# Patient Record
Sex: MALE | Race: WHITE | NOT HISPANIC OR LATINO | Employment: FULL TIME | URBAN - METROPOLITAN AREA
[De-identification: names, ages, dates, MRNs, and addresses within clinical notes are randomized per-mention and may not be internally consistent; named-entity substitution may affect disease eponyms.]

---

## 2017-01-30 ENCOUNTER — OFFICE VISIT (OUTPATIENT)
Dept: FAMILY MEDICINE | Facility: CLINIC | Age: 57
End: 2017-01-30
Payer: COMMERCIAL

## 2017-01-30 VITALS
HEART RATE: 113 BPM | DIASTOLIC BLOOD PRESSURE: 74 MMHG | TEMPERATURE: 97.5 F | OXYGEN SATURATION: 95 % | WEIGHT: 204.9 LBS | SYSTOLIC BLOOD PRESSURE: 114 MMHG | BODY MASS INDEX: 32.93 KG/M2 | HEIGHT: 66 IN

## 2017-01-30 DIAGNOSIS — J44.9 CHRONIC OBSTRUCTIVE PULMONARY DISEASE, UNSPECIFIED COPD TYPE (H): ICD-10-CM

## 2017-01-30 DIAGNOSIS — I10 BENIGN ESSENTIAL HYPERTENSION: Primary | ICD-10-CM

## 2017-01-30 PROCEDURE — 99213 OFFICE O/P EST LOW 20 MIN: CPT | Performed by: PHYSICIAN ASSISTANT

## 2017-01-30 RX ORDER — LISINOPRIL 10 MG/1
10 TABLET ORAL DAILY
Qty: 90 TABLET | Refills: 1 | Status: SHIPPED | OUTPATIENT
Start: 2017-01-30 | End: 2017-08-01

## 2017-01-30 NOTE — PROGRESS NOTES
SUBJECTIVE:                                                    Otf Bolaños is a 56 year old male who presents to clinic today for the following health issues:    Medication Followup of lisinopril     Taking Medication as prescribed: yes    Side Effects:  None    Medication Helping Symptoms:  yes     -Patient presents for blood pressure check up  -He is taking as prescribed without side effects  -No routinely checking Bps  -Denies any HA, shortness of breath, chest pain or palpitations  -Continues on daily O2, can go without during visits, grocery shopping but notes soon after this he will need to restart    -He did have a small lipo reduction surgery at [facility] in August for his Lipohypertrophy/multiple symmetrical lipomatosis and is being seen again in March    Problem list and histories reviewed & adjusted, as indicated.  Additional history: as documented    Patient Active Problem List   Diagnosis     SOB (shortness of breath)     COPD (chronic obstructive pulmonary disease) (H)     Chronic back pain     Benign essential hypertension     CARDIOVASCULAR SCREENING; LDL GOAL LESS THAN 160     Lipohypertrophy     Multiple symmetrical lipomatosis     Hx of thrombocytopenia     Past Surgical History   Procedure Laterality Date     Tracheostomy  2/7/2014     Procedure: TRACHEOSTOMY;  TRACHEOSTOMY;  Surgeon: Myles Treadwell MD;  Location:  OR       Social History   Substance Use Topics     Smoking status: Former Smoker -- 2.00 packs/day for 30 years     Quit date: 01/14/2014     Smokeless tobacco: Never Used     Alcohol Use: 4.5 oz/week     9 Cans of beer per week      Comment: 3 beers per month     Family History   Problem Relation Age of Onset     Hypertension Father      Myocardial Infarction Father      CANCER Paternal Grandfather            ROS:  Constitutional, HEENT, cardiovascular, pulmonary, gi and gu systems are negative, except as otherwise noted.    OBJECTIVE:                                              "       /74 mmHg  Pulse 113  Temp(Src) 97.5  F (36.4  C) (Oral)  Ht 5' 6.25\" (1.683 m)  Wt 204 lb 14.4 oz (92.942 kg)  BMI 32.81 kg/m2  SpO2 95%  Body mass index is 32.81 kg/(m^2).  GENERAL: healthy, alert and no distress  RESP: lungs clear to auscultation - no rales, rhonchi or wheezes  CV: regular rates and rhythm, normal S1 S2, no S3 or S4 and no murmur, click or rub    Diagnostic Test Results:  none      ASSESSMENT/PLAN:                                                    1. Benign essential hypertension  Well controlled. Refill x 6 months. At that point he will be due for an annual physical for colonoscopy, prostate, and blood work.   - lisinopril (PRINIVIL/ZESTRIL) 10 MG tablet; Take 1 tablet (10 mg) by mouth daily  Dispense: 90 tablet; Refill: 1    2. Chronic obstructive pulmonary disease, unspecified COPD type (H)  Controlled. Continues on home O2    Brian Mckinley PA-C  [facility]    "

## 2017-01-30 NOTE — NURSING NOTE
"Chief Complaint   Patient presents with     Recheck Medication       Initial /74 mmHg  Pulse 113  Temp(Src) 97.5  F (36.4  C) (Oral)  Ht 5' 6.25\" (1.683 m)  Wt 204 lb 14.4 oz (92.942 kg)  BMI 32.81 kg/m2  SpO2 95% Estimated body mass index is 32.81 kg/(m^2) as calculated from the following:    Height as of this encounter: 5' 6.25\" (1.683 m).    Weight as of this encounter: 204 lb 14.4 oz (92.942 kg).  BP completed using cuff size: larry De La Garza CMA        "

## 2017-01-30 NOTE — MR AVS SNAPSHOT
"              After Visit Summary   1/30/2017    Otf Bolaños    MRN: [id number]           Patient Information     Date Of Birth          1960        Visit Information        Provider Department      1/30/2017 9:00 AM Brian Mckinley PA-C [facility]        Today's Diagnoses     Benign essential hypertension    -  1        Follow-ups after your visit        Who to contact     If you have questions or need follow up information about today's clinic visit or your schedule please contact [facility] directly at [phone].  Normal or non-critical lab and imaging results will be communicated to you by Innozhart, letter or phone within 4 business days after the clinic has received the results. If you do not hear from us within 7 days, please contact the clinic through [employer] or phone. If you have a critical or abnormal lab result, we will notify you by phone as soon as possible.  Submit refill requests through [employer] or call your pharmacy and they will forward the refill request to us. Please allow 3 business days for your refill to be completed.          Additional Information About Your Visit        MyChart Information     [employer] gives you secure access to your electronic health record. If you see a primary care provider, you can also send messages to your care team and make appointments. If you have questions, please call your primary care clinic.  If you do not have a primary care provider, please call [phone] and they will assist you.        Care EveryWhere ID     This is your Care EveryWhere ID. This could be used by other organizations to access your [facility] medical records  [id number]        Your Vitals Were     Pulse Temperature Height BMI (Body Mass Index) Pulse Oximetry       113 97.5  F (36.4  C) (Oral) 5' 6.25\" (1.683 m) 32.81 kg/m2 95%        Blood Pressure from Last 3 Encounters:   01/30/17 114/74   07/22/16 130/74   01/07/16 118/60    Weight from " Last 3 Encounters:   01/30/17 204 lb 14.4 oz (92.942 kg)   07/22/16 199 lb 11.2 oz (90.583 kg)   12/17/15 189 lb 9.6 oz (86.002 kg)              Today, you had the following     No orders found for display         Where to get your medicines      [place] [facility] [place] [address]  [address] [facility] [address]     Phone:  [phone]    - lisinopril 10 MG tablet       Primary Care Provider Office Phone # Fax #    Brian Mckinley PA-C [phone]       [facility] [address] [address]  [facility] [address]        Thank you!     Thank you for choosing [facility]  for your care. Our goal is always to provide you with excellent care. Hearing back from our patients is one way we can continue to improve our services. Please take a few minutes to complete the written survey that you may receive in the mail after your visit with us. Thank you!             Your Updated Medication List - Protect others around you: Learn how to safely use, store and throw away your medicines at www.disposemymeds.org.          This list is accurate as of: 1/30/17  9:26 AM.  Always use your most recent med list.                   Brand Name Dispense Instructions for use    aspirin 81 MG tablet      Take 81 mg by mouth daily       lisinopril 10 MG tablet    PRINIVIL/ZESTRIL    90 tablet    Take 1 tablet (10 mg) by mouth daily       naproxen 500 MG tablet    NAPROSYN    30 tablet    Take 1 tablet (500 mg) by mouth daily       sildenafil 100 MG cap/tab    REVATIO/VIAGRA    12 tablet    Take 0.5-1 tablets ( mg) by mouth daily as needed for erectile dysfunction Take 30 min to 4 hours before intercourse.

## 2017-01-31 DIAGNOSIS — N52.9 ERECTILE DYSFUNCTION, UNSPECIFIED ERECTILE DYSFUNCTION TYPE: Primary | ICD-10-CM

## 2017-01-31 RX ORDER — SILDENAFIL 100 MG/1
50-100 TABLET, FILM COATED ORAL DAILY PRN
Qty: 12 TABLET | Refills: 11 | Status: SHIPPED | OUTPATIENT
Start: 2017-01-31 | End: 2018-02-05

## 2017-01-31 ASSESSMENT — ANXIETY QUESTIONNAIRES
IF YOU CHECKED OFF ANY PROBLEMS ON THIS QUESTIONNAIRE, HOW DIFFICULT HAVE THESE PROBLEMS MADE IT FOR YOU TO DO YOUR WORK, TAKE CARE OF THINGS AT HOME, OR GET ALONG WITH OTHER PEOPLE: NOT DIFFICULT AT ALL
7. FEELING AFRAID AS IF SOMETHING AWFUL MIGHT HAPPEN: NOT AT ALL
3. WORRYING TOO MUCH ABOUT DIFFERENT THINGS: MORE THAN HALF THE DAYS
GAD7 TOTAL SCORE: 2
2. NOT BEING ABLE TO STOP OR CONTROL WORRYING: NOT AT ALL
6. BECOMING EASILY ANNOYED OR IRRITABLE: NOT AT ALL
5. BEING SO RESTLESS THAT IT IS HARD TO SIT STILL: NOT AT ALL
1. FEELING NERVOUS, ANXIOUS, OR ON EDGE: NOT AT ALL

## 2017-01-31 ASSESSMENT — PATIENT HEALTH QUESTIONNAIRE - PHQ9: 5. POOR APPETITE OR OVEREATING: NOT AT ALL

## 2017-01-31 NOTE — TELEPHONE ENCOUNTER
Viagra      Last Written Prescription Date: 09/14/2015  Last Fill Quantity: 12,  # refills: 11   Last Office Visit with [facility], [facility] or [facility] prescribing provider: 01/30/2017                                               Thank you!  Flores De Dios  [facility]  On behalf of [facility]

## 2017-02-01 ASSESSMENT — ANXIETY QUESTIONNAIRES: GAD7 TOTAL SCORE: 2

## 2017-02-01 ASSESSMENT — PATIENT HEALTH QUESTIONNAIRE - PHQ9: SUM OF ALL RESPONSES TO PHQ QUESTIONS 1-9: 4

## 2017-02-23 ENCOUNTER — TELEPHONE (OUTPATIENT)
Dept: FAMILY MEDICINE | Facility: CLINIC | Age: 57
End: 2017-02-23

## 2017-02-23 NOTE — LETTER
February 23, 2017      Otf Bolaños  [address] [place] [address]        Dear Mr. Otf Bolaños,    Our records show that you are currently due for colon cancer screening either with a colonoscopy or a stool test (FIT Test).   Please call our clinic at [phone] to have these orders placed and we can assist you in scheduling this appointment.    If you have already had this completed please contact our clinic so we can update your chart.     If you have further questions or concerns please feel free to contact us via [employer] or by calling our clinic at [phone].    Sincerely,     Brian Mckinley PA-C

## 2017-02-23 NOTE — TELEPHONE ENCOUNTER
Panel Management Review      Patient has the following on his problem list: None      Composite cancer screening  Chart review shows that this patient is due/due soon for the following Colonoscopy  Summary:    Patient is due/failing the following:   COLONOSCOPY    Action needed:   Patient needs office visit for Colonoscopy.    Type of outreach:    Sent [employer] message.    Questions for provider review:    None                                                                                                                                    Aaron De La Garza [facility]       Chart routed to Care Team .

## 2017-03-30 ENCOUNTER — TELEPHONE (OUTPATIENT)
Dept: FAMILY MEDICINE | Facility: CLINIC | Age: 57
End: 2017-03-30

## 2017-03-30 NOTE — TELEPHONE ENCOUNTER
Panel Management Review      Patient has the following on his problem list: None      Composite cancer screening  Chart review shows that this patient is due/due soon for the following Colonoscopy  Summary:    Patient is due/failing the following:   COLONOSCOPY    Action needed:   Patient needs office visit for Colonoscopy.    Type of outreach:    Sent [employer] message.    Questions for provider review:    None                                                                                                                                    Aaron De La Garza [facility]       Chart routed to Care Team .

## 2017-06-19 ENCOUNTER — MEDICAL CORRESPONDENCE (OUTPATIENT)
Dept: HEALTH INFORMATION MANAGEMENT | Facility: CLINIC | Age: 57
End: 2017-06-19

## 2017-08-01 ENCOUNTER — OFFICE VISIT (OUTPATIENT)
Dept: FAMILY MEDICINE | Facility: CLINIC | Age: 57
End: 2017-08-01
Payer: COMMERCIAL

## 2017-08-01 VITALS
DIASTOLIC BLOOD PRESSURE: 72 MMHG | HEIGHT: 66 IN | TEMPERATURE: 97.6 F | OXYGEN SATURATION: 94 % | WEIGHT: 208.7 LBS | BODY MASS INDEX: 33.54 KG/M2 | SYSTOLIC BLOOD PRESSURE: 114 MMHG | HEART RATE: 104 BPM

## 2017-08-01 DIAGNOSIS — Z13.6 CARDIOVASCULAR SCREENING; LDL GOAL LESS THAN 160: ICD-10-CM

## 2017-08-01 DIAGNOSIS — L29.9 ITCHY SCALP: ICD-10-CM

## 2017-08-01 DIAGNOSIS — E88.2: ICD-10-CM

## 2017-08-01 DIAGNOSIS — J44.9 CHRONIC OBSTRUCTIVE PULMONARY DISEASE, UNSPECIFIED COPD TYPE (H): ICD-10-CM

## 2017-08-01 DIAGNOSIS — I10 BENIGN ESSENTIAL HYPERTENSION: Primary | ICD-10-CM

## 2017-08-01 DIAGNOSIS — Z11.59 NEED FOR HEPATITIS C SCREENING TEST: ICD-10-CM

## 2017-08-01 PROCEDURE — 86803 HEPATITIS C AB TEST: CPT | Performed by: PHYSICIAN ASSISTANT

## 2017-08-01 PROCEDURE — 80061 LIPID PANEL: CPT | Performed by: PHYSICIAN ASSISTANT

## 2017-08-01 PROCEDURE — 36415 COLL VENOUS BLD VENIPUNCTURE: CPT | Performed by: PHYSICIAN ASSISTANT

## 2017-08-01 PROCEDURE — 99214 OFFICE O/P EST MOD 30 MIN: CPT | Performed by: PHYSICIAN ASSISTANT

## 2017-08-01 PROCEDURE — 80048 BASIC METABOLIC PNL TOTAL CA: CPT | Performed by: PHYSICIAN ASSISTANT

## 2017-08-01 RX ORDER — KETOCONAZOLE 20 MG/ML
SHAMPOO TOPICAL DAILY PRN
Qty: 100 ML | Refills: 0 | Status: SHIPPED | OUTPATIENT
Start: 2017-08-01 | End: 2018-02-05

## 2017-08-01 RX ORDER — LISINOPRIL 10 MG/1
10 TABLET ORAL DAILY
Qty: 90 TABLET | Refills: 1 | Status: SHIPPED | OUTPATIENT
Start: 2017-08-01 | End: 2018-02-05

## 2017-08-01 NOTE — NURSING NOTE
"Chief Complaint   Patient presents with     Recheck Medication     Itchy head       Initial /72 (BP Location: Right arm, Cuff Size: Adult Regular)  Pulse 104  Temp 97.6  F (36.4  C) (Oral)  Ht 5' 6.25\" (1.683 m)  Wt 208 lb 11.2 oz (94.7 kg)  SpO2 94%  BMI 33.43 kg/m2 Estimated body mass index is 33.43 kg/(m^2) as calculated from the following:    Height as of this encounter: 5' 6.25\" (1.683 m).    Weight as of this encounter: 208 lb 11.2 oz (94.7 kg).  Medication Reconciliation: complete   Aaron De La Garza CMA        "

## 2017-08-01 NOTE — MR AVS SNAPSHOT
After Visit Summary   8/1/2017    Otf Bolaños    MRN: [id number]           Patient Information     Date Of Birth          1960        Visit Information        Provider Department      8/1/2017 9:00 AM Brian Mckinley PA-C [facility]        Today's Diagnoses     Benign essential hypertension    -  1    Chronic obstructive pulmonary disease, unspecified COPD type (H)        Multiple symmetrical lipomatosis        Itchy scalp        CARDIOVASCULAR SCREENING; LDL GOAL LESS THAN 160        Need for hepatitis C screening test           Follow-ups after your visit        Who to contact     If you have questions or need follow up information about today's clinic visit or your schedule please contact [facility] directly at [phone].  Normal or non-critical lab and imaging results will be communicated to you by [employer]iCurehart, letter or phone within 4 business days after the clinic has received the results. If you do not hear from us within 7 days, please contact the clinic through [employer] or phone. If you have a critical or abnormal lab result, we will notify you by phone as soon as possible.  Submit refill requests through [employer] or call your pharmacy and they will forward the refill request to us. Please allow 3 business days for your refill to be completed.          Additional Information About Your Visit        MyChart Information     [employer] gives you secure access to your electronic health record. If you see a primary care provider, you can also send messages to your care team and make appointments. If you have questions, please call your primary care clinic.  If you do not have a primary care provider, please call [phone] and they will assist you.        Care EveryWhere ID     This is your Care EveryWhere ID. This could be used by other organizations to access your [facility] medical records  [id number]        Your Vitals Were     Pulse Temperature  "Height Pulse Oximetry BMI (Body Mass Index)       104 97.6  F (36.4  C) (Oral) 5' 6.25\" (1.683 m) 94% 33.43 kg/m2        Blood Pressure from Last 3 Encounters:   08/01/17 114/72   01/30/17 114/74   07/22/16 130/74    Weight from Last 3 Encounters:   08/01/17 208 lb 11.2 oz (94.7 kg)   01/30/17 204 lb 14.4 oz (92.9 kg)   07/22/16 199 lb 11.2 oz (90.6 kg)              We Performed the Following     Basic metabolic panel     Hepatitis C Screen Reflex to HCV RNA Quant and Genotype     Lipid panel reflex to direct LDL          Today's Medication Changes          These changes are accurate as of: 8/1/17  9:28 AM.  If you have any questions, ask your nurse or doctor.               Start taking these medicines.        Dose/Directions    fluticasone-vilanterol 200-25 MCG/INH oral inhaler   Commonly known as:  BREO ELLIPTA   Used for:  Chronic obstructive pulmonary disease, unspecified COPD type (H)   Started by:  Brian Mckinley PA-C        Dose:  1 puff   Inhale 1 puff into the lungs daily   Quantity:  1 Inhaler   Refills:  5       ketoconazole 2 % shampoo   Commonly known as:  NIZORAL   Used for:  Itchy scalp   Started by:  Brian Mckinley PA-C        Apply topically daily as needed for itching or irritation   Quantity:  100 mL   Refills:  0            Where to get your medicines      These medications were sent to [facility]  [place] - [address]  [address] [address]     Phone:  [phone]     fluticasone-vilanterol 200-25 MCG/INH oral inhaler    ketoconazole 2 % shampoo    lisinopril 10 MG tablet                Primary Care Provider Office Phone # Fax #    Brian Mckinley PA-C [phone]       [facility] [place] [address] [place] [address]        Equal Access to Services     FIDEL PRIDE AH: Hadii aad ku hadasho Soomaali, waaxda luqadaha, qaybta kaalmada adeegyada, waxay valariein hayaan adeeg kharash la'aan ah. So [facility] " [phone].    ATENCIÓN: Si anderson michele, tiene a guillen disposición servicios gratuitos de asistencia lingüística. Francoise lopez [phone].    We comply with applicable federal civil rights laws and [place] laws. We do not discriminate on the basis of race, color, national origin, age, disability sex, sexual orientation or gender identity.            Thank you!     Thank you for choosing [facility] [place]  for your care. Our goal is always to provide you with excellent care. Hearing back from our patients is one way we can continue to improve our services. Please take a few minutes to complete the written survey that you may receive in the mail after your visit with us. Thank you!             Your Updated Medication List - Protect others around you: Learn how to safely use, store and throw away your medicines at www.disposemymeds.org.          This list is accurate as of: 8/1/17  9:28 AM.  Always use your most recent med list.                   Brand Name Dispense Instructions for use Diagnosis    aspirin 81 MG tablet      Take 81 mg by mouth daily        fluticasone-vilanterol 200-25 MCG/INH oral inhaler    BREO ELLIPTA    1 Inhaler    Inhale 1 puff into the lungs daily    Chronic obstructive pulmonary disease, unspecified COPD type (H)       ketoconazole 2 % shampoo    NIZORAL    100 mL    Apply topically daily as needed for itching or irritation    Itchy scalp       lisinopril 10 MG tablet    PRINIVIL/ZESTRIL    90 tablet    Take 1 tablet (10 mg) by mouth daily    Benign essential hypertension       sildenafil 100 MG cap/tab    REVATIO/VIAGRA    12 tablet    Take 0.5-1 tablets ( mg) by mouth daily as needed for erectile dysfunction Take 30 min to 4 hours before intercourse.    Erectile dysfunction, unspecified erectile dysfunction type
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
## 2017-08-02 LAB
ANION GAP SERPL CALCULATED.3IONS-SCNC: 4 MMOL/L (ref 3–14)
BUN SERPL-MCNC: 15 MG/DL (ref 7–30)
CALCIUM SERPL-MCNC: 9.1 MG/DL (ref 8.5–10.1)
CHLORIDE SERPL-SCNC: 106 MMOL/L (ref 94–109)
CHOLEST SERPL-MCNC: 201 MG/DL
CO2 SERPL-SCNC: 28 MMOL/L (ref 20–32)
CREAT SERPL-MCNC: 0.91 MG/DL (ref 0.66–1.25)
GFR SERPL CREATININE-BSD FRML MDRD: 86 ML/MIN/1.7M2
GLUCOSE SERPL-MCNC: 114 MG/DL (ref 70–99)
HCV AB SERPL QL IA: NORMAL
HDLC SERPL-MCNC: 53 MG/DL
LDLC SERPL CALC-MCNC: 129 MG/DL
NONHDLC SERPL-MCNC: 148 MG/DL
POTASSIUM SERPL-SCNC: 4.8 MMOL/L (ref 3.4–5.3)
SODIUM SERPL-SCNC: 138 MMOL/L (ref 133–144)
TRIGL SERPL-MCNC: 94 MG/DL

## 2017-09-25 ENCOUNTER — DOCUMENTATION ONLY (OUTPATIENT)
Dept: FAMILY MEDICINE | Facility: CLINIC | Age: 57
End: 2017-09-25

## 2017-09-25 NOTE — PROGRESS NOTES
Panel Management Review      Patient has the following on his problem list: None      Composite cancer screening  Chart review shows that this patient is due/due soon for the following Colonoscopy  Summary:    Patient is due/failing the following:   COLONOSCOPY    Action needed:   Patient needs office visit for colon  .    Type of outreach:    Sent [employer] message.    Questions for provider review:    None                                                                                                                                    Aaron De La Garza [facility]       Chart routed to Care Team .

## 2018-02-05 ENCOUNTER — OFFICE VISIT (OUTPATIENT)
Dept: FAMILY MEDICINE | Facility: CLINIC | Age: 58
End: 2018-02-05
Payer: COMMERCIAL

## 2018-02-05 VITALS
OXYGEN SATURATION: 94 % | TEMPERATURE: 98.2 F | WEIGHT: 207.5 LBS | BODY MASS INDEX: 33.35 KG/M2 | HEIGHT: 66 IN | HEART RATE: 105 BPM | DIASTOLIC BLOOD PRESSURE: 77 MMHG | RESPIRATION RATE: 16 BRPM | SYSTOLIC BLOOD PRESSURE: 122 MMHG

## 2018-02-05 DIAGNOSIS — Z00.01 ENCOUNTER FOR ROUTINE ADULT HEALTH EXAMINATION WITH ABNORMAL FINDINGS: Primary | ICD-10-CM

## 2018-02-05 DIAGNOSIS — I27.20 PULMONARY HYPERTENSION (H): ICD-10-CM

## 2018-02-05 DIAGNOSIS — R73.03 PRE-DIABETES: ICD-10-CM

## 2018-02-05 DIAGNOSIS — N52.9 ERECTILE DYSFUNCTION, UNSPECIFIED ERECTILE DYSFUNCTION TYPE: ICD-10-CM

## 2018-02-05 DIAGNOSIS — Z12.5 SCREENING FOR PROSTATE CANCER: ICD-10-CM

## 2018-02-05 DIAGNOSIS — E88.2: ICD-10-CM

## 2018-02-05 DIAGNOSIS — I10 BENIGN ESSENTIAL HYPERTENSION: ICD-10-CM

## 2018-02-05 DIAGNOSIS — J44.9 CHRONIC OBSTRUCTIVE PULMONARY DISEASE, UNSPECIFIED COPD TYPE (H): ICD-10-CM

## 2018-02-05 DIAGNOSIS — E78.5 HYPERLIPIDEMIA LDL GOAL <100: ICD-10-CM

## 2018-02-05 PROCEDURE — 99396 PREV VISIT EST AGE 40-64: CPT | Performed by: PHYSICIAN ASSISTANT

## 2018-02-05 PROCEDURE — 80048 BASIC METABOLIC PNL TOTAL CA: CPT | Performed by: PHYSICIAN ASSISTANT

## 2018-02-05 PROCEDURE — 36415 COLL VENOUS BLD VENIPUNCTURE: CPT | Performed by: PHYSICIAN ASSISTANT

## 2018-02-05 PROCEDURE — 80061 LIPID PANEL: CPT | Performed by: PHYSICIAN ASSISTANT

## 2018-02-05 PROCEDURE — G0103 PSA SCREENING: HCPCS | Performed by: PHYSICIAN ASSISTANT

## 2018-02-05 RX ORDER — LISINOPRIL 10 MG/1
10 TABLET ORAL DAILY
Qty: 90 TABLET | Refills: 1 | Status: SHIPPED | OUTPATIENT
Start: 2018-02-05 | End: 2018-07-30

## 2018-02-05 ASSESSMENT — PATIENT HEALTH QUESTIONNAIRE - PHQ9
SUM OF ALL RESPONSES TO PHQ QUESTIONS 1-9: 9
5. POOR APPETITE OR OVEREATING: NOT AT ALL
SUM OF ALL RESPONSES TO PHQ QUESTIONS 1-9: 9
10. IF YOU CHECKED OFF ANY PROBLEMS, HOW DIFFICULT HAVE THESE PROBLEMS MADE IT FOR YOU TO DO YOUR WORK, TAKE CARE OF THINGS AT HOME, OR GET ALONG WITH OTHER PEOPLE: NOT DIFFICULT AT ALL

## 2018-02-05 ASSESSMENT — ANXIETY QUESTIONNAIRES
7. FEELING AFRAID AS IF SOMETHING AWFUL MIGHT HAPPEN: NOT AT ALL
IF YOU CHECKED OFF ANY PROBLEMS ON THIS QUESTIONNAIRE, HOW DIFFICULT HAVE THESE PROBLEMS MADE IT FOR YOU TO DO YOUR WORK, TAKE CARE OF THINGS AT HOME, OR GET ALONG WITH OTHER PEOPLE: NOT DIFFICULT AT ALL
1. FEELING NERVOUS, ANXIOUS, OR ON EDGE: SEVERAL DAYS
6. BECOMING EASILY ANNOYED OR IRRITABLE: NOT AT ALL
3. WORRYING TOO MUCH ABOUT DIFFERENT THINGS: SEVERAL DAYS
5. BEING SO RESTLESS THAT IT IS HARD TO SIT STILL: NOT AT ALL
GAD7 TOTAL SCORE: 3
2. NOT BEING ABLE TO STOP OR CONTROL WORRYING: SEVERAL DAYS

## 2018-02-05 NOTE — PROGRESS NOTES
SUBJECTIVE:   CC: Otf Bolaños is an 57 year old male who presents for preventative health visit.     Physical   Annual:     Getting at least 3 servings of Calcium per day::  NO    Bi-annual eye exam::  NO    Dental care twice a year::  NO    Sleep apnea or symptoms of sleep apnea::  None    Diet::  Breakfast skipped    Frequency of exercise::  4-5 days/week    Duration of exercise::  Less than 15 minutes    Taking medications regularly::  Yes    Medication side effects::  None    Additional concerns today::  No          -Patient presents for annual physical  -Since last seen he has had some surgery to remove some of his excess tissue along the neck   -he did have a stint in the ICU during this time   -told he may have pulmonary hypertension   -hx of COPD, uses oxygen at times, not routinely any longer   -continues O2 overnight   -did not  inhaler to start since when last seen   -I have not seen these [place] results   -would  Like to see pulmonology before considering other tx, Dr. Richard        Today's PHQ-2 Score:   PHQ-2 ( 1999 [employer]) 2/5/2018   Q1: Little interest or pleasure in doing things 2   Q2: Feeling down, depressed or hopeless 2   PHQ-2 Score 4   Q1: Little interest or pleasure in doing things More than half the days   Q2: Feeling down, depressed or hopeless More than half the days   PHQ-2 Score 4       Abuse: Current or Past(Physical, Sexual or Emotional)- No  Do you feel safe in your environment - Yes    Social History   Substance Use Topics     Smoking status: Former Smoker     Packs/day: 2.00     Years: 30.00     Quit date: 1/14/2014     Smokeless tobacco: Never Used     Alcohol use 4.5 oz/week     9 Cans of beer per week      Comment: 3 beers per month     Alcohol Use 2/5/2018   If you drink alcohol, do you typically have greater than 3 drinks per day OR greater than 7 drinks per week?   No       Last PSA: No results found for: PSA    Reviewed orders with patient. Reviewed health  maintenance and updated orders accordingly - Yes  Labs reviewed in EPIC    Reviewed and updated as needed this visit by clinical staff         Reviewed and updated as needed this visit by Provider            Review of Systems  C: NEGATIVE for fever, chills, change in weight  I: NEGATIVE for worrisome rashes, moles or lesions  E: NEGATIVE for vision changes or irritation  ENT: NEGATIVE for ear, mouth and throat problems  R: NEGATIVE for significant cough or SOB  CV: NEGATIVE for chest pain, palpitations or peripheral edema  GI: NEGATIVE for nausea, abdominal pain, heartburn, or change in bowel habits   male: negative for dysuria, hematuria, decreased urinary stream, erectile dysfunction, urethral discharge  M: NEGATIVE for significant arthralgias or myalgia  N: NEGATIVE for weakness, dizziness or paresthesias  P: NEGATIVE for changes in mood or affect    OBJECTIVE:   There were no vitals taken for this visit.    Physical Exam  GENERAL: healthy, alert and no distress  EYES: Eyes grossly normal to inspection, PERRL and conjunctivae and sclerae normal  HENT: ear canals and TM's normal, nose and mouth without ulcers or lesions  HENT: normal cephalic/atraumatic with increased soft, fatty tissue along right occipital, ear canals and TM's normal, nose and mouth without ulcers or lesions, oropharynx clear and oral mucous membranes moist  NECK: longituinal anterior scar from tissue removal  RESP: lungs clear to auscultation, quiet, diminished expiratory phase   CV: regular rates and rhythm, normal S1 S2, no S3 or S4 and no murmur, click or rub  ABDOMEN: soft, nontender, no hepatosplenomegaly, no masses and bowel sounds normal   (male): normal male genitalia without lesions or urethral discharge, no hernia  MS: no gross musculoskeletal defects noted, no edema  NEURO: Normal strength and tone, mentation intact and speech normal    ASSESSMENT/PLAN:   1. Encounter for routine adult health examination with abnormal  "findings  Up to date on immunizations. Declines colonoscopy. Updating PSA    2. Benign essential hypertension  Controlled. Refilling  - Basic metabolic panel  (Ca, Cl, CO2, Creat, Gluc, K, Na, BUN)  - lisinopril (PRINIVIL/ZESTRIL) 10 MG tablet; Take 1 tablet (10 mg) by mouth daily  Dispense: 90 tablet; Refill: 1    3. Chronic obstructive pulmonary disease, unspecified COPD type (H)  Continues on evening O2, did not  inhaler for use. New dx of pulmonary htn, though I have not seen records. His family would like him to discuss work up with pulm. Certainly he would benefit from daily inhalation tx and rehab?  - PULMONARY MEDICINE REFERRAL    4. Hyperlipidemia LDL goal <100  Rechecking. He needs work on diet and exercise. Will need to consider statin  - Lipid panel reflex to direct LDL Fasting    5. Multiple symmetrical lipomatosis  Recent surgery for lipomatous removal. Follow up later this spring    6. Pulmonary hypertension  As above. I have not seen the [facility] records recording this but he will be sending them  - PULMONARY MEDICINE REFERRAL    7. Pre-diabetes  Rechecking. Reviewed diet and exercise  - Basic metabolic panel  (Ca, Cl, CO2, Creat, Gluc, K, Na, BUN)    8. Screening for prostate cancer  - Prostate spec antigen screen    COUNSELING:   Reviewed preventive health counseling, as reflected in patient instructions       Regular exercise       Healthy diet/nutrition       Colon cancer screening       Prostate cancer screening         reports that he quit smoking about 4 years ago. He has a 60.00 pack-year smoking history. He has never used smokeless tobacco.    Estimated body mass index is 33.43 kg/(m^2) as calculated from the following:    Height as of 8/1/17: 5' 6.25\" (1.683 m).    Weight as of 8/1/17: 208 lb 11.2 oz (94.7 kg).   Weight management plan: Discussed healthy diet and exercise guidelines and patient will follow up in 12 months in clinic to re-evaluate.    Counseling Resources:  ATP IV " Guidelines  Pooled Cohorts Equation Calculator  FRAX Risk Assessment  ICSI Preventive Guidelines  Dietary Guidelines for Americans, 2010  USDA's MyPlate  ASA Prophylaxis  Lung CA Screening    Brian Mckilney PA-C  [facility]  Answers for HPI/ROS submitted by the patient on 2/5/2018   PHQ-2 Score: 4  If you checked off any problems, how difficult have these problems made it for you to do your work, take care of things at home, or get along with other people?: Not difficult at all  PHQ9 TOTAL SCORE: 9

## 2018-02-05 NOTE — LETTER
February 9, 2018      Otf Bolaños  [address] [place] [address]        Betzy Carter seeing you last week as always.    Copies of the labs from your visit included. Overall they look very good.    The screening of your prostate was well within the levels we like to see.    Your cholesterol panel got much better than 6 months ago! I cannot recall if you'd made any major dietary changes but whatever you're doing keep it up!    Finally, screening of the kidneys, electrolytes and for diabetes looked good as well.     Please let me know if you have any questions about any of these,    Marquis Mckinley

## 2018-02-05 NOTE — TELEPHONE ENCOUNTER
"Requested Prescriptions   Pending Prescriptions Disp Refills     VIAGRA 100 MG tablet [Pharmacy Med Name: VIAGRA 100MG TABS]  Last Written Prescription Date:  1/31/17  Last Fill Quantity: 12,  # refills: 11   Last Office Visit with [facility] provider:  2/5/2018     Future Office Visit:      12 tablet 11     Sig: TAKE ONE-HALF TO ONE TABLET BY MOUTH ONCE DAILY AS NEEDED FOR ERECTILE DYSFUNCTION. TAKE 30 MINUTES TO 4 HOURS BEFORE INTERCOURSE.    Erectile Dysfuction Protocol Failed    2/5/2018  9:54 AM       Failed - Absence of nitrates on medication list       Passed - Absence of Alpha Blockers on Med list       Passed - Recent or future visit with authorizing provider    Patient had office visit in the last year or has a visit in the next 30 days with authorizing provider.  See \"Patient Info\" tab in inbasket, or \"Choose Columns\" in Meds & Orders section of the refill encounter.            Passed - Patient is age 18 or older          "

## 2018-02-05 NOTE — MR AVS SNAPSHOT
After Visit Summary   2/5/2018    Otf Bolaños    MRN: [id number]           Patient Information     Date Of Birth          1960        Visit Information        Provider Department      2/5/2018 9:00 AM Brian Mckinley PA-C [facility]        Today's Diagnoses     Encounter for routine adult health examination with abnormal findings    -  1    Benign essential hypertension        Chronic obstructive pulmonary disease, unspecified COPD type (H)        Hyperlipidemia LDL goal <100        Multiple symmetrical lipomatosis        Pulmonary hypertension        Pre-diabetes        Screening for prostate cancer          Care Instructions      Preventive Health Recommendations  Male Ages 50 - 64    Yearly exam:             See your health care provider every year in order to  o   Review health changes.   o   Discuss preventive care.    o   Review your medicines if your doctor has prescribed any.     Have a cholesterol test every 5 years, or more frequently if you are at risk for high cholesterol/heart disease.     Have a diabetes test (fasting glucose) every three years. If you are at risk for diabetes, you should have this test more often.     Have a colonoscopy at age 50, or have a yearly FIT test (stool test). These exams will check for colon cancer.      Talk with your health care provider about whether or not a prostate cancer screening test (PSA) is right for you.    You should be tested each year for STDs (sexually transmitted diseases), if you re at risk.     Shots: Get a flu shot each year. Get a tetanus shot every 10 years.     Nutrition:    Eat at least 5 servings of fruits and vegetables daily.     Eat whole-grain bread, whole-wheat pasta and brown rice instead of white grains and rice.     Talk to your provider about Calcium and Vitamin D.     Lifestyle    Exercise for at least 150 minutes a week (30 minutes a day, 5 days a week). This will help you control your weight  and prevent disease.     Limit alcohol to one drink per day.     No smoking.     Wear sunscreen to prevent skin cancer.     See your dentist every six months for an exam and cleaning.     See your eye doctor every 1 to 2 years.            Follow-ups after your visit        Additional Services     PULMONARY MEDICINE REFERRAL       Your provider has referred you to: [facility]: [facility] [place] [phone]   [email or web address]  [facility]: [facility] [place] [phone]   [email or web address]    Please be aware that coverage of these services is subject to the terms and limitations of your health insurance plan.  Call member services at your health plan with any benefit or coverage questions.      Please bring the following with you to your appointment:    (1) Any X-Rays, CTs or MRIs which have been performed.  Contact the facility where they were done to arrange for  prior to your scheduled appointment.    (2) List of current medications   (3) This referral request   (4) Any documents/labs given to you for this referral                  Follow-up notes from your care team     Return in about 6 months (around 8/5/2018) for or per your lab results.      Who to contact     If you have questions or need follow up information about today's clinic visit or your schedule please contact [facility] directly at [phone].  Normal or non-critical lab and imaging results will be communicated to you by MyChart, letter or phone within 4 business days after the clinic has received the results. If you do not hear from us within 7 days, please contact the clinic through MyChart or phone. If you have a critical or abnormal lab result, we will notify you by phone as soon as possible.  Submit refill requests through [employer] or call your pharmacy and they will  "forward the refill request to us. Please allow 3 business days for your refill to be completed.          Additional Information About Your Visit        [employer] Information     [employer] gives you secure access to your electronic health record. If you see a primary care provider, you can also send messages to your care team and make appointments. If you have questions, please call your primary care clinic.  If you do not have a primary care provider, please call [phone] and they will assist you.        Care EveryWhere ID     This is your Care EveryWhere ID. This could be used by other organizations to access your [place] medical records  [id number]        Your Vitals Were     Pulse Temperature Respirations Height Pulse Oximetry BMI (Body Mass Index)    105 98.2  F (36.8  C) (Tympanic) 16 5' 6\" (1.676 m) 94% 33.49 kg/m2       Blood Pressure from Last 3 Encounters:   02/05/18 122/77   08/01/17 114/72   01/30/17 114/74    Weight from Last 3 Encounters:   02/05/18 207 lb 8 oz (94.1 kg)   08/01/17 208 lb 11.2 oz (94.7 kg)   01/30/17 204 lb 14.4 oz (92.9 kg)              We Performed the Following     Basic metabolic panel  (Ca, Cl, CO2, Creat, Gluc, K, Na, BUN)     Lipid panel reflex to direct LDL Fasting     Prostate spec antigen screen     PULMONARY MEDICINE REFERRAL          Today's Medication Changes          These changes are accurate as of 2/5/18  9:40 AM.  If you have any questions, ask your nurse or doctor.               Stop taking these medicines if you haven't already. Please contact your care team if you have questions.     fluticasone-vilanterol 200-25 MCG/INH oral inhaler   Commonly known as:  BREO ELLIPTA   Stopped by:  Brian Mckinley PA-C                Where to get your medicines      These medications were sent to [facility] - [id number]  [address] [address]     Phone:  [phone]     lisinopril 10 MG tablet                " Primary Care Provider Office Phone # Fax #    Brian Mckinley PA-C [phone] [address] [facility] [address]        Equal Access to Services     FIDEL PRIDE : Hadii aad ku hadcandio Soomaali, waaxda luqadaha, qaybta kaalmada adeegyada, gloria kyeln yuko vick ladestinimark yaneli. So [facility] [phone].    ATENCIÓN: Si habla español, tiene a guillen disposición servicios gratuitos de asistencia lingüística. Llame al [phone].    We comply with applicable federal civil rights laws and [place] laws. We do not discriminate on the basis of race, color, national origin, age, disability, sex, sexual orientation, or gender identity.            Thank you!     Thank you for choosing [facility]  for your care. Our goal is always to provide you with excellent care. Hearing back from our patients is one way we can continue to improve our services. Please take a few minutes to complete the written survey that you may receive in the mail after your visit with us. Thank you!             Your Updated Medication List - Protect others around you: Learn how to safely use, store and throw away your medicines at www.disposemymeds.org.          This list is accurate as of 2/5/18  9:40 AM.  Always use your most recent med list.                   Brand Name Dispense Instructions for use Diagnosis    aspirin 81 MG tablet      Take 81 mg by mouth daily        lisinopril 10 MG tablet    PRINIVIL/ZESTRIL    90 tablet    Take 1 tablet (10 mg) by mouth daily    Benign essential hypertension       sildenafil 100 MG tablet    VIAGRA    12 tablet    Take 0.5-1 tablets ( mg) by mouth daily as needed for erectile dysfunction Take 30 min to 4 hours before intercourse.    Erectile dysfunction, unspecified erectile dysfunction type

## 2018-02-05 NOTE — NURSING NOTE
"Chief Complaint   Patient presents with     Physical       Initial /77 (BP Location: Right arm, Patient Position: Chair, Cuff Size: Adult Large)  Pulse 105  Temp 98.2  F (36.8  C) (Tympanic)  Resp 16  Ht 5' 6\" (1.676 m)  Wt 207 lb 8 oz (94.1 kg)  SpO2 94%  BMI 33.49 kg/m2 Estimated body mass index is 33.49 kg/(m^2) as calculated from the following:    Height as of this encounter: 5' 6\" (1.676 m).    Weight as of this encounter: 207 lb 8 oz (94.1 kg).  Medication Reconciliation: complete   Adelina Hooker MA       "

## 2018-02-06 LAB
ANION GAP SERPL CALCULATED.3IONS-SCNC: 6 MMOL/L (ref 3–14)
BUN SERPL-MCNC: 19 MG/DL (ref 7–30)
CALCIUM SERPL-MCNC: 8.9 MG/DL (ref 8.5–10.1)
CHLORIDE SERPL-SCNC: 107 MMOL/L (ref 94–109)
CHOLEST SERPL-MCNC: 168 MG/DL
CO2 SERPL-SCNC: 29 MMOL/L (ref 20–32)
CREAT SERPL-MCNC: 0.91 MG/DL (ref 0.66–1.25)
GFR SERPL CREATININE-BSD FRML MDRD: 85 ML/MIN/1.7M2
GLUCOSE SERPL-MCNC: 95 MG/DL (ref 70–99)
HDLC SERPL-MCNC: 48 MG/DL
LDLC SERPL CALC-MCNC: 98 MG/DL
NONHDLC SERPL-MCNC: 120 MG/DL
POTASSIUM SERPL-SCNC: 4.4 MMOL/L (ref 3.4–5.3)
PSA SERPL-ACNC: 0.45 UG/L (ref 0–4)
SODIUM SERPL-SCNC: 142 MMOL/L (ref 133–144)
TRIGL SERPL-MCNC: 110 MG/DL

## 2018-02-06 RX ORDER — SILDENAFIL CITRATE 100 MG
TABLET ORAL
Qty: 12 TABLET | Refills: 11 | Status: SHIPPED | OUTPATIENT
Start: 2018-02-06 | End: 2018-07-30

## 2018-02-06 ASSESSMENT — ANXIETY QUESTIONNAIRES: GAD7 TOTAL SCORE: 3

## 2018-02-06 NOTE — TELEPHONE ENCOUNTER
Prescription approved per [facility] Refill Protocol.  Milagro Padilla, RN  Triage Nurse

## 2018-07-30 ENCOUNTER — OFFICE VISIT (OUTPATIENT)
Dept: FAMILY MEDICINE | Facility: CLINIC | Age: 58
End: 2018-07-30
Payer: COMMERCIAL

## 2018-07-30 VITALS
BODY MASS INDEX: 33.68 KG/M2 | WEIGHT: 209.6 LBS | HEIGHT: 66 IN | RESPIRATION RATE: 16 BRPM | DIASTOLIC BLOOD PRESSURE: 64 MMHG | OXYGEN SATURATION: 96 % | TEMPERATURE: 98.4 F | SYSTOLIC BLOOD PRESSURE: 102 MMHG | HEART RATE: 91 BPM

## 2018-07-30 DIAGNOSIS — I10 BENIGN ESSENTIAL HYPERTENSION: Primary | ICD-10-CM

## 2018-07-30 DIAGNOSIS — J44.9 CHRONIC OBSTRUCTIVE PULMONARY DISEASE, UNSPECIFIED COPD TYPE (H): ICD-10-CM

## 2018-07-30 DIAGNOSIS — I27.20 PULMONARY HYPERTENSION (H): ICD-10-CM

## 2018-07-30 DIAGNOSIS — N52.9 ERECTILE DYSFUNCTION, UNSPECIFIED ERECTILE DYSFUNCTION TYPE: ICD-10-CM

## 2018-07-30 PROCEDURE — 99214 OFFICE O/P EST MOD 30 MIN: CPT | Performed by: PHYSICIAN ASSISTANT

## 2018-07-30 RX ORDER — LISINOPRIL 10 MG/1
10 TABLET ORAL DAILY
Qty: 90 TABLET | Refills: 1 | Status: SHIPPED | OUTPATIENT
Start: 2018-07-30 | End: 2019-01-30

## 2018-07-30 RX ORDER — SILDENAFIL CITRATE 20 MG/1
TABLET ORAL
Qty: 30 TABLET | Refills: 0 | Status: SHIPPED | OUTPATIENT
Start: 2018-07-30

## 2018-07-30 NOTE — MR AVS SNAPSHOT
After Visit Summary   7/30/2018    Otf Bolaños    MRN: [id number]           Patient Information     Date Of Birth          1960        Visit Information        Provider Department      7/30/2018 8:40 AM Brian Mckinley PA-C [facility]        Today's Diagnoses     Benign essential hypertension    -  1    Chronic obstructive pulmonary disease, unspecified COPD type (H)        Pulmonary hypertension        Erectile dysfunction, unspecified erectile dysfunction type          Care Instructions    [facility]: [place] for [facility] [phone]   [email or web address]      [facility]: [facility] [place] [phone]   [email or web address]          Follow-ups after your visit        Follow-up notes from your care team     Return in about 6 months (around 1/30/2019) for Physical Exam.      Who to contact     If you have questions or need follow up information about today's clinic visit or your schedule please contact [facility] directly at [phone].  Normal or non-critical lab and imaging results will be communicated to you by [employer], letter or phone within 4 business days after the clinic has received the results. If you do not hear from us within 7 days, please contact the clinic through [employer] or phone. If you have a critical or abnormal lab result, we will notify you by phone as soon as possible.  Submit refill requests through [employer] or call your pharmacy and they will forward the refill request to us. Please allow 3 business days for your refill to be completed.          Additional Information About Your Visit        MyChart Information     [employer] gives you secure access to your electronic health record. If you see a primary care provider, you can also send messages to your care team and make appointments.  "If you have questions, please call your primary care clinic.  If you do not have a primary care provider, please call [phone] and they will assist you.        Care EveryWhere ID     This is your Care EveryWhere ID. This could be used by other organizations to access your [place] medical records  [id number]        Your Vitals Were     Pulse Temperature Respirations Height Pulse Oximetry BMI (Body Mass Index)    91 98.4  F (36.9  C) (Tympanic) 16 5' 6\" (1.676 m) 96% 33.83 kg/m2       Blood Pressure from Last 3 Encounters:   07/30/18 102/64   02/05/18 122/77   08/01/17 114/72    Weight from Last 3 Encounters:   07/30/18 209 lb 9.6 oz (95.1 kg)   02/05/18 207 lb 8 oz (94.1 kg)   08/01/17 208 lb 11.2 oz (94.7 kg)              Today, you had the following     No orders found for display         Today's Medication Changes          These changes are accurate as of 7/30/18  9:14 AM.  If you have any questions, ask your nurse or doctor.               Start taking these medicines.        Dose/Directions    sildenafil 20 MG tablet   Commonly known as:  REVATIO   Used for:  Erectile dysfunction, unspecified erectile dysfunction type   Started by:  Brian Mckinley PA-C        Take 2-5 tablets (40-100mg) 30 minutes to 4 hours before sex   Quantity:  30 tablet   Refills:  0         Stop taking these medicines if you haven't already. Please contact your care team if you have questions.     VIAGRA 100 MG tablet   Generic drug:  sildenafil   Stopped by:  Brian Mckinley PA-C                Where to get your medicines      These medications were sent to [facility] - [address]  [address] [address]     Phone:  [phone]     lisinopril 10 MG tablet    sildenafil 20 MG tablet                Primary Care Provider Office Phone # Fax #    Brian Mckinley PA-C [phone] [address] [facility] [address]        Equal Access to " Services     [facility]: Hadii aad ku hadcandibrady Nicole, waaxda luqadaha, qaybta kaalmalazaro naylor, gloria abdi . So [place] -[phone].    ATENCIÓN: Si habla ryne, tiene a guillen disposición servicios gratuitos de asistencia lingüística. Llame al [phone].    We comply with applicable federal civil rights laws and [place] laws. We do not discriminate on the basis of race, color, national origin, age, disability, sex, sexual orientation, or gender identity.            Thank you!     Thank you for choosing [facility]  for your care. Our goal is always to provide you with excellent care. Hearing back from our patients is one way we can continue to improve our services. Please take a few minutes to complete the written survey that you may receive in the mail after your visit with us. Thank you!             Your Updated Medication List - Protect others around you: Learn how to safely use, store and throw away your medicines at www.disposemymeds.org.          This list is accurate as of 7/30/18  9:14 AM.  Always use your most recent med list.                   Brand Name Dispense Instructions for use Diagnosis    aspirin 81 MG tablet      Take 81 mg by mouth daily        lisinopril 10 MG tablet    PRINIVIL/ZESTRIL    90 tablet    Take 1 tablet (10 mg) by mouth daily    Benign essential hypertension       sildenafil 20 MG tablet    REVATIO    30 tablet    Take 2-5 tablets (40-100mg) 30 minutes to 4 hours before sex    Erectile dysfunction, unspecified erectile dysfunction type

## 2018-07-30 NOTE — PATIENT INSTRUCTIONS
[facility]: [place] for [facility] [phone]   [email or web address]      [facility]: [facility] [phone]   [email or web address]

## 2018-07-30 NOTE — PROGRESS NOTES
SUBJECTIVE:   Otf Bolaños is a 58 year old male who presents to clinic today for the following health issues:    Hypertension Follow-up      Outpatient blood pressures are being checked occassionally     Low Salt Diet: not monitoring salt    Amount of exercise or physical activity: None    Problems taking medications regularly: No    Medication side effects: none    Diet: regular (no restrictions)    -Patient presents for blood pressure check up   -since last seen he has gained some weight   -noting that the heat this summer has made it difficult to exercise with his breathing   -continues to use O2 at night and prn  -denies any chest pain or shortness of breath; some shortness of breath when physically active   -he has cut much of his soda, only diet now  -will drink some peyman-aid on occasion    -he has not yet scheduled with pulmonology for his generally new Dx of pulmonary hypertension        Last Comprehensive Metabolic Panel:  Sodium   Date Value Ref Range Status   02/05/2018 142 133 - 144 mmol/L Final     Potassium   Date Value Ref Range Status   02/05/2018 4.4 3.4 - 5.3 mmol/L Final     Chloride   Date Value Ref Range Status   02/05/2018 107 94 - 109 mmol/L Final     Carbon Dioxide   Date Value Ref Range Status   02/05/2018 29 20 - 32 mmol/L Final     Anion Gap   Date Value Ref Range Status   02/05/2018 6 3 - 14 mmol/L Final     Glucose   Date Value Ref Range Status   02/05/2018 95 70 - 99 mg/dL Final     Comment:     Fasting specimen     Urea Nitrogen   Date Value Ref Range Status   02/05/2018 19 7 - 30 mg/dL Final     Creatinine   Date Value Ref Range Status   02/05/2018 0.91 0.66 - 1.25 mg/dL Final     GFR Estimate   Date Value Ref Range Status   02/05/2018 85 >60 mL/min/1.7m2 Final     Comment:     Non  GFR Calc     Calcium   Date Value Ref Range Status   02/05/2018 8.9 8.5 - 10.1 mg/dL Final       Problem list and histories reviewed & adjusted, as indicated.  Additional history: as  "documented    Patient Active Problem List   Diagnosis     SOB (shortness of breath)     COPD (chronic obstructive pulmonary disease) (H)     Chronic back pain     Benign essential hypertension     CARDIOVASCULAR SCREENING; LDL GOAL LESS THAN 160     Lipohypertrophy     Multiple symmetrical lipomatosis     Hx of thrombocytopenia     Past Surgical History:   Procedure Laterality Date     TRACHEOSTOMY  2/7/2014    Procedure: TRACHEOSTOMY;  TRACHEOSTOMY;  Surgeon: Myles Treadwell MD;  Location:  OR       Social History   Substance Use Topics     Smoking status: Former Smoker     Packs/day: 2.00     Years: 30.00     Quit date: 1/14/2014     Smokeless tobacco: Never Used     Alcohol use 4.5 oz/week     9 Cans of beer per week      Comment: 3 beers per month     Family History   Problem Relation Age of Onset     Hypertension Father      Myocardial Infarction Father      Cancer Paternal Grandfather            Reviewed and updated as needed this visit by clinical staff       Reviewed and updated as needed this visit by Provider         ROS:  Constitutional, HEENT, cardiovascular, pulmonary, gi and gu systems are negative, except as otherwise noted.    OBJECTIVE:     /64 (BP Location: Right arm, Patient Position: Chair, Cuff Size: Adult Large)  Pulse 91  Temp 98.4  F (36.9  C) (Tympanic)  Resp 16  Ht 5' 6\" (1.676 m)  Wt 209 lb 9.6 oz (95.1 kg)  SpO2 96%  BMI 33.83 kg/m2  Body mass index is 33.83 kg/(m^2).  GENERAL: healthy, alert and no distress  RESP: lungs sounds distant; no rales, rhonchi or wheezes  CV: regular rates and rhythm, normal S1 S2, no S3 or S4 and no murmur, click or rub  MS: no gross musculoskeletal defects noted, no edema  SKIN: scattered areas or fatty tissue swelling along the posterior cervical space, neck  and scalp  NEURO: mentation intact    Diagnostic Test Results:  none     ASSESSMENT/PLAN:   1. Benign essential hypertension  Well controlled. No CV symptoms. Refilling x 6 months. Needs " bmp in 6 months  - lisinopril (PRINIVIL/ZESTRIL) 10 MG tablet; Take 1 tablet (10 mg) by mouth daily  Dispense: 90 tablet; Refill: 1    2. Chronic obstructive pulmonary disease, unspecified COPD type (H)  3. Pulmonary hypertension  Continues on nightly and prn O2. He is NOT on an inhaler but may benefit from LAMA. Consider incruse. He did not schedule with pulmonology either. I have reiterated the importance and provided numbers once again to contact. It is unclear the type of PH, treatment would clearly be based around the specifics. Fortunately he is not all that symptomatic.     4. Erectile dysfunction, unspecified erectile dysfunction type  Refilling.   - sildenafil (REVATIO) 20 MG tablet; Take 2-5 tablets (40-100mg) 30 minutes to 4 hours before sex  Dispense: 30 tablet; Refill: 0      Brian Mckinley PA-C  [facility]

## 2019-02-11 ENCOUNTER — OFFICE VISIT (OUTPATIENT)
Dept: FAMILY MEDICINE | Facility: CLINIC | Age: 59
End: 2019-02-11
Payer: COMMERCIAL

## 2019-02-11 VITALS
OXYGEN SATURATION: 95 % | WEIGHT: 213.3 LBS | SYSTOLIC BLOOD PRESSURE: 117 MMHG | RESPIRATION RATE: 12 BRPM | TEMPERATURE: 98.6 F | HEIGHT: 66 IN | BODY MASS INDEX: 34.28 KG/M2 | DIASTOLIC BLOOD PRESSURE: 64 MMHG | HEART RATE: 96 BPM

## 2019-02-11 DIAGNOSIS — J44.9 CHRONIC OBSTRUCTIVE PULMONARY DISEASE, UNSPECIFIED COPD TYPE (H): ICD-10-CM

## 2019-02-11 DIAGNOSIS — F43.21 ADJUSTMENT DISORDER WITH DEPRESSED MOOD: ICD-10-CM

## 2019-02-11 DIAGNOSIS — Z00.01 ENCOUNTER FOR ROUTINE ADULT HEALTH EXAMINATION WITH ABNORMAL FINDINGS: Primary | ICD-10-CM

## 2019-02-11 DIAGNOSIS — I27.20 PULMONARY HYPERTENSION (H): ICD-10-CM

## 2019-02-11 DIAGNOSIS — I10 BENIGN ESSENTIAL HYPERTENSION: ICD-10-CM

## 2019-02-11 LAB
ANION GAP SERPL CALCULATED.3IONS-SCNC: 6 MMOL/L (ref 3–14)
BUN SERPL-MCNC: 18 MG/DL (ref 7–30)
CALCIUM SERPL-MCNC: 9.5 MG/DL (ref 8.5–10.1)
CHLORIDE SERPL-SCNC: 102 MMOL/L (ref 94–109)
CHOLEST SERPL-MCNC: 200 MG/DL
CO2 SERPL-SCNC: 28 MMOL/L (ref 20–32)
CREAT SERPL-MCNC: 0.94 MG/DL (ref 0.66–1.25)
GFR SERPL CREATININE-BSD FRML MDRD: 87 ML/MIN/{1.73_M2}
GLUCOSE SERPL-MCNC: 103 MG/DL (ref 70–99)
HDLC SERPL-MCNC: 55 MG/DL
LDLC SERPL CALC-MCNC: 119 MG/DL
NONHDLC SERPL-MCNC: 145 MG/DL
POTASSIUM SERPL-SCNC: 4.4 MMOL/L (ref 3.4–5.3)
SODIUM SERPL-SCNC: 136 MMOL/L (ref 133–144)
TRIGL SERPL-MCNC: 131 MG/DL

## 2019-02-11 PROCEDURE — 80048 BASIC METABOLIC PNL TOTAL CA: CPT | Performed by: PHYSICIAN ASSISTANT

## 2019-02-11 PROCEDURE — 99396 PREV VISIT EST AGE 40-64: CPT | Performed by: PHYSICIAN ASSISTANT

## 2019-02-11 PROCEDURE — 36415 COLL VENOUS BLD VENIPUNCTURE: CPT | Performed by: PHYSICIAN ASSISTANT

## 2019-02-11 PROCEDURE — 80061 LIPID PANEL: CPT | Performed by: PHYSICIAN ASSISTANT

## 2019-02-11 RX ORDER — LISINOPRIL 10 MG/1
10 TABLET ORAL DAILY
Qty: 90 TABLET | Refills: 3 | Status: SHIPPED | OUTPATIENT
Start: 2019-02-11 | End: 2020-02-26

## 2019-02-11 ASSESSMENT — PATIENT HEALTH QUESTIONNAIRE - PHQ9
5. POOR APPETITE OR OVEREATING: NOT AT ALL
SUM OF ALL RESPONSES TO PHQ QUESTIONS 1-9: 2

## 2019-02-11 ASSESSMENT — ENCOUNTER SYMPTOMS
CHILLS: 0
DIZZINESS: 0
HEMATOCHEZIA: 0
COUGH: 0
EYE PAIN: 0
HEMATURIA: 0
DIARRHEA: 0

## 2019-02-11 ASSESSMENT — ANXIETY QUESTIONNAIRES
5. BEING SO RESTLESS THAT IT IS HARD TO SIT STILL: NOT AT ALL
GAD7 TOTAL SCORE: 4
6. BECOMING EASILY ANNOYED OR IRRITABLE: NOT AT ALL
3. WORRYING TOO MUCH ABOUT DIFFERENT THINGS: SEVERAL DAYS
1. FEELING NERVOUS, ANXIOUS, OR ON EDGE: SEVERAL DAYS
2. NOT BEING ABLE TO STOP OR CONTROL WORRYING: SEVERAL DAYS
7. FEELING AFRAID AS IF SOMETHING AWFUL MIGHT HAPPEN: SEVERAL DAYS
IF YOU CHECKED OFF ANY PROBLEMS ON THIS QUESTIONNAIRE, HOW DIFFICULT HAVE THESE PROBLEMS MADE IT FOR YOU TO DO YOUR WORK, TAKE CARE OF THINGS AT HOME, OR GET ALONG WITH OTHER PEOPLE: NOT DIFFICULT AT ALL

## 2019-02-11 ASSESSMENT — MIFFLIN-ST. JEOR: SCORE: 1712.33

## 2019-02-11 NOTE — LETTER
February 12, 2019      Otf Bolaños  [address] [place] [address]          Hi David!    Good seeing you as always.  The labs look ok.  The cholesterol has stayed gone up again from last year. Back to the old levels.  At this point it is important to keep that major focus on healthy eating and getting more active. We have to check within one year, and could consider rechecking in 6 months.    Screening of the kidneys and electrolytes looked within the expected ranges. The blood sugars were just slightly into the pre-diabetic range, up from last year. Same treatment as above - mostly with diet and exercise! Again, we'll need to recheck within one year.    Please let me know any questions and continue to work on lifestyle changes. And please come back if you think we could help your moods in any way as discussed at your visit.    Marquis Mckinley PA-C

## 2019-02-11 NOTE — PROGRESS NOTES
"SUBJECTIVE:   CC: Otf Bolaños is an 58 year old male who presents for preventative health visit.     Physical   Annual:     Getting at least 3 servings of Calcium per day:  NO    Bi-annual eye exam:  NO    Dental care twice a year:  NO    Sleep apnea or symptoms of sleep apnea:  None    Diet:  Breakfast skipped    Duration of exercise:  15-30 minutes    Additional concerns today:  No    PHQ-2 Total Score: 2    -Patient presents for annual physical  -He continues with [facility] regarding lipomatosis    -he had some fat removed on the upper back but has had some recurrence of neck/facial fat  deposit  -Other than this he feels ok  -Has sharma some weight - \"havent been as honest with my diet\"  -eating more fruit  -not as active as he'd like  -has been walking regularly  -using O2 every night and after a lot of increased activity      Today's PHQ-2 Score:   PHQ-2 (  [employer]) 2019   Q1: Little interest or pleasure in doing things 1   Q2: Feeling down, depressed or hopeless 1   PHQ-2 Score 2   Q1: Little interest or pleasure in doing things Several days   Q2: Feeling down, depressed or hopeless Several days   PHQ-2 Score 2       Abuse: Current or Past(Physical, Sexual or Emotional)- No  Do you feel safe in your environment? Yes    Social History     Tobacco Use     Smoking status: Former Smoker     Packs/day: 2.00     Years: 30.00     Pack years: 60.00     Last attempt to quit: 2014     Years since quittin.0     Smokeless tobacco: Never Used   Substance Use Topics     Alcohol use: Yes     Alcohol/week: 4.5 oz     Types: 9 Cans of beer per week     Comment: 3 beers per month     Alcohol Use 2019   If you drink alcohol do you typically have greater than 3 drinks per day OR greater than 7 drinks per week? Not Applicable     Last PSA:   PSA   Date Value Ref Range Status   2018 0.45 0 - 4 ug/L Final     Comment:     Assay Method:  Chemiluminescence using Siemens Vista analyzer       Reviewed orders " "with patient. Reviewed health maintenance and updated orders accordingly - Yes  Labs reviewed in EPIC    Reviewed and updated as needed this visit by clinical staff         Reviewed and updated as needed this visit by Provider            Review of Systems   Constitutional: Negative for chills.   HENT: Negative for congestion and ear pain.    Eyes: Negative for pain.   Respiratory: Negative for cough.    Cardiovascular: Negative for chest pain.   Gastrointestinal: Negative for diarrhea and hematochezia.   Genitourinary: Negative for hematuria.   Neurological: Negative for dizziness.       OBJECTIVE:   /64   Pulse 96   Temp 98.6  F (37  C) (Tympanic)   Resp 12   Ht 1.664 m (5' 5.5\")   Wt 96.8 kg (213 lb 4.8 oz)   SpO2 95%   BMI 34.96 kg/m      Physical Exam  GENERAL: healthy, alert and no distress  EYES: Eyes grossly normal to inspection, PERRL and conjunctivae and sclerae normal  HENT: ear canals and TM's normal, nose and mouth without ulcers or lesions  NECK: there is some left lateral swelling in the neck and excess tissue throughout the anterior and right lateral neck space  RESP: lungs clear to auscultation - no rales, rhonchi or wheezes  CV: regular rates and rhythm  ABDOMEN: soft, nontender, no hepatosplenomegaly, no masses and bowel sounds normal  MS: no gross musculoskeletal defects noted, no edema  SKIN: no suspicious lesions or rashes  PSYCH: mentation appears normal and affect flat    Diagnostic Test Results:  See A/P    ASSESSMENT/PLAN:   1. Encounter for routine adult health examination with abnormal findings  Reviewed personal and family history. Reviewed age appropriate screenings. Recommended any needed vaccinations.  - Basic metabolic panel  - Lipid panel reflex to direct LDL Fasting    2. Benign essential hypertension  Controlled. Continue present management.   - lisinopril (PRINIVIL/ZESTRIL) 10 MG tablet; Take 1 tablet (10 mg) by mouth daily  Dispense: 90 tablet; Refill: 3    3. " "Pulmonary hypertension (H)  Well controlled. He uses O2 at night and with heavy activity but not needing otherwise. Losing weight and getting more active would be beneficial and we reviewed this.     4. Chronic obstructive pulmonary disease, unspecified COPD type (H)  See #3    5. Adjustment disorder with depressed mood  I worry about David. He does admit to feeling overwhelmed lately with living situation (son requiring a lot of help). He does admit to feeling more down, lower energy, less enjoyment. He does not want to consider therapy. He would consider medication but wanted to discuss this with his wife and think about things. He'll follow up if he thinks starting medications is something he'd consider      COUNSELING:   Reviewed preventive health counseling, as reflected in patient instructions    BP Readings from Last 1 Encounters:   07/30/18 102/64     Estimated body mass index is 33.83 kg/m  as calculated from the following:    Height as of 7/30/18: 1.676 m (5' 6\").    Weight as of 7/30/18: 95.1 kg (209 lb 9.6 oz).      Weight management plan: Discussed healthy diet and exercise guidelines     reports that he quit smoking about 5 years ago. He has a 60.00 pack-year smoking history. he has never used smokeless tobacco.      Counseling Resources:  ATP IV Guidelines  Pooled Cohorts Equation Calculator  FRAX Risk Assessment  ICSI Preventive Guidelines  Dietary Guidelines for Americans, 2010  USDA's MyPlate  ASA Prophylaxis  Lung CA Screening    Brian Mckinley PA-C  [facility]  "

## 2019-02-12 ASSESSMENT — ANXIETY QUESTIONNAIRES: GAD7 TOTAL SCORE: 4

## 2019-03-04 DIAGNOSIS — I10 BENIGN ESSENTIAL HYPERTENSION: ICD-10-CM

## 2019-03-04 NOTE — TELEPHONE ENCOUNTER
"Requested Prescriptions   Pending Prescriptions Disp Refills     lisinopril (PRINIVIL/ZESTRIL) 10 MG tablet [Pharmacy Med Name: LISINOPRIL 10MG TABS]  Last Written Prescription Date:  2/11/19  Last Fill Quantity: 90,  # refills: 3   Last office visit: 2/11/2019 with prescribing provider:  Brian Mckinley PA-C    Future Office Visit:     30 tablet 0     Sig: TAKE ONE TABLET BY MOUTH ONCE DAILY    ACE Inhibitors (Including Combos) Protocol Passed - 3/4/2019  9:55 AM       Passed - Blood pressure under 140/90 in past 12 months    BP Readings from Last 3 Encounters:   02/11/19 117/64   07/30/18 102/64   02/05/18 122/77                Passed - Recent (12 mo) or future (30 days) visit within the authorizing provider's specialty    Patient had office visit in the last 12 months or has a visit in the next 30 days with authorizing provider or within the authorizing provider's specialty.  See \"Patient Info\" tab in inbasket, or \"Choose Columns\" in Meds & Orders section of the refill encounter.             Passed - Medication is active on med list       Passed - Patient is age 18 or older       Passed - Normal serum creatinine on file in past 12 months    Recent Labs   Lab Test 02/11/19  0857   CR 0.94            Passed - Normal serum potassium on file in past 12 months    Recent Labs   Lab Test 02/11/19  0857   POTASSIUM 4.4               "

## 2019-03-07 RX ORDER — LISINOPRIL 10 MG/1
TABLET ORAL
Start: 2019-03-07

## 2019-10-17 ENCOUNTER — MEDICAL CORRESPONDENCE (OUTPATIENT)
Dept: HEALTH INFORMATION MANAGEMENT | Facility: CLINIC | Age: 59
End: 2019-10-17

## 2019-12-10 ENCOUNTER — OFFICE VISIT (OUTPATIENT)
Dept: FAMILY MEDICINE | Facility: CLINIC | Age: 59
End: 2019-12-10
Payer: COMMERCIAL

## 2019-12-10 VITALS
DIASTOLIC BLOOD PRESSURE: 60 MMHG | WEIGHT: 217.8 LBS | TEMPERATURE: 98 F | HEIGHT: 66 IN | HEART RATE: 100 BPM | RESPIRATION RATE: 18 BRPM | BODY MASS INDEX: 35 KG/M2 | OXYGEN SATURATION: 95 % | SYSTOLIC BLOOD PRESSURE: 104 MMHG

## 2019-12-10 DIAGNOSIS — J06.9 VIRAL URI WITH COUGH: Primary | ICD-10-CM

## 2019-12-10 DIAGNOSIS — E66.01 MORBID OBESITY (H): ICD-10-CM

## 2019-12-10 PROCEDURE — 99213 OFFICE O/P EST LOW 20 MIN: CPT | Performed by: PHYSICIAN ASSISTANT

## 2019-12-10 ASSESSMENT — MIFFLIN-ST. JEOR: SCORE: 1727.74

## 2019-12-10 NOTE — PROGRESS NOTES
Subjective     Otf Bolaños is a 59 year old male who presents to clinic today for the following health issues:    HPI   RESPIRATORY SYMPTOMS      Duration: 1 day     Description  sore throat, cough and ear pain bilateral    Severity: moderate    Accompanying signs and symptoms: chest heaviness     History (predisposing factors): SOB, COPD    Precipitating or alleviating factors: None    Therapies tried and outcome: Throat tablets     Otf Bolaños is a 59 year old male who presents today for new onset upper respiratory symptoms   Yesterday morning he developed increased throat pain  Mild cough; no shortness of breath   Ear pain of both sides  No HA  Has had flu shot  Mildly improved today  Did take some chloraseptic tablets        Patient Active Problem List   Diagnosis     SOB (shortness of breath)     COPD (chronic obstructive pulmonary disease) (H)     Chronic back pain     Benign essential hypertension     CARDIOVASCULAR SCREENING; LDL GOAL LESS THAN 160     Lipohypertrophy     Multiple symmetrical lipomatosis     Hx of thrombocytopenia     Pulmonary hypertension (H)     Obesity (BMI 35.0-39.9) with comorbidity (H)     Past Surgical History:   Procedure Laterality Date     TRACHEOSTOMY  2014    Procedure: TRACHEOSTOMY;  TRACHEOSTOMY;  Surgeon: Myles Treadwell MD;  Location:  OR       Social History     Tobacco Use     Smoking status: Former Smoker     Packs/day: 2.00     Years: 30.00     Pack years: 60.00     Last attempt to quit: 2014     Years since quittin.9     Smokeless tobacco: Never Used   Substance Use Topics     Alcohol use: Not Currently     Family History   Problem Relation Age of Onset     Hypertension Father      Myocardial Infarction Father      Cancer Paternal Grandfather            Reviewed and updated as needed this visit by Provider         Review of Systems   ROS COMP: Constitutional, HEENT, cardiovascular, pulmonary, gi and gu systems are negative, except as otherwise  "noted.      Objective    /60   Pulse 100   Temp 98  F (36.7  C) (Tympanic)   Resp 18   Ht 1.664 m (5' 5.5\")   Wt 98.8 kg (217 lb 12.8 oz)   SpO2 95%   BMI 35.69 kg/m    Body mass index is 35.69 kg/m .  Physical Exam   GENERAL: healthy, alert and no distress  EYES: Eyes grossly normal to inspection  HENT: normal cephalic/atraumatic, ear canals and TM's normal, nasal mucosa edematous , oropharynx raw and sinuses: not tender  RESP: lungs clear to auscultation - no rales, rhonchi or wheezes  CV: regular rates and rhythm    Diagnostic Test Results:  none         Assessment & Plan     1. Viral URI with cough  Exam and vitals look good. Lungs clear. No obvious adenopathy palpated to neck though habitus makes difficult. Recommend initially increasing supportive cares. To follow up if not improving or worsneing.    2. Morbid obesity (H)  Reviewed diet/exercise recs; I do think this would help some of the appearance he is concerned with re his neck       BMI:   Estimated body mass index is 35.69 kg/m  as calculated from the following:    Height as of this encounter: 1.664 m (5' 5.5\").    Weight as of this encounter: 98.8 kg (217 lb 12.8 oz).       Return in about 1 week (around 12/17/2019) for recheck if symptoms are not improving..    Brian Mckinley PA-C  [facility]      "

## 2020-02-17 DIAGNOSIS — I10 BENIGN ESSENTIAL HYPERTENSION: ICD-10-CM

## 2020-02-17 LAB
ANION GAP SERPL CALCULATED.3IONS-SCNC: 4 MMOL/L (ref 3–14)
BUN SERPL-MCNC: 18 MG/DL (ref 7–30)
CALCIUM SERPL-MCNC: 9.1 MG/DL (ref 8.5–10.1)
CHLORIDE SERPL-SCNC: 105 MMOL/L (ref 94–109)
CO2 SERPL-SCNC: 28 MMOL/L (ref 20–32)
CREAT SERPL-MCNC: 0.91 MG/DL (ref 0.66–1.25)
GFR SERPL CREATININE-BSD FRML MDRD: 90 ML/MIN/{1.73_M2}
GLUCOSE SERPL-MCNC: 121 MG/DL (ref 70–99)
POTASSIUM SERPL-SCNC: 4.1 MMOL/L (ref 3.4–5.3)
SODIUM SERPL-SCNC: 137 MMOL/L (ref 133–144)

## 2020-02-17 PROCEDURE — 36415 COLL VENOUS BLD VENIPUNCTURE: CPT | Performed by: PHYSICIAN ASSISTANT

## 2020-02-17 PROCEDURE — 80048 BASIC METABOLIC PNL TOTAL CA: CPT | Performed by: PHYSICIAN ASSISTANT

## 2020-02-25 DIAGNOSIS — I10 BENIGN ESSENTIAL HYPERTENSION: ICD-10-CM

## 2020-02-25 NOTE — TELEPHONE ENCOUNTER
"Requested Prescriptions   Pending Prescriptions Disp Refills     lisinopril (ZESTRIL) 10 MG tablet [Pharmacy Med Name: LISINOPRIL 10MG TABS] 90 tablet 3     Sig: TAKE ONE TABLET BY MOUTH ONCE DAILY   Last Written Prescription Date:  2/11/19  Last Fill Quantity: 90,  # refills: 3   Last office visit: 12/10/2019 with prescribing provider:  Brian Mckinley PA-C   Future Office Visit:        ACE Inhibitors (Including Combos) Protocol Passed - 2/25/2020  5:03 AM        Passed - Blood pressure under 140/90 in past 12 months     BP Readings from Last 3 Encounters:   12/10/19 104/60   02/11/19 117/64   07/30/18 102/64                 Passed - Recent (12 mo) or future (30 days) visit within the authorizing provider's specialty     Patient has had an office visit with the authorizing provider or a provider within the authorizing providers department within the previous 12 mos or has a future within next 30 days. See \"Patient Info\" tab in inbasket, or \"Choose Columns\" in Meds & Orders section of the refill encounter.              Passed - Medication is active on med list        Passed - Patient is age 18 or older        Passed - Normal serum creatinine on file in past 12 months     Recent Labs   Lab Test 02/17/20  0814   CR 0.91             Passed - Normal serum potassium on file in past 12 months     Recent Labs   Lab Test 02/17/20  0814   POTASSIUM 4.1              "

## 2020-02-26 RX ORDER — LISINOPRIL 10 MG/1
TABLET ORAL
Qty: 30 TABLET | Refills: 0 | Status: SHIPPED | OUTPATIENT
Start: 2020-02-26 | End: 2020-03-21

## 2020-02-26 NOTE — TELEPHONE ENCOUNTER
Medication is being filled for 1 time refill only due to:  Patient needs to be seen because due for annual exam. Message sent to pharmacy.  ABISAI GonsalesN, RN

## 2020-07-09 DIAGNOSIS — I10 BENIGN ESSENTIAL HYPERTENSION: ICD-10-CM

## 2020-07-09 RX ORDER — LISINOPRIL 10 MG/1
TABLET ORAL
Qty: 30 TABLET | Refills: 3
Start: 2020-07-09

## 2020-11-01 DIAGNOSIS — I10 BENIGN ESSENTIAL HYPERTENSION: ICD-10-CM

## 2020-11-01 RX ORDER — LISINOPRIL 10 MG/1
TABLET ORAL
Qty: 30 TABLET | Refills: 1 | Status: SHIPPED | OUTPATIENT
Start: 2020-11-01 | End: 2020-12-31

## 2020-11-11 ENCOUNTER — TELEPHONE (OUTPATIENT)
Dept: FAMILY MEDICINE | Facility: CLINIC | Age: 60
End: 2020-11-11

## 2020-12-30 DIAGNOSIS — I10 BENIGN ESSENTIAL HYPERTENSION: ICD-10-CM

## 2020-12-31 RX ORDER — LISINOPRIL 10 MG/1
TABLET ORAL
Qty: 30 TABLET | Refills: 0 | Status: SHIPPED | OUTPATIENT
Start: 2020-12-31 | End: 2021-01-29

## 2020-12-31 NOTE — TELEPHONE ENCOUNTER
Medication is being filled for 1 time refill only due to:  Patient needs to be seen because it has been more than one year since last visit.     Zara Dueñas RN

## 2021-01-28 DIAGNOSIS — I10 BENIGN ESSENTIAL HYPERTENSION: ICD-10-CM

## 2021-01-29 RX ORDER — LISINOPRIL 10 MG/1
TABLET ORAL
Qty: 30 TABLET | Refills: 0 | Status: SHIPPED | OUTPATIENT
Start: 2021-01-29 | End: 2021-02-01

## 2021-01-29 NOTE — TELEPHONE ENCOUNTER
Medication is being filled for 1 time refill only due to:  Patient needs to be seen because due for visit and labs..     Next 5 appointments (look out 90 days)    Feb 01, 2021  7:20 AM  Office Visit with Brian Mckinley PA-C  [facility] ([facility]) [id number] [address]  [phone]        Zara Dueñas RN

## 2021-02-01 ENCOUNTER — OFFICE VISIT (OUTPATIENT)
Dept: FAMILY MEDICINE | Facility: CLINIC | Age: 61
End: 2021-02-01
Payer: COMMERCIAL

## 2021-02-01 VITALS
TEMPERATURE: 98.2 F | HEART RATE: 102 BPM | OXYGEN SATURATION: 92 % | HEIGHT: 66 IN | RESPIRATION RATE: 24 BRPM | DIASTOLIC BLOOD PRESSURE: 66 MMHG | WEIGHT: 222 LBS | SYSTOLIC BLOOD PRESSURE: 110 MMHG | BODY MASS INDEX: 35.68 KG/M2

## 2021-02-01 DIAGNOSIS — I10 BENIGN ESSENTIAL HYPERTENSION: ICD-10-CM

## 2021-02-01 DIAGNOSIS — Z00.00 WELLNESS EXAMINATION: Primary | ICD-10-CM

## 2021-02-01 DIAGNOSIS — E88.2: ICD-10-CM

## 2021-02-01 DIAGNOSIS — I27.20 PULMONARY HYPERTENSION (H): ICD-10-CM

## 2021-02-01 DIAGNOSIS — J44.9 CHRONIC OBSTRUCTIVE PULMONARY DISEASE, UNSPECIFIED COPD TYPE (H): ICD-10-CM

## 2021-02-01 DIAGNOSIS — F43.21 ADJUSTMENT DISORDER WITH DEPRESSED MOOD: ICD-10-CM

## 2021-02-01 DIAGNOSIS — E66.01 MORBID OBESITY (H): ICD-10-CM

## 2021-02-01 LAB
ALBUMIN SERPL-MCNC: 3.9 G/DL (ref 3.4–5)
ALP SERPL-CCNC: 72 U/L (ref 40–150)
ALT SERPL W P-5'-P-CCNC: 29 U/L (ref 0–70)
ANION GAP SERPL CALCULATED.3IONS-SCNC: 1 MMOL/L (ref 3–14)
AST SERPL W P-5'-P-CCNC: 19 U/L (ref 0–45)
BILIRUB SERPL-MCNC: 0.7 MG/DL (ref 0.2–1.3)
BUN SERPL-MCNC: 18 MG/DL (ref 7–30)
CALCIUM SERPL-MCNC: 10.1 MG/DL (ref 8.5–10.1)
CHLORIDE SERPL-SCNC: 103 MMOL/L (ref 94–109)
CHOLEST SERPL-MCNC: 151 MG/DL
CO2 SERPL-SCNC: 32 MMOL/L (ref 20–32)
CREAT SERPL-MCNC: 1.06 MG/DL (ref 0.66–1.25)
GFR SERPL CREATININE-BSD FRML MDRD: 74 ML/MIN/{1.73_M2}
GLUCOSE SERPL-MCNC: 106 MG/DL (ref 70–99)
HDLC SERPL-MCNC: 51 MG/DL
LDLC SERPL CALC-MCNC: 76 MG/DL
NONHDLC SERPL-MCNC: 100 MG/DL
POTASSIUM SERPL-SCNC: 4.6 MMOL/L (ref 3.4–5.3)
PROT SERPL-MCNC: 7.7 G/DL (ref 6.8–8.8)
SODIUM SERPL-SCNC: 136 MMOL/L (ref 133–144)
TRIGL SERPL-MCNC: 118 MG/DL

## 2021-02-01 PROCEDURE — 99214 OFFICE O/P EST MOD 30 MIN: CPT | Mod: 25 | Performed by: PHYSICIAN ASSISTANT

## 2021-02-01 PROCEDURE — G0438 PPPS, INITIAL VISIT: HCPCS | Performed by: PHYSICIAN ASSISTANT

## 2021-02-01 PROCEDURE — 80061 LIPID PANEL: CPT | Performed by: PHYSICIAN ASSISTANT

## 2021-02-01 PROCEDURE — 80053 COMPREHEN METABOLIC PANEL: CPT | Performed by: PHYSICIAN ASSISTANT

## 2021-02-01 PROCEDURE — 36415 COLL VENOUS BLD VENIPUNCTURE: CPT | Performed by: PHYSICIAN ASSISTANT

## 2021-02-01 RX ORDER — ALBUTEROL SULFATE 90 UG/1
2 AEROSOL, METERED RESPIRATORY (INHALATION) EVERY 6 HOURS
Qty: 1 INHALER | Refills: 3 | Status: SHIPPED | OUTPATIENT
Start: 2021-02-01 | End: 2022-01-24

## 2021-02-01 RX ORDER — LISINOPRIL 10 MG/1
TABLET ORAL
Qty: 90 TABLET | Refills: 1 | Status: ON HOLD | OUTPATIENT
Start: 2021-02-01 | End: 2021-05-01

## 2021-02-01 ASSESSMENT — MIFFLIN-ST. JEOR: SCORE: 1741.8

## 2021-02-01 NOTE — PROGRESS NOTES
"  Assessment & Plan     Wellness examination  Reviewed personal and family history. Reviewed age appropriate screenings. Recommended any needed vaccinations. David remains largely hands off when it comes to preventive care. We'll continue to discuss    Morbid obesity (H)  He needs to continue a focus on healthier eating and remaining active  - Lipid panel reflex to direct LDL Fasting    Pulmonary hypertension (H)  Chronic obstructive pulmonary disease, unspecified COPD type (H)  David's O2 was slightly lower than last year. He attributes this to his mask and says that it is better at home. He mentions some increased shortness of breath with stairs but otherwise no change. He wears o2 at night and with activity. He continues to decline alternative inhalation treatment or specialty discussion. If his shortness of breath increases we will need to work this up - both cardiac and pulmonary. He verbalized understanding  - albuterol (PROAIR HFA/PROVENTIL HFA/VENTOLIN HFA) 108 (90 Base) MCG/ACT inhaler; Inhale 2 puffs into the lungs every 6 hours    Multiple symmetrical lipomatosis  Continues follow up with [facility] essential hypertension  Controlled. Continue present management.   - Comprehensive metabolic panel (BMP + Alb, Alk Phos, ALT, AST, Total. Bili, TP)  - lisinopril (ZESTRIL) 10 MG tablet; Take one tablet (10mg) by mouth once daily    Adjustment disorder with depressed mood  I have strongly recommended he consider either therapy, medication or both. He still defers    BMI:   Estimated body mass index is 36.38 kg/m  as calculated from the following:    Height as of this encounter: 1.664 m (5' 5.5\").    Weight as of this encounter: 100.7 kg (222 lb).   Weight management plan: Discussed healthy diet and exercise guidelines    Return in about 3 months (around 5/1/2021) for Recheck on mood and breathing.    VIC Castellano [facility] RUBEN Vanessa is a 60 year old who " presents to clinic today for the following health issues     HPI     Hyperlipidemia Follow-Up      Are you regularly taking any medication or supplement to lower your cholesterol?   No    Are you having muscle aches or other side effects that you think could be caused by your cholesterol lowering medication?  N/a    Hypertension Follow-up      Do you check your blood pressure regularly outside of the clinic? No     Are you following a low salt diet? No    Are your blood pressures ever more than 140 on the top number (systolic) OR more than 90 on the bottom number (diastolic), for example 140/90? N/a    Otf MERRY Bolaños is a 60 year old male who presents today for multiple med check  Feels well overall; did have a recent episode of stomach pain after eating pizza; this seemed to last for the whole night  Improved with pepto bismol and tums, 7-up  No nausea or vomiting  No cough, fever, HA, chills  No pain today  Normal BMs today; no darkening    He does not appreciate wearing the masks during the public health crisis  He does think this affects his breathing quite a bit  He has not been active since last seen  He does admit moods are lower due to quarantine  Hoping for better days ahead  Does admit to stress over his excess neck tissue      Requesting albuterol inhaler-it has helped in the past.   Notes when walking one flight of stairs feels out of breath and oxygen decreases to 88.   This is NOT new for him; has stayed like this since his hospital discharge in 2014  Denies any shortness of breath when sitting around   Currently on 2 L of oxygen when sleeping and prn.  He denies any chest pain or extremity swelling     Annual Wellness Visit    Patient has been advised of split billing requirements and indicates understanding: Yes     Are you in the first 12 months of your Medicare Part B coverage?  No    Physical Health:    In general, how would you rate your overall physical health? good    Outside of work, how  "many days during the week do you exercise?none    Outside of work, approximately how many minutes a day do you exercise?not applicable    If you drink alcohol do you typically have >3 drinks per day or >7 drinks per week? No    Do you usually eat at least 4 servings of fruit and vegetables a day, include whole grains & fiber and avoid regularly eating high fat or \"junk\" foods? NO    Do you have any problems taking medications regularly? No    Do you have any side effects from medications? none    Needs assistance for the following daily activities: no assistance needed    Which of the following safety concerns are present in your home?  none identified     Hearing impairment: No    In the past 6 months, have you been bothered by leaking of urine? States not really    Mental Health:    In general, how would you rate your overall mental or emotional health? good  PHQ-2 Score:      Do you feel safe in your environment? Yes    Have you ever done Advance Care Planning? (For example, a Health Directive, POLST, or a discussion with a medical provider or your loved ones about your wishes)? Declined    Fall risk:  Fallen 2 or more times in the past year?: No  Any fall with injury in the past year?: No    Cognitive Screenin) Repeat 3 items (Leader, Season, Table)    2) Clock draw: NORMAL  3) 3 item recall: Recalls 3 objects  Results: 3 items recalled: COGNITIVE IMPAIRMENT LESS LIKELY    Mini-CogTM Copyright STEVIE Alexander. Licensed by the author for use in [facility]; reprinted with permission (john@.[facility]). All rights reserved.      Do you have sleep apnea, excessive snoring or daytime drowsiness?: no    Current providers sharing in care for this patient include:   Patient Care Team:  Brian Mckinley PA-C as PCP - General (Physician Assistant)  Brian Mckinley PA-C as Assigned PCP    Patient has been advised of split billing requirements and indicates understanding: Yes      Review of Systems " "  ROS negative otherwise except noted elsewhere      Objective    /66 (BP Location: Right arm, Patient Position: Chair, Cuff Size: Adult Regular)   Pulse 102   Temp 98.2  F (36.8  C) (Oral)   Resp 24   Ht 1.664 m (5' 5.5\")   Wt 100.7 kg (222 lb)   SpO2 92%   BMI 36.38 kg/m    Body mass index is 36.38 kg/m .  Physical Exam   GENERAL: healthy, alert and no distress  EYES: Eyes grossly normal to inspection; miosis b/l  HENT: ear canals and TM's normal, nose and mouth without ulcers or lesions  NECK: there is diffuse excess tissue surrounding the neck space and the posterior buffalo hump  RESP: lungs clear to auscultation - no rales, rhonchi or wheezes  CV: tachycardia, normal S1 S2, no S3 or S4 and no murmur, click or rub  ABDOMEN: soft, nontender, no hepatosplenomegaly, no masses and bowel sounds normal  MS: No peripheral edema   SKIN: no suspicious lesions or rashes  NEURO: Normal strength and tone, mentation intact and speech normal  PSYCH: mentation appears normal, affect normal/bright          "

## 2021-02-01 NOTE — PATIENT INSTRUCTIONS
1. Consider getting the SHINGLES vaccine  2. Please let me know if you change your mind on the lungs or the heart imaging  3. GET the COVID vaccine when it becomes available to you    Preventive Health Recommendations  See your health care provider every year to    Review health changes.     Discuss preventive care.      Review your medicines if your doctor has prescribed any.    Talk with your health care provider about whether you should have a test to screen for prostate cancer (PSA).    Every 3 years, have a diabetes test (fasting glucose). If you are at risk for diabetes, you should have this test more often.    Every 5 years, have a cholesterol test. Have this test more often if you are at risk for high cholesterol or heart disease.     Every 10 years, have a colonoscopy. Or, have a yearly FIT test (stool test). These exams will check for colon cancer.    Talk to with your health care provider about screening for Abdominal Aortic Aneurysm if you have a family history of AAA or have a history of smoking.    Shots:     Get a flu shot each year.     Get a tetanus shot every 10 years.     Talk to your doctor about your pneumonia vaccines. There are now two you should receive - Pneumovax (PPSV 23) and Prevnar (PCV 13).    Talk to your pharmacist about a shingles vaccine.     Talk to your doctor about the hepatitis B vaccine.    Nutrition:     Eat at least 5 servings of fruits and vegetables each day.     Eat whole-grain bread, whole-wheat pasta and brown rice instead of white grains and rice.     Get adequate Calcium and Vitamin D.     Lifestyle    Exercise for at least 150 minutes a week (30 minutes a day, 5 days a week). This will help you control your weight and prevent disease.     Limit alcohol to one drink per day.     No smoking.     Wear sunscreen to prevent skin cancer.     See your dentist every six months for an exam and cleaning.     See your eye doctor every 1 to 2 years to screen for conditions such  as glaucoma, macular degeneration and cataracts.    Personalized Prevention Plan  You are due for the preventive services outlined below.  Your care team is available to assist you in scheduling these services.  If you have already completed any of these items, please share that information with your care team to update in your medical record.  Health Maintenance   Topic Date Due     SPIROMETRY  1960     COPD ACTION PLAN  1960     ZOSTER IMMUNIZATION (1 of 2) 03/20/2008     ADVANCE CARE PLANNING  06/01/2020     MEDICARE ANNUAL WELLNESS VISIT  02/01/2022     ANNUAL REVIEW OF HM ORDERS  02/01/2022     DTAP/TDAP/TD IMMUNIZATION (2 - Td) 02/21/2022     LIPID  02/11/2024     COLORECTAL CANCER SCREENING  10/26/2025     HEPATITIS C SCREENING  Completed     HIV SCREENING  Completed     PHQ-2  Completed     INFLUENZA VACCINE  Completed     Pneumococcal Vaccine: Pediatrics (0 to 5 Years) and At-Risk Patients (6 to 64 Years)  Aged Out     IPV IMMUNIZATION  Aged Out     MENINGITIS IMMUNIZATION  Aged Out     HEPATITIS B IMMUNIZATION  Aged Out

## 2021-04-29 ENCOUNTER — NURSE TRIAGE (OUTPATIENT)
Dept: NURSING | Facility: CLINIC | Age: 61
End: 2021-04-29

## 2021-04-29 ENCOUNTER — APPOINTMENT (OUTPATIENT)
Dept: CT IMAGING | Facility: CLINIC | Age: 61
End: 2021-04-29
Attending: EMERGENCY MEDICINE
Payer: COMMERCIAL

## 2021-04-29 ENCOUNTER — HOSPITAL ENCOUNTER (EMERGENCY)
Facility: CLINIC | Age: 61
Discharge: ANOTHER HOSPITAL | End: 2021-04-30
Attending: EMERGENCY MEDICINE | Admitting: EMERGENCY MEDICINE
Payer: COMMERCIAL

## 2021-04-29 DIAGNOSIS — I65.21 RIGHT CAROTID ARTERY OCCLUSION: Primary | ICD-10-CM

## 2021-04-29 DIAGNOSIS — R20.2 NUMBNESS AND TINGLING IN LEFT HAND: ICD-10-CM

## 2021-04-29 DIAGNOSIS — R20.0 NUMBNESS AND TINGLING IN LEFT HAND: ICD-10-CM

## 2021-04-29 DIAGNOSIS — H53.131 VISION, LOSS, SUDDEN, RIGHT: ICD-10-CM

## 2021-04-29 LAB
ANION GAP SERPL CALCULATED.3IONS-SCNC: <1 MMOL/L (ref 3–14)
BASOPHILS # BLD AUTO: 0.1 10E9/L (ref 0–0.2)
BASOPHILS NFR BLD AUTO: 0.5 %
BUN SERPL-MCNC: 14 MG/DL (ref 7–30)
CALCIUM SERPL-MCNC: 9 MG/DL (ref 8.5–10.1)
CHLORIDE SERPL-SCNC: 105 MMOL/L (ref 94–109)
CO2 SERPL-SCNC: 32 MMOL/L (ref 20–32)
CREAT SERPL-MCNC: 0.9 MG/DL (ref 0.66–1.25)
DIFFERENTIAL METHOD BLD: ABNORMAL
EOSINOPHIL # BLD AUTO: 0.2 10E9/L (ref 0–0.7)
EOSINOPHIL NFR BLD AUTO: 1.9 %
ERYTHROCYTE [DISTWIDTH] IN BLOOD BY AUTOMATED COUNT: 12.8 % (ref 10–15)
GFR SERPL CREATININE-BSD FRML MDRD: >90 ML/MIN/{1.73_M2}
GLUCOSE BLDC GLUCOMTR-MCNC: 94 MG/DL (ref 70–99)
GLUCOSE SERPL-MCNC: 90 MG/DL (ref 70–99)
HCT VFR BLD AUTO: 47.1 % (ref 40–53)
HGB BLD-MCNC: 15.3 G/DL (ref 13.3–17.7)
IMM GRANULOCYTES # BLD: 0 10E9/L (ref 0–0.4)
IMM GRANULOCYTES NFR BLD: 0.3 %
INR PPP: 0.99 (ref 0.86–1.14)
LABORATORY COMMENT REPORT: NORMAL
LYMPHOCYTES # BLD AUTO: 2 10E9/L (ref 0.8–5.3)
LYMPHOCYTES NFR BLD AUTO: 15.7 %
MCH RBC QN AUTO: 29.7 PG (ref 26.5–33)
MCHC RBC AUTO-ENTMCNC: 32.5 G/DL (ref 31.5–36.5)
MCV RBC AUTO: 92 FL (ref 78–100)
MONOCYTES # BLD AUTO: 1 10E9/L (ref 0–1.3)
MONOCYTES NFR BLD AUTO: 8 %
NEUTROPHILS # BLD AUTO: 9.2 10E9/L (ref 1.6–8.3)
NEUTROPHILS NFR BLD AUTO: 73.6 %
NRBC # BLD AUTO: 0 10*3/UL
NRBC BLD AUTO-RTO: 0 /100
PLATELET # BLD AUTO: 154 10E9/L (ref 150–450)
POTASSIUM SERPL-SCNC: 4.1 MMOL/L (ref 3.4–5.3)
RBC # BLD AUTO: 5.15 10E12/L (ref 4.4–5.9)
SARS-COV-2 RNA RESP QL NAA+PROBE: NEGATIVE
SODIUM SERPL-SCNC: 137 MMOL/L (ref 133–144)
SPECIMEN SOURCE: NORMAL
TROPONIN I SERPL-MCNC: <0.015 UG/L (ref 0–0.04)
TSH SERPL DL<=0.005 MIU/L-ACNC: 1.18 MU/L (ref 0.4–4)
WBC # BLD AUTO: 12.5 10E9/L (ref 4–11)

## 2021-04-29 PROCEDURE — 250N000011 HC RX IP 250 OP 636: Performed by: EMERGENCY MEDICINE

## 2021-04-29 PROCEDURE — 999N001017 HC STATISTIC GLUCOSE BY METER IP

## 2021-04-29 PROCEDURE — 85025 COMPLETE CBC W/AUTO DIFF WBC: CPT | Performed by: EMERGENCY MEDICINE

## 2021-04-29 PROCEDURE — 84484 ASSAY OF TROPONIN QUANT: CPT | Performed by: EMERGENCY MEDICINE

## 2021-04-29 PROCEDURE — U0003 INFECTIOUS AGENT DETECTION BY NUCLEIC ACID (DNA OR RNA); SEVERE ACUTE RESPIRATORY SYNDROME CORONAVIRUS 2 (SARS-COV-2) (CORONAVIRUS DISEASE [COVID-19]), AMPLIFIED PROBE TECHNIQUE, MAKING USE OF HIGH THROUGHPUT TECHNOLOGIES AS DESCRIBED BY CMS-2020-01-R: HCPCS | Performed by: EMERGENCY MEDICINE

## 2021-04-29 PROCEDURE — 250N000013 HC RX MED GY IP 250 OP 250 PS 637: Performed by: EMERGENCY MEDICINE

## 2021-04-29 PROCEDURE — 85610 PROTHROMBIN TIME: CPT | Performed by: EMERGENCY MEDICINE

## 2021-04-29 PROCEDURE — 70498 CT ANGIOGRAPHY NECK: CPT

## 2021-04-29 PROCEDURE — 80048 BASIC METABOLIC PNL TOTAL CA: CPT | Performed by: EMERGENCY MEDICINE

## 2021-04-29 PROCEDURE — 70450 CT HEAD/BRAIN W/O DYE: CPT | Mod: XU

## 2021-04-29 PROCEDURE — 99285 EMERGENCY DEPT VISIT HI MDM: CPT | Mod: 25

## 2021-04-29 PROCEDURE — 93005 ELECTROCARDIOGRAM TRACING: CPT

## 2021-04-29 PROCEDURE — C9803 HOPD COVID-19 SPEC COLLECT: HCPCS

## 2021-04-29 PROCEDURE — 84443 ASSAY THYROID STIM HORMONE: CPT | Performed by: EMERGENCY MEDICINE

## 2021-04-29 PROCEDURE — U0005 INFEC AGEN DETEC AMPLI PROBE: HCPCS | Performed by: EMERGENCY MEDICINE

## 2021-04-29 RX ORDER — CLOPIDOGREL BISULFATE 75 MG/1
300 TABLET ORAL ONCE
Status: COMPLETED | OUTPATIENT
Start: 2021-04-29 | End: 2021-04-29

## 2021-04-29 RX ORDER — ASPIRIN 325 MG
325 TABLET ORAL ONCE
Status: COMPLETED | OUTPATIENT
Start: 2021-04-29 | End: 2021-04-29

## 2021-04-29 RX ORDER — IOPAMIDOL 755 MG/ML
500 INJECTION, SOLUTION INTRAVASCULAR ONCE
Status: COMPLETED | OUTPATIENT
Start: 2021-04-29 | End: 2021-04-29

## 2021-04-29 RX ADMIN — ASPIRIN 325 MG ORAL TABLET 325 MG: 325 PILL ORAL at 21:19

## 2021-04-29 RX ADMIN — CLOPIDOGREL BISULFATE 300 MG: 75 TABLET ORAL at 21:19

## 2021-04-29 RX ADMIN — IOPAMIDOL 70 ML: 755 INJECTION, SOLUTION INTRAVENOUS at 19:12

## 2021-04-29 ASSESSMENT — VISUAL ACUITY
OS: 20/30
OD: 20/30

## 2021-04-29 ASSESSMENT — ENCOUNTER SYMPTOMS
SHORTNESS OF BREATH: 0
NUMBNESS: 1

## 2021-04-29 NOTE — ED TRIAGE NOTES
Pt states he was doing housework and afterwards developed tingling in his head, hands and vision issues out of his right eye at 5pm. States he was only seeing blueish/purple haze. Denies headache. ABC's intact, alert and oriented X3.

## 2021-04-29 NOTE — TELEPHONE ENCOUNTER
Pt wife called in states Pt has blurred vision.  Pt is not able to see anything form his right eye.  The Pt had numbness on his head 15 min ago.  Pt is not able to see clear at this time.  No weakness on his arm or legs at this time.  Pt /89 P 94.  Pt is taking BP medication.  The disposition is to be seen at the ED.  Care advice given per protocol.  Patient agrees with care advice given.   Agreed to call back if he has additional symptoms or questions.      Arun Manning Maceo Nurse Advisor 4/29/2021 4:57 PM      Reason for Disposition    Complete loss of vision in 1 or both eyes    Additional Information    Negative: Weakness of the face, arm or leg on one side of the body    Negative: Followed getting substance in the eye    Negative: Foreign body or object is or was lodged in the eye    Negative: Followed an eye injury    Negative: Followed sun lamp or sun exposure (UV keratitis)    Negative: Yellow or green discharge (pus) in the eye    Negative: Pregnant    Negative: Postpartum (from 0 to 6 weeks after delivery)    Protocols used: VISION LOSS OR CHANGE-A-AH

## 2021-04-29 NOTE — CONSULTS
[facility]    Stroke Telephone Note    I was called by Duke Patel on 04/29/21 regarding patient Otf Bolaños. The patient is a 61 year old male with hx of HTN COPD presents with vision changes out of R eye which lasted for 20-30 minutes. Patient noticed on standing up that vision out of R eye was completely turned blue. He also noticed left hand tingling and numbness.     Thrombolytic Treatment   Not given due to minor/isolated/quickly resolving symptoms.    Endovascular Treatment  Not initiated due to absence of proximal vessel occlusion    Impression  Transient Ischemic Attack     Recommendations   Obtain CT and CTA head and neck.   Once imaging completed please page stroke team for review of imaging and further recommendations.     9:08 PM  CTA showed   1.  Occlusion of the right internal carotid artery at the skull base with reconstitution of this vessel presumably via an intact [place]. There is evidence for asymmetrically reduced flow within the supraclinoid right ICA segment and proximal   right anterior circulation branches.  2.  No additional proximal branch occlusion. Specifically, the right ophthalmic artery remains patent.       Occlusion of the right internal carotid artery just beyond its origin. This is new compared to a neck CT from 09/24/2015 and may be acute to subacute in nature.    Load with aspirin 325 mg x1  And plavix 300 mg x1  Start aspirin 81 mg and plavix 75 mg daily tomorrow  Transfer to [facility] for possible vascular surgery evaluation  High intensity statin with target LDL <70        My recommendations are based on the information provided over the phone by Otf SOUSA Shonaсветлана's in-person providers. They are not intended to replace the clinical judgment of his in-person providers. I was not requested to personally see or examine the patient at this time.    Carolin Poole MD  Vascular Neurology  To page me or covering stroke neurology team member,  "click here: AMCOM   Choose \"On Call\" tab at top, then search dropdown box for \"Neurology Adult\", select location, press Enter, then look for stroke/neuro ICU/telestroke.           "

## 2021-04-30 ENCOUNTER — HOSPITAL ENCOUNTER (INPATIENT)
Facility: CLINIC | Age: 61
LOS: 1 days | Discharge: HOME | DRG: 068 | End: 2021-05-01
Attending: STUDENT IN AN ORGANIZED HEALTH CARE EDUCATION/TRAINING PROGRAM | Admitting: STUDENT IN AN ORGANIZED HEALTH CARE EDUCATION/TRAINING PROGRAM
Payer: COMMERCIAL

## 2021-04-30 ENCOUNTER — APPOINTMENT (OUTPATIENT)
Dept: MRI IMAGING | Facility: CLINIC | Age: 61
DRG: 068 | End: 2021-04-30
Attending: PHYSICIAN ASSISTANT
Payer: COMMERCIAL

## 2021-04-30 ENCOUNTER — APPOINTMENT (OUTPATIENT)
Dept: ULTRASOUND IMAGING | Facility: CLINIC | Age: 61
DRG: 068 | End: 2021-04-30
Attending: PHYSICIAN ASSISTANT
Payer: COMMERCIAL

## 2021-04-30 ENCOUNTER — APPOINTMENT (OUTPATIENT)
Dept: CARDIOLOGY | Facility: CLINIC | Age: 61
DRG: 068 | End: 2021-04-30
Attending: PHYSICIAN ASSISTANT
Payer: COMMERCIAL

## 2021-04-30 VITALS
DIASTOLIC BLOOD PRESSURE: 67 MMHG | HEART RATE: 90 BPM | BODY MASS INDEX: 36.74 KG/M2 | TEMPERATURE: 97.8 F | SYSTOLIC BLOOD PRESSURE: 136 MMHG | WEIGHT: 224.21 LBS | RESPIRATION RATE: 19 BRPM | OXYGEN SATURATION: 95 %

## 2021-04-30 DIAGNOSIS — G45.9 TIA (TRANSIENT ISCHEMIC ATTACK): Primary | ICD-10-CM

## 2021-04-30 DIAGNOSIS — I65.21 CAROTID OCCLUSION, RIGHT: ICD-10-CM

## 2021-04-30 PROBLEM — E88.2 LIPOMATOSIS: Status: ACTIVE | Noted: 2018-08-31

## 2021-04-30 PROBLEM — E66.9 OBESITY WITH BODY MASS INDEX 30 OR GREATER: Status: ACTIVE | Noted: 2019-04-23

## 2021-04-30 PROBLEM — I65.29 CAROTID ARTERY OCCLUSION: Status: ACTIVE | Noted: 2021-04-30

## 2021-04-30 PROBLEM — H53.9 VISION CHANGES: Status: ACTIVE | Noted: 2021-04-30

## 2021-04-30 LAB
ANION GAP SERPL CALCULATED.3IONS-SCNC: 1 MMOL/L (ref 3–14)
BUN SERPL-MCNC: 13 MG/DL (ref 7–30)
CALCIUM SERPL-MCNC: 8.6 MG/DL (ref 8.5–10.1)
CHLORIDE SERPL-SCNC: 105 MMOL/L (ref 94–109)
CHOLEST SERPL-MCNC: 155 MG/DL
CO2 SERPL-SCNC: 31 MMOL/L (ref 20–32)
CREAT SERPL-MCNC: 0.91 MG/DL (ref 0.66–1.25)
ERYTHROCYTE [DISTWIDTH] IN BLOOD BY AUTOMATED COUNT: 12.8 % (ref 10–15)
GFR SERPL CREATININE-BSD FRML MDRD: 89 ML/MIN/{1.73_M2}
GLUCOSE SERPL-MCNC: 102 MG/DL (ref 70–99)
HBA1C MFR BLD: 5.8 % (ref 0–5.6)
HCT VFR BLD AUTO: 45.6 % (ref 40–53)
HDLC SERPL-MCNC: 46 MG/DL
HGB BLD-MCNC: 14.7 G/DL (ref 13.3–17.7)
INR PPP: 1.09 (ref 0.86–1.14)
INTERPRETATION ECG - MUSE: NORMAL
LDLC SERPL CALC-MCNC: 85 MG/DL
MCH RBC QN AUTO: 29.4 PG (ref 26.5–33)
MCHC RBC AUTO-ENTMCNC: 32.2 G/DL (ref 31.5–36.5)
MCV RBC AUTO: 91 FL (ref 78–100)
NONHDLC SERPL-MCNC: 109 MG/DL
PLATELET # BLD AUTO: 142 10E9/L (ref 150–450)
POTASSIUM SERPL-SCNC: 4 MMOL/L (ref 3.4–5.3)
RBC # BLD AUTO: 5 10E12/L (ref 4.4–5.9)
SODIUM SERPL-SCNC: 137 MMOL/L (ref 133–144)
TRIGL SERPL-MCNC: 121 MG/DL
WBC # BLD AUTO: 9.5 10E9/L (ref 4–11)

## 2021-04-30 PROCEDURE — 99221 1ST HOSP IP/OBS SF/LOW 40: CPT | Performed by: PSYCHIATRY & NEUROLOGY

## 2021-04-30 PROCEDURE — 36415 COLL VENOUS BLD VENIPUNCTURE: CPT | Performed by: STUDENT IN AN ORGANIZED HEALTH CARE EDUCATION/TRAINING PROGRAM

## 2021-04-30 PROCEDURE — 255N000002 HC RX 255 OP 636: Performed by: STUDENT IN AN ORGANIZED HEALTH CARE EDUCATION/TRAINING PROGRAM

## 2021-04-30 PROCEDURE — A9585 GADOBUTROL INJECTION: HCPCS | Performed by: STUDENT IN AN ORGANIZED HEALTH CARE EDUCATION/TRAINING PROGRAM

## 2021-04-30 PROCEDURE — 250N000013 HC RX MED GY IP 250 OP 250 PS 637: Performed by: PHYSICIAN ASSISTANT

## 2021-04-30 PROCEDURE — 85027 COMPLETE CBC AUTOMATED: CPT | Performed by: STUDENT IN AN ORGANIZED HEALTH CARE EDUCATION/TRAINING PROGRAM

## 2021-04-30 PROCEDURE — 80048 BASIC METABOLIC PNL TOTAL CA: CPT | Performed by: STUDENT IN AN ORGANIZED HEALTH CARE EDUCATION/TRAINING PROGRAM

## 2021-04-30 PROCEDURE — 93306 TTE W/DOPPLER COMPLETE: CPT | Mod: 26 | Performed by: INTERNAL MEDICINE

## 2021-04-30 PROCEDURE — 80061 LIPID PANEL: CPT | Performed by: STUDENT IN AN ORGANIZED HEALTH CARE EDUCATION/TRAINING PROGRAM

## 2021-04-30 PROCEDURE — 250N000011 HC RX IP 250 OP 636: Performed by: INTERNAL MEDICINE

## 2021-04-30 PROCEDURE — 99223 1ST HOSP IP/OBS HIGH 75: CPT | Mod: AI | Performed by: STUDENT IN AN ORGANIZED HEALTH CARE EDUCATION/TRAINING PROGRAM

## 2021-04-30 PROCEDURE — 70553 MRI BRAIN STEM W/O & W/DYE: CPT

## 2021-04-30 PROCEDURE — 85610 PROTHROMBIN TIME: CPT | Performed by: STUDENT IN AN ORGANIZED HEALTH CARE EDUCATION/TRAINING PROGRAM

## 2021-04-30 PROCEDURE — 120N000001 HC R&B MED SURG/OB

## 2021-04-30 PROCEDURE — 250N000013 HC RX MED GY IP 250 OP 250 PS 637: Performed by: STUDENT IN AN ORGANIZED HEALTH CARE EDUCATION/TRAINING PROGRAM

## 2021-04-30 PROCEDURE — 93880 EXTRACRANIAL BILAT STUDY: CPT

## 2021-04-30 PROCEDURE — 999N000208 ECHOCARDIOGRAM COMPLETE

## 2021-04-30 PROCEDURE — 99222 1ST HOSP IP/OBS MODERATE 55: CPT | Performed by: SURGERY

## 2021-04-30 PROCEDURE — 83036 HEMOGLOBIN GLYCOSYLATED A1C: CPT | Performed by: STUDENT IN AN ORGANIZED HEALTH CARE EDUCATION/TRAINING PROGRAM

## 2021-04-30 PROCEDURE — 99207 PR APP CREDIT; MD BILLING SHARED VISIT: CPT | Performed by: INTERNAL MEDICINE

## 2021-04-30 PROCEDURE — 258N000003 HC RX IP 258 OP 636: Performed by: STUDENT IN AN ORGANIZED HEALTH CARE EDUCATION/TRAINING PROGRAM

## 2021-04-30 RX ORDER — ATORVASTATIN CALCIUM 10 MG/1
10 TABLET, FILM COATED ORAL EVERY EVENING
Status: DISCONTINUED | OUTPATIENT
Start: 2021-04-30 | End: 2021-04-30

## 2021-04-30 RX ORDER — MULTIVITAMIN,THERAPEUTIC
1 TABLET ORAL DAILY
COMMUNITY

## 2021-04-30 RX ORDER — LORAZEPAM 2 MG/ML
1 INJECTION INTRAMUSCULAR ONCE
Status: COMPLETED | OUTPATIENT
Start: 2021-04-30 | End: 2021-04-30

## 2021-04-30 RX ORDER — ACETAMINOPHEN 325 MG/1
325-650 TABLET ORAL EVERY 6 HOURS PRN
Status: ON HOLD | COMMUNITY
End: 2021-05-01

## 2021-04-30 RX ORDER — GADOBUTROL 604.72 MG/ML
10 INJECTION INTRAVENOUS ONCE
Status: COMPLETED | OUTPATIENT
Start: 2021-04-30 | End: 2021-04-30

## 2021-04-30 RX ORDER — SODIUM CHLORIDE, SODIUM LACTATE, POTASSIUM CHLORIDE, CALCIUM CHLORIDE 600; 310; 30; 20 MG/100ML; MG/100ML; MG/100ML; MG/100ML
INJECTION, SOLUTION INTRAVENOUS CONTINUOUS
Status: DISCONTINUED | OUTPATIENT
Start: 2021-04-30 | End: 2021-04-30

## 2021-04-30 RX ORDER — ASPIRIN 81 MG/1
81 TABLET ORAL DAILY
Status: DISCONTINUED | OUTPATIENT
Start: 2021-04-30 | End: 2021-05-01 | Stop reason: HOSPADM

## 2021-04-30 RX ORDER — ACETAMINOPHEN 325 MG/1
650 TABLET ORAL EVERY 4 HOURS PRN
Status: DISCONTINUED | OUTPATIENT
Start: 2021-04-30 | End: 2021-05-01 | Stop reason: HOSPADM

## 2021-04-30 RX ORDER — ONDANSETRON 2 MG/ML
4 INJECTION INTRAMUSCULAR; INTRAVENOUS EVERY 6 HOURS PRN
Status: DISCONTINUED | OUTPATIENT
Start: 2021-04-30 | End: 2021-05-01 | Stop reason: HOSPADM

## 2021-04-30 RX ORDER — LIDOCAINE 40 MG/G
CREAM TOPICAL
Status: DISCONTINUED | OUTPATIENT
Start: 2021-04-30 | End: 2021-05-01 | Stop reason: HOSPADM

## 2021-04-30 RX ORDER — CLOPIDOGREL BISULFATE 75 MG/1
75 TABLET ORAL DAILY
Status: DISCONTINUED | OUTPATIENT
Start: 2021-04-30 | End: 2021-05-01 | Stop reason: HOSPADM

## 2021-04-30 RX ORDER — ONDANSETRON 4 MG/1
4 TABLET, ORALLY DISINTEGRATING ORAL EVERY 6 HOURS PRN
Status: DISCONTINUED | OUTPATIENT
Start: 2021-04-30 | End: 2021-05-01 | Stop reason: HOSPADM

## 2021-04-30 RX ORDER — ATORVASTATIN CALCIUM 40 MG/1
40 TABLET, FILM COATED ORAL EVERY EVENING
Status: DISCONTINUED | OUTPATIENT
Start: 2021-04-30 | End: 2021-05-01 | Stop reason: HOSPADM

## 2021-04-30 RX ORDER — LISINOPRIL 10 MG/1
10 TABLET ORAL DAILY
Status: DISCONTINUED | OUTPATIENT
Start: 2021-04-30 | End: 2021-05-01 | Stop reason: HOSPADM

## 2021-04-30 RX ORDER — IBUPROFEN 200 MG
200-400 TABLET ORAL EVERY 4 HOURS PRN
COMMUNITY
End: 2022-01-24

## 2021-04-30 RX ADMIN — LORAZEPAM 1 MG: 2 INJECTION INTRAMUSCULAR; INTRAVENOUS at 15:39

## 2021-04-30 RX ADMIN — ATORVASTATIN CALCIUM 40 MG: 40 TABLET, FILM COATED ORAL at 19:56

## 2021-04-30 RX ADMIN — ASPIRIN 81 MG: 81 TABLET, DELAYED RELEASE ORAL at 09:43

## 2021-04-30 RX ADMIN — SODIUM CHLORIDE, POTASSIUM CHLORIDE, SODIUM LACTATE AND CALCIUM CHLORIDE: 600; 310; 30; 20 INJECTION, SOLUTION INTRAVENOUS at 05:54

## 2021-04-30 RX ADMIN — HUMAN ALBUMIN MICROSPHERES AND PERFLUTREN 3 ML: 10; .22 INJECTION, SOLUTION INTRAVENOUS at 15:29

## 2021-04-30 RX ADMIN — LISINOPRIL 10 MG: 10 TABLET ORAL at 08:44

## 2021-04-30 RX ADMIN — CLOPIDOGREL BISULFATE 75 MG: 75 TABLET ORAL at 09:43

## 2021-04-30 RX ADMIN — GADOBUTROL 10 ML: 604.72 INJECTION INTRAVENOUS at 17:08

## 2021-04-30 ASSESSMENT — ACTIVITIES OF DAILY LIVING (ADL)
ADLS_ACUITY_SCORE: 10

## 2021-04-30 NOTE — DISCHARGE SUMMARY
[facility]    Discharge Summary  Hospitalist    Date of Admission:  2021  Date of Discharge:  2021  Discharging Provider: Rene Nath MD    Discharge Diagnoses   Principal Problem:    Acute Right Internal Carotid Occlusion   Active Problems:    COPD (chronic obstructive pulmonary disease) (H)    Benign essential hypertension    Pulmonary hypertension (H)    Vision changes -- Possible Right Amaurosis Fugax       History of Present Illness   61 year old male with past medical history of COPD, hypertension, pulmonary hypertension who presents for evaluation of an eye problem.     Patient ports that he was in his usual state of health until the day of admission when he was running errands around town and after getting home to put away his groceries intervals, he noticed onset of a tingling sensation in his bilateral hands and significantly darkened vision in his right eye.  He reports that symptoms lasted roughly 30 minutes before they resolved on their own.  Ultimately given his concern, he had his wife drive him to the emergency department at [facility] to seek further evaluation.  He denied any chest pain or shortness of breath.  He denies any prior history of strokes.  He denies any personal history of coronary disease, he reports that his father  from a massive MI.  He denies any history of atrial fibrillation, he is on anticoagulation.  He is not a diabetic.    Hospital Course   Admitted to medicine floor, neurology consulted.  Head CT showed no stroke, CTA of head and neck showed occluded right ICA.  US carotid confirms R ICA occlusion.  Vascular surgery consulted and advised no surgical intervention.     Aspirin 325 mg and plavix 75 mg daily x 21 days. Then aspirin 325 mg daily thereafter  Lipitor 40 mg for LDL goal <70.     Rene Nath MD  Pager: [phone]  Cell Phone:  [phone]       Significant Results and Procedures   As  above    Pending Results   These results will be followed up by Dr. Monreal  Unresulted Labs Ordered in the Past 30 Days of this Admission     No orders found from 3/31/2021 to 5/1/2021.          Code Status   Full Code       Primary Care Physician   Brian Mckinley    Physical Exam   Temp: 97.9  F (36.6  C) Temp src: Oral BP: (!) 140/63 Pulse: 75   Resp: 16 SpO2: 95 % O2 Device: None (Room air) Oxygen Delivery: 2 LPM  There were no vitals filed for this visit.  Vital Signs with Ranges  Temp:  [97.6  F (36.4  C)-97.9  F (36.6  C)] 97.9  F (36.6  C)  Pulse:  [] 75  Resp:  [14-32] 16  BP: (113-158)/(63-90) 140/63  SpO2:  [93 %-98 %] 95 %  No intake/output data recorded.    Exam on discharge:   Lungs clear  CV with reg S1S2  Neuro -- alert, Ox3, no focal deficits    Discharge Disposition   Discharged to home  Condition at discharge: Good    Consultations This Hospital Stay   VASCULAR SURGERY IP CONSULT    Time Spent on this Encounter   I spent a total of 35 minutes discharging this patient.     Discharge Orders   No discharge procedures on file.  Discharge Medications   Current Discharge Medication List      CONTINUE these medications which have NOT CHANGED    Details   albuterol (PROAIR HFA/PROVENTIL HFA/VENTOLIN HFA) 108 (90 Base) MCG/ACT inhaler Inhale 2 puffs into the lungs every 6 hours  Qty: 1 Inhaler, Refills: 3    Comments: Pharmacy may dispense brand covered by insurance (Proair, or proventil or ventolin or generic albuterol inhaler)  Associated Diagnoses: Pulmonary hypertension (H); Chronic obstructive pulmonary disease, unspecified COPD type (H)      aspirin 81 MG tablet Take 81 mg by mouth daily      lisinopril (ZESTRIL) 10 MG tablet Take one tablet (10mg) by mouth once daily  Qty: 90 tablet, Refills: 1    Associated Diagnoses: Benign essential hypertension      sildenafil (REVATIO) 20 MG tablet Take 2-5 tablets (40-100mg) 30 minutes to 4 hours before sex  Qty: 30 tablet, Refills: 0    Associated  Diagnoses: Erectile dysfunction, unspecified erectile dysfunction type           Allergies   Allergies   Allergen Reactions     Bee Venom      Edema at the site of the sting     Hydrochlorothiazide      Low potassium       Data   Most Recent 3 CBC's:  Recent Labs   Lab Test 04/30/21  0743 04/29/21  1758 02/17/14  0510   WBC 9.5 12.5* 4.4   HGB 14.7 15.3 8.6*   MCV 91 92 95   * 154 246      Most Recent 3 BMP's:  Recent Labs   Lab Test 04/30/21  0743 04/29/21  1758 02/01/21  0809    137 136   POTASSIUM 4.0 4.1 4.6   CHLORIDE 105 105 103   CO2 31 32 32   BUN 13 14 18   CR 0.91 0.90 1.06   ANIONGAP 1* <1* 1*   PABLO 8.6 9.0 10.1   * 90 106*     Most Recent 2 LFT's:  Recent Labs   Lab Test 02/01/21  0809 12/07/15 02/17/14  0510   AST 19 24 30   ALT 29  --  56   ALKPHOS 72  --  112   BILITOTAL 0.7  --  0.4     Most Recent INR's and Anticoagulation Dosing History:  Anticoagulation Dose History     Recent Dosing and Labs Latest Ref Rng & Units 1/23/2014 2/10/2014 2/17/2014 4/29/2021 4/30/2021    INR 0.86 - 1.14 0.98 1.08 1.11 0.99 1.09        Most Recent 3 Troponin's:  Recent Labs   Lab Test 04/29/21  1758 01/27/14  0015 01/26/14  1915   TROPI <0.015 <0.012 <0.012     Most Recent Cholesterol Panel:  Recent Labs   Lab Test 04/30/21  0743   CHOL 155   LDL 85   HDL 46   TRIG 121     Most Recent 6 Bacteria Isolates From Any Culture (See EPIC Reports for Culture Details):  Recent Labs   Lab Test 02/13/14  1015 02/11/14  1428 02/10/14  1245 02/10/14  1010 02/10/14  0950 02/10/14  0940   CULT No growth No growth Moderate growth Coagulase negative Staphylococcus Susceptibility testing not  routinely done Moderate growth Candida albicans / dubliniensis Candida albicans and Candida  dubliniensis are not routinely speciated Susceptibility testing not routinely  done* No growth >100,000 colonies/mL Enterococcus species (VRE) Further speciated as: Enterococcus faecium Critical Value called to and read back by Radha  Genesis MEAD @ 0148 on 2.12.14  KJR* Cultured on the 1st day of incubation: Coagulase negative Staphylococcus Critical Value, preliminary result only, called to and read back by China Raines RN ([facility]) @19:35 on 2/11/14,KO*     Most Recent TSH, T4 and A1c Labs:  Recent Labs   Lab Test 04/30/21  0743 04/29/21  1758   TSH  --  1.18   A1C 5.8*  --

## 2021-04-30 NOTE — CONSULTS
"Vascular Neurology  and Neuro-critical Care Attending Attestation    In summary, Otf Bolaños is a 61 year old male with transient R eye vision changes with LONA occlusion.       US carotid confirms R ICA occlusion  Aspirin 325 mg and plavix 75 mg daily x 21 days. Then aspirin 325 mg daily thereafter  Lipitor 40 mg for LDL goal <70  Vascular surgery consulted and appreciate their recs.     I saw the Otf Bolaños on 04/30/21  with the SAMANTHA.  I reviewed all laboratory studies and neuroimaging.  I discussed the plan with the SAMANTHA and agree with their note.    I have personally spent a total of 35 minutes consulting with his medical providers and assessing the patient today, with more than 50% of this time spent in consultation, coordination of care, and discussion with the patient and/or family regarding diagnostic results, prognosis, symptom management, risks and benefits of management options, and development of the plan of care of above.      Carolin Poole MD   Department of Neurology  Division of Stroke and Neuro-critical care.     To page me or covering stroke neurology team member, click here: AMCOM   Choose \"On Call\" tab at top, then search dropdown box for \"Neurology Adult\", select location, press Enter, then look for stroke/neuro ICU/telestroke.    ___________________              [facility]    Stroke Consult Note    Reason for Consult:  \"TIA, R ica occluded\"    Chief Complaint: No chief complaint on file.       HPI  Otf Bolaños is a 61 year old male who presented to [facility] last evening because he reports that he was in his usual state of health, until he stood up and suddenly felt flushed, and lost vision in his R eye complete. It looked very very dark; anything that he could see looked like it was just illuminated slightly with a UV light. He had some bilateral hand tingling at the time, but no focal weakness.      He was found to have a R ICA occlusion during " "workup in the [facility] ED. He reports he never had any stroke symptoms in the past including L side numbness or weakness ever before in his life.    He is a former smoker. He reports he has COPD and pulmonary HTN. He also has had extensive neck plastic surgery at [facility] for lipomatosis and lipodystrophy.    He has been seen by vascular surgery and was told no intervention due to complete occlusion.    TIA Evaluation summarized:  MRI/Head CT: ordered*  Intracranial Vascular Imaging: CTA head shows patent R ophthalmic artery. R ICA occlusion  Cervical Carotid and Vertebral Artery Vascular Imaging: CTA neck shows R ICA occlusion, new compared to 2015  Echocardiogram: ordered*  EKG/Telemetry: SR with short MS  LDL: 85  A1c: 5.8  Troponin: <0.015   Other testing: Carotid duplex ordered*        Impression  1. Transient Ischemic Attack (R eye visual loss) in the setting of:  2. R ICA occlusion, probably chronic    Recommendations  - Check MRI, echo  - Check carotid duplex to confirm occlusion of R ICA versus perhaps near-occlusion  - Continue permissive HTN for now, goal <180 ok  - Start lipitor 40mg daily for goal LDL 40-70  - Continue Plavix 75 mg and aspirin 81mg daily together for 21 days; then aspirin 325mg alone  - [facility] stroke education consult  - KAYLIE eval as outpatient   - Repeat CTA head/neck as outpatient in 6-8 weeks       Patient Follow-up    - final recommendation pending work-up    Thank you for this consult. We will continue to follow.     Ethel Hughes PA-C  Neurology  04/30/2021 10:07 AM  To page me or covering stroke neurology team member, click here: AMCOM   Choose \"On Call\" tab at top, then search dropdown box for \"Neurology Adult\", select location, press Enter, then look for stroke/neuro ICU/telestroke.    _____________________________________________________    Past Medical History   Past Medical History:   Diagnosis Date     Collapse of left lung 2014     ETOH abuse 1/24/2014     Hypertension      " Hypokalemia 2014     Low back pain      Mild malnutrition (H) 2014     Multiple symmetrical lipomatosis 2015     Thrombocytopaenia      Tobacco abuse 2014     Varicose vein      VRE (vancomycin-resistant Enterococci) 2/10/14    Urine     Past Surgical History   Past Surgical History:   Procedure Laterality Date     TRACHEOSTOMY  2014    Procedure: TRACHEOSTOMY;  TRACHEOSTOMY;  Surgeon: Myles Treadwell MD;  Location:  OR     Medications   Home Meds  Medication Sig   albuterol (PROAIR HFA/PROVENTIL HFA/VENTOLIN HFA) 108 (90 Base) MCG/ACT inhaler Inhale 2 puffs into the lungs every 6 hours   aspirin 81 MG tablet Take 81 mg by mouth daily   lisinopril (ZESTRIL) 10 MG tablet Take one tablet (10mg) by mouth once daily   sildenafil (REVATIO) 20 MG tablet Take 2-5 tablets (40-100mg) 30 minutes to 4 hours before sex       Scheduled Meds    aspirin  81 mg Oral Daily     atorvastatin  10 mg Oral QPM     clopidogrel  75 mg Oral Daily     lisinopril  10 mg Oral Daily     sodium chloride (PF)  3 mL Intracatheter Q8H       Infusion Meds    lactated ringers 100 mL/hr at 21 0846       PRN Meds  acetaminophen, lidocaine 4%, lidocaine (buffered or not buffered), melatonin, ondansetron **OR** ondansetron, sodium chloride (PF)    Allergies   Allergies   Allergen Reactions     Bee Venom      Edema at the site of the sting     Hydrochlorothiazide      Low potassium       Family History   Family History   Problem Relation Age of Onset     Hypertension Father      Myocardial Infarction Father      Cancer Paternal Grandfather      Social History   Social History     Tobacco Use     Smoking status: Former Smoker     Packs/day: 2.00     Years: 30.00     Pack years: 60.00     Quit date: 2014     Years since quittin.2     Smokeless tobacco: Never Used   Substance Use Topics     Alcohol use: Not Currently     Drug use: No       Review of Systems   The 5 point Review of Systems is negative other than  noted in the HPI or here.        PHYSICAL EXAMINATION   Temp:  [97.6  F (36.4  C)-97.9  F (36.6  C)] 97.9  F (36.6  C)  Pulse:  [] 86  Resp:  [14-32] 16  BP: (113-158)/(63-90) 141/70  SpO2:  [93 %-98 %] 95 %    General:  patient lying in bed without any acute distress    HEENT:  normocephalic/atraumatic    Neurologic  Mental Status:  alert, oriented x 3, follows commands, speech clear and fluent, naming and repetition normal  Cranial Nerves:  visual fields intact, PERRL, EOMI with normal smooth pursuit, facial sensation intact and symmetric, facial movements symmetric, hearing not formally tested but intact to conversation, palate elevation symmetric and uvula midline, no dysarthria, shoulder shrug strong bilaterally, tongue protrusion midline  Motor:  normal muscle tone and bulk, no abnormal movements, able to move all limbs spontaneously, strength 5/5 throughout upper and lower extremities, no pronator drift  Reflexes:  toes down-going  Sensory:  light touch sensation intact and symmetric throughout upper and lower extremities, no extinction on double simultaneous stimulation   Coordination:  normal finger-to-nose and heel-to-shin bilaterally without dysmetria, rapid alternating movements symmetric  Station/Gait:  deferred    Dysphagia Screen  Per Nursing    Stroke Scales    NIHSS  Interval     Interval Comments     1a. Level of Consciousness 0-->Alert, keenly responsive   1b. LOC Questions 0-->Answers both questions correctly   1c. LOC Commands 0-->Performs both tasks correctly   2.   Best Gaze 0-->Normal   3.   Visual 0-->No visual loss   4.   Facial Palsy 0-->Normal symmetrical movements   5a. Motor Arm, Left 0-->No drift, limb holds 90 (or 45) degrees for full 10 secs   5b. Motor Arm, Right 0-->No drift, limb holds 90 (or 45) degrees for full 10 secs   6a. Motor Leg, Left 0-->No drift, leg holds 30 degree position for full 5 secs   6b. Motor Leg, right 0-->No drift, leg holds 30 degree position for full  5 secs   7.   Limb Ataxia 0-->Absent   8.   Sensory 0-->Normal, no sensory loss   9.   Best Language 0-->No aphasia, normal   10. Dysarthria 0-->Normal   11. Extinction and Inattention  0-->No abnormality   Total 0 (04/30/21 0955)       Imaging  I personally reviewed all imaging; relevant findings per HPI.    Labs Data   CBC  Recent Labs   Lab 04/30/21  0743 04/29/21  1758   WBC 9.5 12.5*   RBC 5.00 5.15   HGB 14.7 15.3   HCT 45.6 47.1   * 154     Basic Metabolic Panel   Recent Labs   Lab 04/30/21  0743 04/29/21  1758    137   POTASSIUM 4.0 4.1   CHLORIDE 105 105   CO2 31 32   BUN 13 14   CR 0.91 0.90   * 90   PABLO 8.6 9.0     Liver Panel  No results for input(s): PROTTOTAL, ALBUMIN, BILITOTAL, ALKPHOS, AST, ALT, BILIDIRECT in the last 168 hours.  INR    Recent Labs   Lab Test 04/30/21  0743 04/29/21  1758 02/17/14  0510   INR 1.09 0.99 1.11      Lipid Profile    Recent Labs   Lab Test 04/30/21  0743 02/01/21  0809 02/11/19  0857 10/26/15  1045 10/26/15  1045 04/30/15  0857 12/31/13   CHOL 155 151 200*   < > 194 167 209*   HDL 46 51 55   < > 47 55 70   LDL 85 76 119*   < > 119 92 102   TRIG 121 118 131   < > 140 101 186   CHOLHDLRATIO  --   --   --   --  4.1 3.0 3.0    < > = values in this interval not displayed.     A1C    Recent Labs   Lab Test 10/26/15  1045   A1C 5.5     Troponin I    Recent Labs   Lab 04/29/21  1758   TROPI <0.015          Stroke Code / Stroke Consult Data Data This was a non-emergent, non-tele stroke consult.

## 2021-04-30 NOTE — H&P
[facility]    History and Physical - Hospitalist Service       Date of Admission:  4/30/2021    Assessment & Plan   Otf Bolaños is a 61 year old male admitted on 4/30/2021. He presents with vision changes.       Carotid artery occlusion    Assessment: Presents with symptoms of bilateral hand tingling and right sided visual deficits that lasted roughly 30 minutes before resolving spontaneously.  Head CTA shows Occlusion of the right internal carotid artery at the skull base with reconstitution of this vessel presumably via an intact [place]. There is evidence for asymmetrically reduced flow within the supraclinoid right ICA segment and proximal  right anterior circulation branches.  Stroke neuro was consulted, who recommended that the patient be evaluated by vascular surgery for significant occlusion of the right internal carotid artery.    Plan:   -Admit inpatient  -Neurochecks every 4 hours  -Aspirin 81 mg daily and Plavix 75 mg daily  -N.p.o.  -Medically cleared for surgery  -Follow vitals/temp  -Maintain normal BPs      COPD (chronic obstructive pulmonary disease) (H)    Assessment/Plan: Stable, no evidence of exacerbation, currently on room air.        Benign essential hypertension    Pulmonary hypertension (H)    Assessment/Plan: Continue prior to admission lisinopril       Diet: NPO for Medical/Clinical Reasons Except for: Ice Chips, Meds  DVT Prophylaxis: DAPT  Su Catheter: not present  Code Status: Full Code       Disposition Plan   Expected discharge: 2 - 3 days, recommended to prior living arrangement once vascular surgery work up completed.  Entered: Kevin Tripp MD 04/30/2021, 2:42 AM     The patient's care was discussed with the Patient and ED Provider.    Kevin Tripp MD  [facility]  Contact information available via [facility] Paging/Directory      ______________________________________________________________________    Chief Complaint      Eye problem    History is obtained from the patient    History of Present Illness     Otf Bolaños is a 61 year old male with past medical history of COPD, hypertension, pulmonary hypertension who presents for evaluation of an eye problem.    Patient ports that he was in his usual state of health until the day of admission when he was running errands around town and after getting home to put away his groceries intervals, he noticed onset of a tingling sensation in his bilateral hands and significantly darkened vision in his right eye.  He reports that symptoms lasted roughly 30 minutes before they resolved on their own.  Ultimately given his concern, he had his wife drive him to the emergency department at [facility] to seek further evaluation.  He denied any chest pain or shortness of breath.  He denies any prior history of strokes.  He denies any personal history of coronary disease, he reports that his father  from a massive MI.  He denies any history of atrial fibrillation, he is on anticoagulation.  He is not a diabetic.  He denies any recent fevers or chills, no falls or head trauma.  He denies any slurred speech, he does endorse intermittent headaches in the past.  He denies any history of seizures.  He denies any recent blood in stool, weight loss or night sweats, he has no PND/orthopnea.  He denies any loss of bowel or bladder function.  He denies any recent urinary urgency or frequency, no hematuria or dysuria.  At this time he is asymptomatic.    Review of Systems    The 10 point Review of Systems is negative other than noted in the HPI or here.     Past Medical History    I have reviewed this patient's medical history and updated it with pertinent information if needed.   Past Medical History:   Diagnosis Date     Collapse of left lung      ETOH abuse 2014     Hypertension      Hypokalemia 2014     Low back pain      Mild malnutrition (H) 2014     Multiple symmetrical  lipomatosis 2015     Thrombocytopaenia      Tobacco abuse 2014     Varicose vein      VRE (vancomycin-resistant Enterococci) 2/10/14    Urine       Past Surgical History   I have reviewed this patient's surgical history and updated it with pertinent information if needed.  Past Surgical History:   Procedure Laterality Date     TRACHEOSTOMY  2014    Procedure: TRACHEOSTOMY;  TRACHEOSTOMY;  Surgeon: Myles Treadwell MD;  Location:  OR       Social History   I have reviewed this patient's social history and updated it with pertinent information if needed.  Social History     Tobacco Use     Smoking status: Former Smoker     Packs/day: 2.00     Years: 30.00     Pack years: 60.00     Quit date: 2014     Years since quittin.2     Smokeless tobacco: Never Used   Substance Use Topics     Alcohol use: Not Currently     Drug use: No       Family History   I have reviewed this patient's family history and updated it with pertinent information if needed.  Family History   Problem Relation Age of Onset     Hypertension Father      Myocardial Infarction Father      Cancer Paternal Grandfather        Prior to Admission Medications   Prior to Admission Medications   Prescriptions Last Dose Informant Patient Reported? Taking?   albuterol (PROAIR HFA/PROVENTIL HFA/VENTOLIN HFA) 108 (90 Base) MCG/ACT inhaler   No No   Sig: Inhale 2 puffs into the lungs every 6 hours   aspirin 81 MG tablet   Yes No   Sig: Take 81 mg by mouth daily   lisinopril (ZESTRIL) 10 MG tablet   No No   Sig: Take one tablet (10mg) by mouth once daily   sildenafil (REVATIO) 20 MG tablet   No No   Sig: Take 2-5 tablets (40-100mg) 30 minutes to 4 hours before sex      Facility-Administered Medications: None     Allergies   Allergies   Allergen Reactions     Bee Venom      Edema at the site of the sting     Hydrochlorothiazide      Low potassium         Physical Exam   Vital Signs: Temp: 97.7  F (36.5  C) Temp src: Oral BP: (!) 144/78 Pulse:  97   Resp: 18 SpO2: 94 % O2 Device: None (Room air)    Weight: 0 lbs 0 oz    Constitutional: awake, alert, cooperative, no apparent distress.   Eyes: Lids and lashes normal, pupils equal, round and reactive to light   ENT: Normocephalic, without obvious abnormality, atraumatic, sinuses nontender on palpation   Hematologic / Lymphatic: no cervical lymphadenopathy   Respiratory: CTABL   Cardiovascular: RRR with no m/r/g   GI: Normal bowel sounds, soft, non-distended, non-tender. Skin: normal skin color, texture, turgor   Musculoskeletal: There is no redness, warmth, or swelling of the joints. Full range of motion noted.   Neurologic: Awake, alert, oriented to name, place and time. Cranial nerves II-XII are grossly intact. Motor is 5 out of 5 bilaterally. Sensory is intact.   Neuropsychiatric: normal mood and affect    Data   Data reviewed today: I reviewed all medications, new labs and imaging results over the last 24 hours. I personally reviewed the EKG tracing showing SR  and the head CT image(s) showing see below.    CTA Head Neck with Contrast  HEAD CTA:   1.  Occlusion of the right internal carotid artery at the skull base with reconstitution of this vessel presumably via an intact [place] of [place]. There is evidence for asymmetrically reduced flow within the supraclinoid right ICA segment and proximal   right anterior circulation branches.   2.  No additional proximal branch occlusion. Specifically, the right ophthalmic artery remains patent.   3.  No aneurysm or high flow vascular malformation.   Report per radiology     NECK CTA:   1.  Occlusion of the right internal carotid artery just beyond its origin. This is new compared to a neck CT from 09/24/2015 and may be acute to subacute in nature.   2.  No hemodynamically significant stenosis in the remaining cervical vessels.     Ct Head w/o Contrast   1.  No evidence of an acute intracranial abnormality.   2.  Mild atrophy.  Report per radiology     Most Recent  3 CBC's:  Recent Labs   Lab Test 04/29/21 1758 02/17/14  0510 02/13/14  0525   WBC 12.5* 4.4 4.4   HGB 15.3 8.6* 7.8*   MCV 92 95 96    246 207     Most Recent 3 BMP's:  Recent Labs   Lab Test 04/29/21 1758 02/01/21  0809 02/17/20  0814    136 137   POTASSIUM 4.1 4.6 4.1   CHLORIDE 105 103 105   CO2 32 32 28   BUN 14 18 18   CR 0.90 1.06 0.91   ANIONGAP <1* 1* 4   PABLO 9.0 10.1 9.1   GLC 90 106* 121*     Most Recent 2 LFT's:  Recent Labs   Lab Test 02/01/21  0809 12/07/15 02/17/14  0510   AST 19 24 30   ALT 29  --  56   ALKPHOS 72  --  112   BILITOTAL 0.7  --  0.4     Most Recent 3 INR's:  Recent Labs   Lab Test 04/29/21 1758 02/17/14  0510 02/10/14  0845   INR 0.99 1.11 1.08     Most Recent 3 Troponin's:  Recent Labs   Lab Test 04/29/21 1758 01/27/14  0015 01/26/14  1915   TROPI <0.015 <0.012 <0.012     Recent Results (from the past 24 hour(s))   CT Head w/o Contrast    Narrative    EXAM: CT HEAD W/O CONTRAST  LOCATION: [facility]  DATE/TIME: 4/29/2021 7:11 PM    INDICATION: Transient ischemic attack (TIA)  COMPARISON: None.  TECHNIQUE: Routine CT Head without IV contrast. Multiplanar reformats. Dose reduction techniques were used.    FINDINGS:  INTRACRANIAL CONTENTS: No intracranial hemorrhage, extraaxial collection, or mass effect.  No CT evidence of acute infarct. Unremarkable parenchymal attenuation. Mild generalized volume loss. No hydrocephalus. The sella is unremarkable for technique. The   cerebellar tonsils are appropriately positioned.     VISUALIZED ORBITS/SINUSES/MASTOIDS: Bilateral optic nerve drusen. No paranasal sinus mucosal disease. No middle ear or mastoid effusion.    BONES/SOFT TISSUES: No acute abnormality.      Impression    IMPRESSION:  1.  No evidence of an acute intracranial abnormality.  2.  Mild atrophy.   CTA Head Neck with Contrast    Narrative    EXAM: CTA  HEAD NECK WITH CONTRAST  LOCATION: [facility]  DATE/TIME: 4/29/2021 7:11  PM    INDICATION: Sudden onset transient right eye vision loss. Concern for Central retinal artery occlusion versus stroke/TIA  COMPARISON: CT head 04/29/2021. Soft tissue neck CT 09/24/2015.  CONTRAST: 70 mL [id number]  TECHNIQUE: Head and neck CT angiogram with IV contrast. Axial helical CT images of the head and neck vessels obtained during the arterial phase of intravenous contrast administration. Axial 2D reconstructed images and multiplanar 3D MIP reconstructed   images of the head and neck vessels were performed by the technologist. Dose reduction techniques were used. All stenosis measurements made according to NASCET criteria unless otherwise specified.    FINDINGS:     HEAD CTA:  ANTERIOR CIRCULATION: The vertical petrous right ICA segment is occluded. There is some reconstitution of enhancement within the horizontal petrous and with serum segments which are of small caliber. More robust flow within the cavernous and supraclinoid   right ICA; however, these vessels remain less robust than the left and are presumably filling through the intact [place] of [place]. The anterior and posterior communicating arteries are robust. No evidence for proximal branch occlusion, aneurysm, or high   flow vascular malformation. The right ophthalmic artery is patent.    POSTERIOR CIRCULATION: No stenosis/occlusion, aneurysm, or high flow vascular malformation. Balanced vertebral arteries supply a normal basilar artery.     DURAL VENOUS SINUSES: Expected enhancement of the major dural venous sinuses.    NECK CTA:  RIGHT CAROTID: Atherosclerotic changes of the right carotid bifurcation with complete occlusion of the right internal carotid artery at the level of the carotid bulb. This is new compared to the previous neck CT from 09/24/2015.    LEFT CAROTID: Minor atherosclerotic plaque the left carotid bifurcation without measurable stenosis or dissection.    VERTEBRAL ARTERIES: No focal stenosis or dissection. Balanced  vertebral arteries.    AORTIC ARCH: Classic aortic arch anatomy with no significant stenosis at the origin of the great vessels.    NONVASCULAR STRUCTURES: Moderate diffuse cervical spondylosis with reversal of the cervical lordosis and grade 1 anterolisthesis at C3. Moderate to high-grade multilevel neural foraminal stenosis suspected. Diffuse prominence of the subcutaneous adipose   tissues throughout the neck, upper back, and extending into the visualized superior mediastinum. Emphysema of the included lung apices.      Impression    IMPRESSION:     HEAD CTA:   1.  Occlusion of the right internal carotid artery at the skull base with reconstitution of this vessel presumably via an intact [place] of [place]. There is evidence for asymmetrically reduced flow within the supraclinoid right ICA segment and proximal   right anterior circulation branches.  2.  No additional proximal branch occlusion. Specifically, the right ophthalmic artery remains patent.  3.  No aneurysm or high flow vascular malformation.    NECK CTA:  1.  Occlusion of the right internal carotid artery just beyond its origin. This is new compared to a neck CT from 09/24/2015 and may be acute to subacute in nature.  2.  No hemodynamically significant stenosis in the remaining cervical vessels.

## 2021-04-30 NOTE — PLAN OF CARE
Summary:   TIA  Date/Time 4/30/21  [id number]    Service: Medical  Behavior/Aggression tool color: Green  Diagnosis: Partially occluded R carotid artery, with reduced flow proximal R ICA.  POD#:   Mental Status: A&Ox4  Activity/dangle: IND  Diet: NPO  Pain: denies  Su/Voiding: Voiding in BR  Tele/Restraints/Iso: SR. ISO for VRE  02/LDA: R PIV  D/C Date: Pending progress  Other Info: Neuros intact. Denies HA, visual changes. VSS 2L at bedtime per pt baseline. Vascular consult. Plan for R carotid enterectomy today.

## 2021-04-30 NOTE — PROVIDER NOTIFICATION
Dr. Monreal web-paged regarding if pt is ok to be off Telemetry while off unit and it pt ok to eat.       Addendum 1240:  Also web-paged about if pt can have prn med for anxiety as pt is claustrophobic and is to have MRI today.

## 2021-04-30 NOTE — PROGRESS NOTES
[facility]    Hospitalist Progress Note    Assessment & Plan   61 year old male with past medical history of COPD, hypertension, pulmonary hypertension who presents for evaluation of an eye problem:     Impression:   Principal Problem:    TIA (transient ischemic attack)  Active Problems:    COPD (chronic obstructive pulmonary disease) (H)    Benign essential hypertension    Pulmonary hypertension (H)    Acute Right Internal Carotid Occlusion     Vision changes -- Possible Right Amaurosis Fugax       Plan:  Vascular surgery consulted -- no surgery planned. Await neurology evaluation, brain MRI ordered.     DVT Prophylaxis: Pneumatic Compression Devices  Code Status: Full Code    Disposition: Expected discharge tomorrow    Rene Mcclellanfausto  Pager [phone]  Cell Phone [phone]  Text Page (7am to 6pm)    Interval History   Feels back to normal, currently no vision problems.     Physical Exam   Temp: 98  F (36.7  C) Temp src: Oral BP: 114/66 Pulse: 117   Resp: 16 SpO2: 91 % O2 Device: Nasal cannula Oxygen Delivery: 2 LPM  Vitals:    05/01/21 0521   Weight: 99.8 kg (220 lb)     Vital Signs with Ranges  Temp:  [97.5  F (36.4  C)-98  F (36.7  C)] 98  F (36.7  C)  Pulse:  [] 117  Resp:  [16] 16  BP: (114-141)/(63-71) 114/66  SpO2:  [91 %-95 %] 91 %  I/O last 3 completed shifts:  In: 240 [P.O.:240]  Out: -     # Pain Assessment:  Current Pain Score 5/1/2021   Patient currently in pain? sleeping, patient not able to self report   Pain score (0-10) -   Otf graves pain level was assessed and he currently denies pain.        Constitutional: Awake, alert, cooperative, no apparent distress  Respiratory: Clear to auscultation bilaterally, no crackles or wheezing  Cardiovascular: Regular rate and rhythm, normal S1 and S2, and no murmur noted  GI: Normal bowel sounds, soft, non-distended, non-tender  Extrem: No calf tenderness, no ankle edema  Neuro: Ox3, no focal motor or sensory  deficits    Medications       aspirin  81 mg Oral Daily     atorvastatin  40 mg Oral QPM     clopidogrel  75 mg Oral Daily     lisinopril  10 mg Oral Daily     sodium chloride (PF)  3 mL Intracatheter Q8H       Data   Recent Labs   Lab 21  0743 21  1758   WBC 9.5 12.5*   HGB 14.7 15.3   MCV 91 92   * 154   INR 1.09 0.99    137   POTASSIUM 4.0 4.1   CHLORIDE 105 105   CO2 31 32   BUN 13 14   CR 0.91 0.90   ANIONGAP 1* <1*   PABLO 8.6 9.0   * 90   TROPI  --  <0.015       Imaging:   Recent Results (from the past 24 hour(s))   US Carotid Bilateral    Narrative    BILATERAL CAROTID ULTRASOUND   2021 11:42 AM     HISTORY:  Confirm right ICA occlusion or not. Right internal carotid  artery occlusion seen on recent CTA.    COMPARISON: 2021    RIGHT CAROTID FINDINGS:  Occlusion of the right internal carotid  artery.  Right Vertebral: Antegrade flow.   Right ECA: Antegrade flow.     LEFT CAROTID FINDINGS:  Minimal plaque in the internal carotid artery.  Left ICA PSV:  103 cm/sec.  Left ICA EDV:  36 cm/sec.  Left ICA/CCA PSV Ratio:  1.0    These indicate less than 50% diameter stenosis of the left ICA.    Left Vertebral: Antegrade flow.   Left ECA: Antegrade flow.     Causes of Decreased Accuracy:   None.       Impression    IMPRESSION:    1. Occlusion of the right internal carotid artery, unchanged.   2. Less than 50% diameter stenosis of the left ICA relative to the  distal ICA diameter, unchanged.    ROME POWELL DO   Echocardiogram Complete    Narrative    [id number]  [id number]  [id number]  [id number]     [facility]  [facility]  Echocardiography Laboratory  [address]  [address]  [place] [address]  Phone [phone]  Fax [phone]     Name: DEAN HERNÁNDEZ  MRN: [id number]  : 1960  Study Date: 2021 02:51 PM  Age: 61 yrs  Gender: Male  Patient [facility]: [facility]  Reason For Study: CVA  Ordering  Physician: GURU MCNALLY  Referring Physician: DOMITILA PAIZ  Performed By: MARILEE Omer     BSA: 2.1 m2  Height: 65 in  Weight: 224 lb  HR: 92  ______________________________________________________________________________  Procedure  Complete Portable Echo Adult. Optison (NDC #6167-0216) given intravenously.  ______________________________________________________________________________  Interpretation Summary     Left ventricular systolic function is normal.  The visual ejection fraction is estimated at 55-60%.  No regional wall motion abnormalities noted.  The study was technically difficult. There is no comparison study available.  ______________________________________________________________________________  Left Ventricle  The left ventricle is normal in size. Left ventricular systolic function is  normal. The visual ejection fraction is estimated at 55-60%. Left ventricular  diastolic function is indeterminate. No regional wall motion abnormalities  noted.     Right Ventricle  The right ventricle is not well visualized. The right ventricular systolic  function is borderline reduced.     Atria  Normal left atrial size. Right atrial size is normal.     Mitral Valve  The mitral valve is not well visualized. Evaluation of regurgitation is  inadequate.     Tricuspid Valve  No tricuspid regurgitation. Right ventricular systolic pressure could not be  approximated due to inadequate tricuspid regurgitation.     Aortic Valve  The aortic valve is not well visualized. No aortic regurgitation is present.  No aortic stenosis is present.     Pulmonic Valve  The pulmonic valve is not well visualized.     Vessels  The aortic root is normal size.     Pericardium  There is no pericardial effusion.     Rhythm  Sinus rhythm was noted.     ______________________________________________________________________________  MMode/2D Measurements & Calculations  IVSd: 1.1 cm  LVIDd: 3.8 cm  LVIDs: 3.2 cm  LVPWd: 1.0 cm  FS: 16.2  %  LV mass(C)d: 124.9 grams  LV mass(C)dI: 60.2 grams/m2     Ao root diam: 3.5 cm  asc Aorta Diam: 3.3 cm  LVOT diam: 2.1 cm  LVOT area: 3.6 cm2  LA Volume (BP): 56.5 ml  LA Volume Index (BP): 27.2 ml/m2  RWT: 0.54     Doppler Measurements & Calculations  MV E max viky: 102.0 cm/sec  MV A max viky: 73.2 cm/sec  MV E/A: 1.4  MV dec time: 0.20 sec     PA acc time: 0.07 sec  E/E' av.9  Lateral E/e': 11.4  Medial E/e': 12.5     ______________________________________________________________________________  Report approved by: Aida Land 2021 05:10 PM         MR Brain w/o & w Contrast    Narrative    MRI BRAIN WITHOUT AND WITH CONTRAST  2021 5:15 PM     HISTORY:  Stroke, follow-up.    TECHNIQUE:  Multiplanar, multisequence MRI of the brain without and  with 10 mL Gadavist     COMPARISON: Head CT 2021.     FINDINGS: Images are degraded by motion artifact which limits  evaluation. No definite restricted diffusion to suggest infarct. No  mass effect or midline shift. No evidence of acute intracranial  hemorrhage. No parenchymal signal abnormality is appreciated.  Ventricular size is within normal limits without evidence of  hydrocephalus. No abnormal enhancement of the brain parenchyma.    Abnormal enhancement of the cavernous right carotid artery likely due  to slow flow/occlusion as was much better depicted by CTA.    The facial structures appear normal.       Impression    IMPRESSION:  Unremarkable brain MRI without evidence of acute infarct,  mass, hemorrhage, or herniation.      KIRIT MOTLEY MD

## 2021-04-30 NOTE — PHARMACY-ADMISSION MEDICATION HISTORY
Pharmacy Medication History  Admission medication history interview status for the 4/30/2021  admission is complete. See EPIC admission navigator for prior to admission medications     Location of Interview: Patient room  Medication history sources: Patient, pt's family    Significant changes made to the medication list:  Added ibuprofen, acetaminophen, multivitamin  Removed aspiri    In the past week, patient estimated taking medication this percent of the time: greater than 90%    Additional medication history information:   None    Medication reconciliation completed by provider prior to medication history? No    Time spent in this activity: 5 minutes    Prior to Admission medications    Medication Sig Last Dose Taking? Auth Provider   acetaminophen (TYLENOL) 325 MG tablet Take 325-650 mg by mouth every 6 hours as needed for mild pain  Yes Unknown, Entered By History   albuterol (PROAIR HFA/PROVENTIL HFA/VENTOLIN HFA) 108 (90 Base) MCG/ACT inhaler Inhale 2 puffs into the lungs every 6 hours  Yes Brian Mckinley PA-C   ibuprofen (ADVIL/MOTRIN) 200 MG tablet Take 200-400 mg by mouth every 4 hours as needed  Yes Unknown, Entered By History   lisinopril (ZESTRIL) 10 MG tablet Take one tablet (10mg) by mouth once daily 4/29/2021 at Unknown time Yes Brian Mckinley PA-C   multivitamin, therapeutic (THERA-VIT) TABS tablet Take 1 tablet by mouth daily Past Week at Unknown time Yes Unknown, Entered By History   sildenafil (REVATIO) 20 MG tablet Take 2-5 tablets (40-100mg) 30 minutes to 4 hours before sex   Brian Mckinley PA-C       The information provided in this note is only as accurate as the sources available at the time of update(s)

## 2021-04-30 NOTE — CONSULTS
[facility] PATIENT CONSULTATION NOTE  Consulted by: Dr Tripp  Reason for consultation: Right ICA occlusion    HPI:  Otf Bolaños is a 61 year old year old male who has a PMH significant for HTN, prior smoking history, COPD who presented with TIA symptoms including right vision loss and bilateral hand tingling that resolved after about 30 minutes.  No neurodeficits remain.      He was found to have occlusion of the right ICA from the origin to the skull base.  No other cervical stenoses.      Review Of Systems:   General: Denies F/C  Respiratory: Denies SOB  Cardio: Denies CP  Gastrointestinal: Denies N/V  Genitourinary: Denies recent change in urination  Musculoskeletal: See HPI  Neurologic: Denies HA  Psychiatric: Denies confusion  Hematology/immunology: no unexpected bruising    PAST MEDICAL HISTORY:  Past Medical History:   Diagnosis Date     Collapse of left lung      ETOH abuse 2014     Hypertension      Hypokalemia 2014     Low back pain      Mild malnutrition (H) 2014     Multiple symmetrical lipomatosis 2015     Thrombocytopaenia      Tobacco abuse 2014     Varicose vein      VRE (vancomycin-resistant Enterococci) 2/10/14    Urine       PAST SURGICAL HISTORY:  Past Surgical History:   Procedure Laterality Date     TRACHEOSTOMY  2014    Procedure: TRACHEOSTOMY;  TRACHEOSTOMY;  Surgeon: Myles Treadwell MD;  Location:  OR       FAMILY HISTORY:  Family History   Problem Relation Age of Onset     Hypertension Father      Myocardial Infarction Father      Cancer Paternal Grandfather        SOCIAL HISTORY:   Social History     Tobacco Use     Smoking status: Former Smoker     Packs/day: 2.00     Years: 30.00     Pack years: 60.00     Quit date: 2014     Years since quittin.2     Smokeless tobacco: Never Used   Substance Use Topics     Alcohol use: Not Currently       TOBACCO USE:  Quit several years ago, prior to that about 60 pack year  history    FUNCTIONAL CAPACITY:      HOME MEDICATIONS:  Prior to Admission medications    Medication Sig Start Date End Date Taking? Authorizing Provider   albuterol (PROAIR HFA/PROVENTIL HFA/VENTOLIN HFA) 108 (90 Base) MCG/ACT inhaler Inhale 2 puffs into the lungs every 6 hours 2/1/21   Brian Mckinley PA-C   aspirin 81 MG tablet Take 81 mg by mouth daily    Reported, Patient   lisinopril (ZESTRIL) 10 MG tablet Take one tablet (10mg) by mouth once daily 2/1/21   Brian Mckinley PA-C   sildenafil (REVATIO) 20 MG tablet Take 2-5 tablets (40-100mg) 30 minutes to 4 hours before sex 7/30/18   Brian Mckinley PA-C       VITAL SIGNS:  BP (!) 140/63 (BP Location: Right arm)   Pulse 75   Temp 97.9  F (36.6  C) (Oral)   Resp 16   SpO2 95%   No intake or output data in the 24 hours ending 04/30/21 0835    Labs:  ROUTINE IP LABS (Last four results)  BMP  Recent Labs   Lab 04/30/21  0743 04/29/21  1758    137   POTASSIUM 4.0 4.1   CHLORIDE 105 105   PABLO 8.6 9.0   CO2 31 32   BUN 13 14   CR 0.91 0.90   * 90     CBC  Recent Labs   Lab 04/30/21  0743 04/29/21  1758   WBC 9.5 12.5*   RBC 5.00 5.15   HGB 14.7 15.3   HCT 45.6 47.1   MCV 91 92   MCH 29.4 29.7   MCHC 32.2 32.5   RDW 12.8 12.8   * 154     INR  Recent Labs   Lab 04/30/21  0743 04/29/21  1758   INR 1.09 0.99       PHYSICAL EXAM:  Constitutional: healthy, alert, no acute distress and cooperative   Cardiovascular: RRR  Respiratory: CTAB anteriorly, breathing unlabored without secondary muscle use  Psychiatric: mentation appears normal and affect normal/bright  Neck: no asymmetry  GI/Abdomen: +BS, abdomen soft, non-tender. No masses, no CVAT  MSK: able to move all extremities without weakness or ataxia  Extremities: no open lesions, extremities warm and well perfused  Hematology: no bruising on visible skin    IMAGING:     HEAD CTA:   1.  Occlusion of the right internal carotid artery at the skull base with reconstitution of this  vessel presumably via an intact [place] of [place]. There is evidence for asymmetrically reduced flow within the supraclinoid right ICA segment and proximal   right anterior circulation branches.  2.  No additional proximal branch occlusion. Specifically, the right ophthalmic artery remains patent.  3.  No aneurysm or high flow vascular malformation.     NECK CTA:  1.  Occlusion of the right internal carotid artery just beyond its origin. This is new compared to a neck CT from 09/24/2015 and may be acute to subacute in nature.  2.  No hemodynamically significant stenosis in the remaining cervical vessels.    Patient Active Problem List   Diagnosis     SOB (shortness of breath)     COPD (chronic obstructive pulmonary disease) (H)     Chronic back pain     Benign essential hypertension     CARDIOVASCULAR SCREENING; LDL GOAL LESS THAN 160     Lipohypertrophy     Multiple symmetrical lipomatosis     Hx of thrombocytopenia     Pulmonary hypertension (H)     Obesity (BMI 35.0-39.9) with comorbidity (H)     Acute Right Internal Carotid Occlusion      Lipomatosis     Obesity with body mass index 30 or greater     Vision changes -- Possible Right Amaurosis Fugax        ASSESSMENT:  61 year old male with right ICA occlusion and resolved TIA symptoms.      PLAN:  -Optimize medical management  Since we are not sure of the chronicity of the right ICA occlusion we cannot say for sure if this is due to an acute occlusion or this is a stump sydrome.      Seen and examined with Dr. Erma Pina MD  General Surgery PGY4    Vascular surgery attending staff note: I seen and examined the patient myself.  I have reviewed the imaging.  I have discussed the findings with our resident, Dr. Pina as well as attending neurologist Dr. Poole.   61-year-old male who was admitted with right eye visual changes.  On further work-up he was also found to have occlusion of the right internal carotid artery from the carotid bulb to the  supraclinoid portions.  His left internal carotid artery is patent.  At this point we do not know the chronicity of the internal carotid artery occlusion.  Therefore I cannot say for sure if this is acute internal carotid artery occlusion on the right side versus a carotid stump syndrome.  At present the management will be aspirin, Plavix and statin all 3 of which have been started.  I would recommend a higher dose of Lipitor.  He is presently at 10 mg daily.  If he has recurrent symptoms of the right eye then we will have to treat this as a carotid syndrome and perform surgery for the same.  I have given my contact information to the patient's daughter and have strongly advised that should be have recurrent symptoms of this nature with his right eye in spite of best medical management then he should contact us and get in touch with my office so I can see him and plan on surgical intervention.    INNA GARZON M.D.

## 2021-05-01 VITALS
WEIGHT: 220 LBS | DIASTOLIC BLOOD PRESSURE: 81 MMHG | SYSTOLIC BLOOD PRESSURE: 131 MMHG | RESPIRATION RATE: 16 BRPM | TEMPERATURE: 97.9 F | HEART RATE: 102 BPM | BODY MASS INDEX: 36.05 KG/M2 | OXYGEN SATURATION: 93 %

## 2021-05-01 DIAGNOSIS — R06.02 SOB (SHORTNESS OF BREATH): Primary | ICD-10-CM

## 2021-05-01 PROBLEM — G45.9 TIA (TRANSIENT ISCHEMIC ATTACK): Status: ACTIVE | Noted: 2021-05-01

## 2021-05-01 PROCEDURE — 250N000013 HC RX MED GY IP 250 OP 250 PS 637: Performed by: STUDENT IN AN ORGANIZED HEALTH CARE EDUCATION/TRAINING PROGRAM

## 2021-05-01 PROCEDURE — 99232 SBSQ HOSP IP/OBS MODERATE 35: CPT | Performed by: PSYCHIATRY & NEUROLOGY

## 2021-05-01 RX ORDER — ACETAMINOPHEN 325 MG/1
650 TABLET ORAL EVERY 4 HOURS PRN
Refills: 0
Start: 2021-05-01

## 2021-05-01 RX ORDER — ASPIRIN 325 MG
325 TABLET, DELAYED RELEASE (ENTERIC COATED) ORAL DAILY
Qty: 100 TABLET | Refills: 3 | Status: SHIPPED | OUTPATIENT
Start: 2021-05-22 | End: 2022-06-03

## 2021-05-01 RX ORDER — LISINOPRIL 10 MG/1
10 TABLET ORAL DAILY
Refills: 0
Start: 2021-05-02 | End: 2021-08-23

## 2021-05-01 RX ORDER — CLOPIDOGREL BISULFATE 75 MG/1
75 TABLET ORAL DAILY
Qty: 20 TABLET | Refills: 0 | Status: SHIPPED | OUTPATIENT
Start: 2021-05-02 | End: 2022-01-24

## 2021-05-01 RX ORDER — ATORVASTATIN CALCIUM 40 MG/1
40 TABLET, FILM COATED ORAL EVERY EVENING
Qty: 30 TABLET | Refills: 3 | Status: SHIPPED | OUTPATIENT
Start: 2021-05-01 | End: 2021-05-24

## 2021-05-01 RX ADMIN — CLOPIDOGREL BISULFATE 75 MG: 75 TABLET ORAL at 08:06

## 2021-05-01 RX ADMIN — LISINOPRIL 10 MG: 10 TABLET ORAL at 08:06

## 2021-05-01 RX ADMIN — ACETAMINOPHEN 650 MG: 325 TABLET, FILM COATED ORAL at 04:50

## 2021-05-01 RX ADMIN — ASPIRIN 81 MG: 81 TABLET, DELAYED RELEASE ORAL at 08:06

## 2021-05-01 ASSESSMENT — ACTIVITIES OF DAILY LIVING (ADL)
ADLS_ACUITY_SCORE: 10

## 2021-05-01 NOTE — PLAN OF CARE
[id number]  A&Ox4.  VSS excet SBP-139, HR-104.  2L at bedtime per pt baseline.  Afebrile.  Up independently.  Tolerating regular diet.  PIV -SL.  Voiding in bathroom.  CMS/Neuros intact.  C/O 2/10 right anterior HA but pt declined Tylenol when offered (Hospitalist aware).  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  Vascular Surgery signed off.  Neurology following.  Had Echo, US-Carotid, and MRI done today.  Tele-NSR.

## 2021-05-01 NOTE — PLAN OF CARE
Pt is A/O. VSS. Denies pain. Independent in room. CMS intact. Neuro intact. Will continue to monitor.

## 2021-05-01 NOTE — UTILIZATION REVIEW
"  Admission Status; Secondary Review Determination     Admission Date: 4/30/2021 12:54 AM      Under the authority of the Utilization Management Committee, the utilization review process indicated a secondary review on the above patient.  The review outcome is based on review of the medical records, discussions with staff, and applying clinical experience noted on the date of the review.        (x)      Inpatient Status Appropriate - This patient's medical care is consistent with medical management for inpatient care and reasonable inpatient medical practice.      () Observation Status Appropriate - This patient does not meet hospital inpatient criteria and is placed in observation status. If this patient's primary payer is Medicare and was admitted as an inpatient, Condition Code 44 should be used and patient status changed to \"observation\".   () Admission Status NOT Appropriate - This patient's medical care is not consistent with medical management for Inpatient or Observation Status.          RATIONALE FOR DETERMINATION   Otf Bolaños is a 61 year old male with past medical history of COPD, hypertension, and pulmonary hypertension.  He presented to the hospital with loss of vision in his right eye.  Imaging has found right internal carotid artery occlusion.  He was placed in the hospital for further evaluation and management.  He has been seen in consultation by neurology and vascular surgery.  Symptoms have currently resolved.  Vascular surgery has recommended that if symptoms were to return, he does need urgent surgical intervention.  He was started on several medications including aspirin, clopidogrel, and atorvastatin.  He has required close monitoring in the hospital.  Due to this patient's acute right eye vision loss, right internal carotid artery occlusion, need for consultation by neurology and vascular surgery, need for close monitoring in case he needed urgent surgical intervention, and need for " initiation of multiple medications, it is appropriate to have him admitted to the hospital as an inpatient.      The severity of illness, intensity of service provided, expected LOS and risk for adverse outcome make the care complex, high risk and appropriate for hospital admission.        The information on this document is developed by the utilization review team in order for the business office to ensure compliance.  This only denotes the appropriateness of proper admission status and does not reflect the quality of care rendered.         The definitions of Inpatient Status and Observation Status used in making the determination above are those provided in the CMS Coverage Manual, Chapter 1 and Chapter 6, section 70.4.      Sincerely,     Lloyd Rooney D.O.  Utilization Review/ Case Management  [facility].

## 2021-05-01 NOTE — PROGRESS NOTES
"[facility]    Stroke Progress Note    Interval Events  Feels well today with no complaints    TIA Evaluation summarized:  MRI/Head CT: MRI brain shows no stroke  Intracranial Vascular Imaging: CTA head shows patent R ophthalmic artery. R ICA occlusion  Cervical Carotid and Vertebral Artery Vascular Imaging: CTA neck shows R ICA occlusion, new compared to 2015  Echocardiogram: EF 55-60%, LV systolic function normal, LA size normal  EKG/Telemetry: SR with short MT  LDL: 85  A1c: 5.8  Troponin: <0.015   Other testing: Carotid duplex shows no flow R ICA    Impression  1. Transient Ischemic Attack (R eye visual loss) in the setting of:  2. R ICA occlusion, probably chronic     Plan  - Continue permissive HTN for now, goal <180 ok  - Start lipitor 40mg daily for goal LDL 40-70  - Continue Plavix 75 mg and aspirin 81mg daily together for 21 days; then aspirin 325mg alone  - PLC stroke education consult  - KAYLIE eval as outpatient   - Repeat CTA head/neck as outpatient in 6-8 weeks  to evaluate for any interim auto recanalization    - In 6-8 weeks with Dr. Shashi Lemon at [facility]. Patient will be contacted by virtual stroke clinic team after discharge.        Ethel Hughes PA-C  Neurology  05/01/2021 11:19 AM  To page me or covering stroke neurology team member, click here: AMCOM   Choose \"On Call\" tab at top, then search dropdown box for \"Neurology Adult\", select location, press Enter, then look for stroke/neuro ICU/telestroke.    ______________________________________________________    Medications   Home Meds  Prior to Admission medications    Medication Sig Start Date End Date Taking? Authorizing Provider   acetaminophen (TYLENOL) 325 MG tablet Take 2 tablets (650 mg) by mouth every 4 hours as needed for mild pain 5/1/21  Yes Rene Monreal MD   albuterol (PROAIR HFA/PROVENTIL HFA/VENTOLIN HFA) 108 (90 Base) MCG/ACT inhaler Inhale 2 puffs into the lungs " every 6 hours 2/1/21  Yes Brian Mckinley PA-C   aspirin (ASA) 325 MG EC tablet Take 1 tablet (325 mg) by mouth daily 5/22/21  Yes Rene Monreal MD   aspirin (ASA) 81 MG EC tablet Take 1 tablet (81 mg) by mouth daily for 20 days 5/2/21 5/22/21 Yes Rene Monreal MD   atorvastatin (LIPITOR) 40 MG tablet Take 1 tablet (40 mg) by mouth every evening 5/1/21  Yes Rene Monreal MD   clopidogrel (PLAVIX) 75 MG tablet Take 1 tablet (75 mg) by mouth daily 5/2/21  Yes Rene Monreal MD   ibuprofen (ADVIL/MOTRIN) 200 MG tablet Take 200-400 mg by mouth every 4 hours as needed   Yes Unknown, Entered By History   lisinopril (ZESTRIL) 10 MG tablet Take 1 tablet (10 mg) by mouth daily 5/2/21  Yes Rene Monreal MD   multivitamin, therapeutic (THERA-VIT) TABS tablet Take 1 tablet by mouth daily   Yes Unknown, Entered By History   sildenafil (REVATIO) 20 MG tablet Take 2-5 tablets (40-100mg) 30 minutes to 4 hours before sex 7/30/18   Brian Mckinley PA-C       Scheduled Meds    aspirin  81 mg Oral Daily     atorvastatin  40 mg Oral QPM     clopidogrel  75 mg Oral Daily     lisinopril  10 mg Oral Daily     sodium chloride (PF)  3 mL Intracatheter Q8H       Infusion Meds      PRN Meds  acetaminophen, lidocaine 4%, lidocaine (buffered or not buffered), melatonin, ondansetron **OR** ondansetron, sodium chloride (PF)       PHYSICAL EXAMINATION  Temp:  [97.5  F (36.4  C)-98  F (36.7  C)] 97.9  F (36.6  C)  Pulse:  [102-117] 102  Resp:  [16] 16  BP: (114-139)/(66-81) 131/81  SpO2:  [91 %-95 %] 93 %     Neurologic  Mental Status:  alert, oriented x 3, follows commands, speech clear and fluent  Cranial Nerves:  PERRL, EOMI with normal smooth pursuit, facial movements symmetric, hearing not formally tested but intact to conversation, palate elevation symmetric and uvula midline, no dysarthria  Motor:  able to move all limbs spontaneously  Sensory:  deferred  Coordination:  deferred  Station/Gait:  deferred         Imaging  I personally reviewed all imaging; relevant findings per HPI.     Lab Results Data   CBC  Recent Labs   Lab 04/30/21  0743 04/29/21  1758   WBC 9.5 12.5*   RBC 5.00 5.15   HGB 14.7 15.3   HCT 45.6 47.1   * 154     Basic Metabolic Panel    Recent Labs   Lab 04/30/21  0743 04/29/21  1758    137   POTASSIUM 4.0 4.1   CHLORIDE 105 105   CO2 31 32   BUN 13 14   CR 0.91 0.90   * 90   PABLO 8.6 9.0     Liver Panel  No results for input(s): PROTTOTAL, ALBUMIN, BILITOTAL, ALKPHOS, AST, ALT, BILIDIRECT in the last 168 hours.  INR    Recent Labs   Lab Test 04/30/21  0743 04/29/21  1758 02/17/14  0510   INR 1.09 0.99 1.11      Lipid Profile    Recent Labs   Lab Test 04/30/21  0743 02/01/21  0809 02/11/19  0857 10/26/15  1045 10/26/15  1045 04/30/15  0857 12/31/13   CHOL 155 151 200*   < > 194 167 209*   HDL 46 51 55   < > 47 55 70   LDL 85 76 119*   < > 119 92 102   TRIG 121 118 131   < > 140 101 186   CHOLHDLRATIO  --   --   --   --  4.1 3.0 3.0    < > = values in this interval not displayed.     A1C    Recent Labs   Lab Test 04/30/21  0743 10/26/15  1045   A1C 5.8* 5.5     Troponin I    Recent Labs   Lab 04/29/21  1758   TROPI <0.015

## 2021-05-01 NOTE — PLAN OF CARE
Pt is stable. Discharge paper work reviewed with pt and daughter. Prescriptions sent with patient. All concerns addressed.

## 2021-05-03 ENCOUNTER — TELEPHONE (OUTPATIENT)
Dept: NEUROLOGY | Facility: CLINIC | Age: 61
End: 2021-05-03

## 2021-05-03 ENCOUNTER — TELEPHONE (OUTPATIENT)
Dept: FAMILY MEDICINE | Facility: CLINIC | Age: 61
End: 2021-05-03

## 2021-05-03 NOTE — TELEPHONE ENCOUNTER
LVM for pt explaining that I have confirmed appts for him on 7/13 at 9:20am for his CTA and his stroke f/u appt with Dr. Lemon on 4/19 at 12pm. CC callback number provided.    No Gandhi BS, RN, SCRN  RN Stroke Neurology Care Coordinator  [facility] Neuroscience Service Line  Phone: [phone]  Email: [email or web address]

## 2021-05-03 NOTE — TELEPHONE ENCOUNTER
Please contact patient for In-patient follow up.  [phone] (home)     Visit date: 4/29-5/1  Diagnosis listed:TIA   Number of visits in past 12 months:1/1

## 2021-05-03 NOTE — TELEPHONE ENCOUNTER
VF team had contacted pt about scheduling follow-up with Dr. Lemon and CTA scan prior to appt. Pt declined scheduling and I was asked to reach out to him to better explain why we were trying to schedule him and the importance of following up with a stroke provider.     Pt agreeable and scheduled with Dr. Lemon for 7/19 at 12pm as he will be out of town when Dr. Lemon is in clinic on 7/5.     Called [facility] imaging scheduling to schedule CTA. Confirmed CTA appt at [facility] for 7/13 at 9:05am.    I told the pt I would update him later this afternoon with appt dates and times.    No Gandhi BS, RN, SCRN  RN Stroke Neurology Care Coordinator  [facility] Neuroscience Service Line  Phone: [phone]  Email: [email or web address]

## 2021-05-03 NOTE — TELEPHONE ENCOUNTER
"ED / Discharge Outreach Protocol    Patient Contact    Attempt # 1    Was call answered?  Yes.  \"May I please speak with <patient name>\"  Is patient available?   Yes    Hospital/TCU/ED for chronic condition Discharge Protocol    \"Hi, my name is Socorro Delong RN, a registered nurse, and I am calling from [facility].  I am calling to follow up and see how things are going for you after your recent emergency visit/hospital/TCU stay.\"    Tell me how you are doing now that you are home?\" I did just called to make a appt with Marquis and I am doing pretty good.       Discharge Instructions    \"Let's review your discharge instructions.  What is/are the follow-up recommendations?  Pt. Response: I do not have any questions I am taking my blood thinner and have an appt scheduled with Marquis.     \"Has an appointment with your primary care provider been scheduled?\"   Yes. (confirm)    \"When you see the provider, I would recommend that you bring your medications with you.\"    Medications    \"Tell me what changed about your medicines when you discharged?\"    Changes to chronic meds?    0-1    \"What questions do you have about your medications?\"    None     New diagnoses of heart failure, COPD, diabetes, or MI?    No              Post Discharge Medication Reconciliation Status: discharge medications reconciled, continue medications without change.    Was MTM referral placed (*Make sure to put transitions as reason for referral)?   No    Call Summary    \"What questions or concerns do you have about your recent visit and your follow-up care?\"     none    \"If you have questions or things don't continue to improve, we encourage you contact us through the main clinic number (give number).  Even if the clinic is not open, triage nurses are available 24/7 to help you.     We would like you to know that our clinic has extended hours (provide information).  We also have urgent care (provide details on closest location and " "hours/contact info)\"      \"Thank you for your time and take care!\"       Socorro Delong RN on 5/3/2021 at 8:37 AM        "

## 2021-05-06 NOTE — PROGRESS NOTES
Assessment & Plan     Occlusion of right carotid artery  TIA (transient ischemic attack)  Otf had a lot of questions that I answered to the best of my abilities. A lot of reassurance as well. Reviewed r/b/se of the NEW medications and why he needs a statin even though cholesterol has historically been excellent. He is aware of when to stop plavix and to continue aspirin and knows when to follow up with neurology. Again reviewed worrisome signs/sxs to watch for.     Pre-diabetes  Noted on labs. Reviewed importance of diet/exercise. He's already started to implement changes          Return in about 6 months (around 11/7/2021) for Follow up in 6 months for Medication Check.    Brian Mckinley PA-C  [facility] [place]    Josette   Otf is a 61 year old who presents for the following health issues     HPI       Hospital Follow-up Visit:    Hospital/Nursing Home/IP Rehab Facility: [facility]  Date of Admission: 4/30/21  Date of Discharge: 5/1/21  Reason(s) for Admission: Acute Right Internal Carotid Occlusion      Was your hospitalization related to COVID-19? No   Problems taking medications regularly:  None  Medication changes since discharge: None  Problems adhering to non-medication therapy:  None    Otf Bolaños is a 61 year old male who presents today for Hospital follow up   He was seen for acute right carotid occlusion  Had experienced some vision changes and tingling in his fingers  Vision changes to right eye  Taken to ED by spouse  On the way, the vision started to improve  Work up in ED showed right carotid occlusion; normal brain MRI  Started on preventive therapy - statin, asa and plavix      Summary of hospitalization:  [facility] discharge summary reviewed  Diagnostic Tests/Treatments reviewed.  Follow up needed: none  Other Healthcare Providers Involved in Patient s Care:         Specialist appointment - neurology 07/2021  Update since discharge:  "improved. Post Discharge Medication Reconciliation: discharge medications reconciled, continue medications without change.  Plan of care communicated with patient              Review of Systems   Constitutional, HEENT, cardiovascular, pulmonary, gi and gu systems are negative, except as otherwise noted.      Objective    /60 (BP Location: Right arm, Patient Position: Chair, Cuff Size: Adult Large)   Pulse 88   Temp 98.7  F (37.1  C) (Tympanic)   Resp 16   Ht 1.702 m (5' 7\")   Wt 102.3 kg (225 lb 8 oz)   SpO2 94%   BMI 35.32 kg/m    Body mass index is 35.32 kg/m .  Physical Exam   GENERAL: healthy, alert and no distress  EYES: Eyes grossly normal to inspection, PERRL and conjunctivae and sclerae normal  RESP: lungs clear to auscultation - no rales, rhonchi or wheezes  CV: regular rates and rhythm, normal S1 S2, no S3 or S4 and no murmur, click or rub  MS: No peripheral edema   PSYCH: mentation appears normal, affect normal/bright    Reviewed hospital labs and imaging          "

## 2021-05-07 ENCOUNTER — OFFICE VISIT (OUTPATIENT)
Dept: FAMILY MEDICINE | Facility: CLINIC | Age: 61
End: 2021-05-07
Payer: COMMERCIAL

## 2021-05-07 VITALS
OXYGEN SATURATION: 94 % | DIASTOLIC BLOOD PRESSURE: 60 MMHG | HEART RATE: 88 BPM | SYSTOLIC BLOOD PRESSURE: 100 MMHG | BODY MASS INDEX: 35.39 KG/M2 | HEIGHT: 67 IN | WEIGHT: 225.5 LBS | RESPIRATION RATE: 16 BRPM | TEMPERATURE: 98.7 F

## 2021-05-07 DIAGNOSIS — R73.03 PRE-DIABETES: ICD-10-CM

## 2021-05-07 DIAGNOSIS — G45.9 TIA (TRANSIENT ISCHEMIC ATTACK): ICD-10-CM

## 2021-05-07 DIAGNOSIS — I65.21 OCCLUSION OF RIGHT CAROTID ARTERY: Primary | ICD-10-CM

## 2021-05-07 PROCEDURE — 99213 OFFICE O/P EST LOW 20 MIN: CPT | Performed by: PHYSICIAN ASSISTANT

## 2021-05-07 ASSESSMENT — MIFFLIN-ST. JEOR: SCORE: 1776.49

## 2021-05-07 NOTE — PATIENT INSTRUCTIONS
Patient Education     Diet: Diabetes  Food is an important tool that you can use to control diabetes and stay healthy. Eating well-balanced meals in the correct amounts will help you control your blood glucose levels and prevent low blood sugar reactions. It will also help you reduce the health risks of diabetes. There is no one specific diet that is right for everyone with diabetes. But there are general guidelines to follow. A registered dietitian (RD) will create a tailored diet approach that s just right for you. He or she will also help you plan healthy meals and snacks. If you have any questions, call your dietitian for advice.  Guidelines for success  Talk with your healthcare provider before starting a diabetes diet or weight loss program. If you haven't talked with a dietitian yet, ask your provider for a referral. The following guidelines can help you succeed:    Select foods from the 6 food groups below. Your dietitian will help you find food choices within each group. He or she will also show you serving sizes and how many servings you can have at each meal.  ? Grains, beans, and starchy vegetables  ? Vegetables  ? Fruit  ? Milk or yogurt  ? Meat, poultry, fish, or tofu  ? Healthy fats    Check your blood sugar levels as directed by your provider. Take any medicine as prescribed by your provider.    Learn to read food labels and pick the right portion sizes.    Limit carbohydrates at each meal to help manage your diabetes. The carbohydrates you eat become glucose in the blood. Talk with your healthcare provider about how many grams of carbohydrates are recommended for you at each meal. Eat 3 meals a day, at consistent times. Don't skip meals. If you are hungry between meals, eat a small, low-carbohydrate snack.    Talk with your healthcare provider if you drink alcohol. Alcohol can have unpredictable effects on blood glucose. It's also high in empty calories and can raise a type of blood fat called  triglycerides. Drink water or calorie-free diet drinks instead.    Eat less fat to help lower your risk of heart disease. Use nonfat or low-fat dairy products and lean meats. Avoid fried foods. Use cooking oils that are unsaturated, such as olive, canola, or peanut oil.    Don't eat foods with added salt. Salt can contribute to high blood pressure, which can cause heart disease. People with diabetes already have a risk for high blood pressure and heart disease.    Stay at a healthy weight. If you need to lose weight, cut down on your portion sizes. But don t skip meals. Exercise is an important part of any weight management program. Talk with your provider about an exercise program that s right for you.    For more information about the best diet plan for you, talk with an RD. To find an RD in your area, contact:  ? Academy of Nutrition and Dietetics www.eatright.org  ? American Diabetes Association 296-413-2297 www.diabetes.org  Brianna last reviewed this educational content on 7/1/2019 2000-2021 [employer]. All rights reserved. This information is not intended as a substitute for professional medical care. Always follow your healthcare professional's instructions.

## 2021-05-24 ENCOUNTER — TELEPHONE (OUTPATIENT)
Dept: FAMILY MEDICINE | Facility: CLINIC | Age: 61
End: 2021-05-24

## 2021-05-24 DIAGNOSIS — G45.9 TIA (TRANSIENT ISCHEMIC ATTACK): ICD-10-CM

## 2021-05-24 RX ORDER — ATORVASTATIN CALCIUM 40 MG/1
40 TABLET, FILM COATED ORAL EVERY EVENING
Qty: 30 TABLET | Refills: 3 | Status: SHIPPED | OUTPATIENT
Start: 2021-05-24 | End: 2021-09-16

## 2021-05-24 NOTE — TELEPHONE ENCOUNTER
Need Rx for Atorvastatin. Need sent to [facility].  Pt then had question if needing to continue Plavix  Per chart review:    Aspirin 325 mg and plavix 75 mg daily x 21 days. Then aspirin 325 mg daily thereafter  Lipitor 40 mg for LDL goal <70.      Rene Nath MD  Pager: [phone]  Cell Phone:  [phone]     Pt advised of above, Patient stated an understanding and agreed with plan.  Rx resent to new Pharmacy.    Loc DUTTA RN   [facility]

## 2021-05-26 ENCOUNTER — CARE COORDINATION (OUTPATIENT)
Dept: NEUROLOGY | Facility: CLINIC | Age: 61
End: 2021-05-26

## 2021-05-26 NOTE — PROGRESS NOTES
Pt contacted and he would like virtual appt on 7/27 at 9:00am. Scheduled with Dr. Iqbal for that appt slot. Routing to [facility] Stroke RNCC just to inform of change in case anyone is confused at this appt change.    No Gandhi BS, RN, SCRN  RN Stroke Neurology Care Coordinator  [facility] Neuroscience Service Line  Phone: [phone]

## 2021-05-28 NOTE — TELEPHONE ENCOUNTER
RECORDS RECEIVED FROM: Internal   Date of Appt: 7/27/21   NOTES (FOR ALL VISITS) STATUS DETAILS   OFFICE NOTE from referring provider Internal SEE INPATIENT NOTES   OFFICE NOTE from other specialist N/A    DISCHARGE SUMMARY from hospital [facility]:  4/30/21-5/1/21   DISCHARGE REPORT from the ER [facility]:  4/29/21   OPERATIVE REPORT N/A    MEDICATION LIST Internal    IMAGING  (FOR ALL VISITS)     EMG N/A    EEG N/A    LUMBAR PUNCTURE N/A    GAGE SCAN N/A    ULTRASOUND (CAROTID BILAT) *VASCULAR* [facility]:  US Carotid Bilat 4/30/21   MRI (HEAD, NECK, SPINE) [facility]:  MRI Brain 4/30/21   CT (HEAD, NECK, SPINE) [facility]:  CTA Head 7/13/21  CTA Head Neck 4/29/21  CT Head 4/29/21

## 2021-07-13 ENCOUNTER — HOSPITAL ENCOUNTER (OUTPATIENT)
Dept: CT IMAGING | Facility: CLINIC | Age: 61
Discharge: HOME | End: 2021-07-13
Attending: PHYSICIAN ASSISTANT | Admitting: PHYSICIAN ASSISTANT
Payer: COMMERCIAL

## 2021-07-13 DIAGNOSIS — G45.9 TIA (TRANSIENT ISCHEMIC ATTACK): ICD-10-CM

## 2021-07-13 DIAGNOSIS — I65.21 CAROTID OCCLUSION, RIGHT: ICD-10-CM

## 2021-07-13 PROCEDURE — 250N000011 HC RX IP 250 OP 636: Performed by: PHYSICIAN ASSISTANT

## 2021-07-13 PROCEDURE — 70496 CT ANGIOGRAPHY HEAD: CPT

## 2021-07-13 PROCEDURE — 250N000009 HC RX 250: Performed by: PHYSICIAN ASSISTANT

## 2021-07-13 RX ORDER — IOPAMIDOL 755 MG/ML
500 INJECTION, SOLUTION INTRAVASCULAR ONCE
Status: COMPLETED | OUTPATIENT
Start: 2021-07-13 | End: 2021-07-13

## 2021-07-13 RX ADMIN — SODIUM CHLORIDE 80 ML: 9 INJECTION, SOLUTION INTRAVENOUS at 09:07

## 2021-07-13 RX ADMIN — IOPAMIDOL 70 ML: 755 INJECTION, SOLUTION INTRAVENOUS at 09:07

## 2021-07-13 NOTE — RESULT ENCOUNTER NOTE
ICA has recanalized with resultant severe stenosis seen. I discussed with Dr. Lemon who states he will discuss with Dr. Ritchie today.

## 2021-07-27 ENCOUNTER — TELEPHONE (OUTPATIENT)
Dept: NEUROLOGY | Facility: CLINIC | Age: 61
End: 2021-07-27

## 2021-07-27 ENCOUNTER — VIRTUAL VISIT (OUTPATIENT)
Dept: NEUROLOGY | Facility: CLINIC | Age: 61
End: 2021-07-27
Payer: COMMERCIAL

## 2021-07-27 ENCOUNTER — PRE VISIT (OUTPATIENT)
Dept: NEUROLOGY | Facility: CLINIC | Age: 61
End: 2021-07-27

## 2021-07-27 DIAGNOSIS — I65.21 STENOSIS OF RIGHT CAROTID ARTERY WITHOUT CEREBRAL INFARCTION: Primary | ICD-10-CM

## 2021-07-27 DIAGNOSIS — H54.60 MONOCULAR VISION LOSS: ICD-10-CM

## 2021-07-27 DIAGNOSIS — G45.3 AMAUROSIS FUGAX: ICD-10-CM

## 2021-07-27 DIAGNOSIS — G45.3 AMAUROSIS FUGAX OF RIGHT EYE: ICD-10-CM

## 2021-07-27 DIAGNOSIS — I65.21: ICD-10-CM

## 2021-07-27 PROCEDURE — 99443 PR PHYSICIAN TELEPHONE EVALUATION 21-30 MIN: CPT | Mod: 95 | Performed by: PSYCHIATRY & NEUROLOGY

## 2021-07-27 NOTE — PROGRESS NOTES
Otf is a 61 year old who is being evaluated via a billable telephone visit.      What phone number would you like to be contacted at? [phone]    How would you like to obtain your AVS? Tarun  Phone call duration: 12 minutes

## 2021-07-27 NOTE — LETTER
"7/27/2021       RE: Otf Bolaños  [address] [place] [address]     Dear Colleague,    Thank you for referring your patient, Otf Bolaños, to the [facility] [place] [facility]. Please see a copy of my visit note below.    July 27, 2021    Otf Bolaños                                                                                                                     [address] [place] [address]    Telephone visit: 9 AM - 9:12AM (12 minutes)  Documentation, review of multiple images, care coordination: 20 minutes  Total: 32 minutes    Mr. Bolaños is a 61 year old gentleman seen via a telephone visit. This gentleman was hospitalized around 4/29/21 with transient visual symptoms. This is described as \"darkening of vision\" in his R eye. The symptoms lasted for about 30 minutes. His wife drove him to the hospital and a CTA showed an occluded R ICA. The R ophthalmic artery was patent.  This occlusion was new since a scan from 9/24/2015. The patient subsequently had a repeat CTA on 7/13/21. This showed recanalization but with persistent high grade stenosis of the R ICA. The patient had a TTE which was unremarkable but there was no bubble study done. The patient is currently on 325 mg of aspirin. He reports that he has stopped plavix. He is on a statin and Lisinopril.     We did a complete review of stroke symptoms and the patient denies any stroke symptoms.    ASSESSMENT / PLAN    Encounter Diagnoses   Name Primary?     Stenosis of right carotid artery without cerebral infarction Yes     Amaurosis fugax of right eye      Monocular vision loss      Obstruction of right carotid artery without cerebral infarction      Amaurosis fugax           This 61 year old gentleman had an obstruction of the R ICA and transient visual symptoms toward the end of April 2021. The occlusion was just beyond the origin of the " R ICA and there was distal re-constitution from the COW. The ophthalmic artery was spared. Subsequent scan on 7/13/21 showed recanalization but still a high grade stenosis of the R ICA. The patient is currently asymptomatic. The etiology of the carotid occlusion could be atherosclerotic - however, given the re-canalization, it is possible that a distal embolus also lodged there and later re-canalized. He does have some baseline atherosclerosis since the carotid is still stenotic. I have placed an ESN referral for consideration of an angiogram and possible stenting in this gentleman. I have ordered a Ziopatch and a bubble study to complete cardiac work up.   Orders Placed This Encounter   Procedures     Neurosurgery Referral     Leadless EKG Monitor 8 to 14 Days     Echocardiogram Complete with Bubble Study                   CTA 4/29/21  IMPRESSION:      HEAD CTA:   1.  Occlusion of the right internal carotid artery at the skull base with reconstitution of this vessel presumably via an intact [place] of [place]. There is evidence for asymmetrically reduced flow within the supraclinoid right ICA segment and proximal   right anterior circulation branches.  2.  No additional proximal branch occlusion. Specifically, the right ophthalmic artery remains patent.  3.  No aneurysm or high flow vascular malformation.     NECK CTA:  1.  Occlusion of the right internal carotid artery just beyond its origin. This is new compared to a neck CT from 09/24/2015 and may be acute to subacute in nature.  2.  No hemodynamically significant stenosis in the remaining cervical vessels.    CTA 7/13/21  IMPRESSION:  1. Interval recanalization of the previously occluded right internal  carotid artery with persistent high-grade stenosis of the origin and a  thin column of contrast throughout the cervical and intracranial right  internal carotid artery.  2. Severe emphysematous change in the upper lungs bilaterally again  noted.  3. Otherwise,  normal neck and head CTA.       Brain MRI 21                                                                   IMPRESSION:  Unremarkable brain MRI without evidence of acute infarct,  mass, hemorrhage, or herniation.      TTE     Left ventricular systolic function is normal.  The visual ejection fraction is estimated at 55-60%.  No regional wall motion abnormalities noted.  The study was technically difficult. There is no comparison study available.  ______________________________________________________________________________      Past Medical History:   Diagnosis Date     Collapse of left lung      ETOH abuse 2014     Hypertension      Hypokalemia 2014     Low back pain      Mild malnutrition (H) 2014     Multiple symmetrical lipomatosis 2015     Thrombocytopaenia      Tobacco abuse 2014     Varicose vein      VRE (vancomycin-resistant Enterococci) 2/10/14    Urine     Social History     Tobacco Use     Smoking status: Former Smoker     Packs/day: 2.00     Years: 30.00     Pack years: 60.00     Quit date: 2014     Years since quittin.5     Smokeless tobacco: Never Used   Substance Use Topics     Alcohol use: Not Currently     Drug use: No     EXAM    Alert, appropriate with normal speech and language comprehension and production  Denies any weakness, numbness, balance or vision difficulties and says he is at his baseline      Again, thank you for allowing me to participate in the care of your patient.      Sincerely,    Fanny Iqbal MD

## 2021-07-27 NOTE — PROGRESS NOTES
"      [facility] NEUROLOGY CLINIC [address]  3RD FLOOR  [facility] [id number] [phone]          July 27, 2021    Otf Bolaños                                                                                                                     [address] [place] [address]    Telephone visit: 9 AM - 9:12AM (12 minutes)  Documentation, review of multiple images, care coordination: 20 minutes  Total: 32 minutes    Mr. Bolaños is a 61 year old gentleman seen via a telephone visit. This gentleman was hospitalized around 4/29/21 with transient visual symptoms. This is described as \"darkening of vision\" in his R eye. The symptoms lasted for about 30 minutes. His wife drove him to the hospital and a CTA showed an occluded R ICA. The R ophthalmic artery was patent.  This occlusion was new since a scan from 9/24/2015. The patient subsequently had a repeat CTA on 7/13/21. This showed recanalization but with persistent high grade stenosis of the R ICA. The patient had a TTE which was unremarkable but there was no bubble study done. The patient is currently on 325 mg of aspirin. He reports that he has stopped plavix. He is on a statin and Lisinopril.     We did a complete review of stroke symptoms and the patient denies any stroke symptoms.    ASSESSMENT / PLAN    Encounter Diagnoses   Name Primary?     Stenosis of right carotid artery without cerebral infarction Yes     Amaurosis fugax of right eye      Monocular vision loss      Obstruction of right carotid artery without cerebral infarction      Amaurosis fugax           This 61 year old gentleman had an obstruction of the R ICA and transient visual symptoms toward the end of April 2021. The occlusion was just beyond the origin of the R ICA and there was distal re-constitution from the COW. The ophthalmic artery was spared. Subsequent scan on 7/13/21 showed recanalization but still a high grade stenosis of the R ICA. " The patient is currently asymptomatic. The etiology of the carotid occlusion could be atherosclerotic - however, given the re-canalization, it is possible that a distal embolus also lodged there and later re-canalized. He does have some baseline atherosclerosis since the carotid is still stenotic. I have placed an ESN referral for consideration of an angiogram and possible stenting in this gentleman. I have ordered a Ziopatch and a bubble study to complete cardiac work up.   Orders Placed This Encounter   Procedures     Neurosurgery Referral     Leadless EKG Monitor 8 to 14 Days     Echocardiogram Complete with Bubble Study                   CTA 4/29/21  IMPRESSION:      HEAD CTA:   1.  Occlusion of the right internal carotid artery at the skull base with reconstitution of this vessel presumably via an intact Agdaagux of Boss. There is evidence for asymmetrically reduced flow within the supraclinoid right ICA segment and proximal   right anterior circulation branches.  2.  No additional proximal branch occlusion. Specifically, the right ophthalmic artery remains patent.  3.  No aneurysm or high flow vascular malformation.     NECK CTA:  1.  Occlusion of the right internal carotid artery just beyond its origin. This is new compared to a neck CT from 09/24/2015 and may be acute to subacute in nature.  2.  No hemodynamically significant stenosis in the remaining cervical vessels.    CTA 7/13/21  IMPRESSION:  1. Interval recanalization of the previously occluded right internal  carotid artery with persistent high-grade stenosis of the origin and a  thin column of contrast throughout the cervical and intracranial right  internal carotid artery.  2. Severe emphysematous change in the upper lungs bilaterally again  noted.  3. Otherwise, normal neck and head CTA.       Brain MRI 4/30/21                                                                   IMPRESSION:  Unremarkable brain MRI without evidence of acute  infarct,  mass, hemorrhage, or herniation.      TTE     Left ventricular systolic function is normal.  The visual ejection fraction is estimated at 55-60%.  No regional wall motion abnormalities noted.  The study was technically difficult. There is no comparison study available.  ______________________________________________________________________________      Past Medical History:   Diagnosis Date     Collapse of left lung      ETOH abuse 2014     Hypertension      Hypokalemia 2014     Low back pain      Mild malnutrition (H) 2014     Multiple symmetrical lipomatosis 2015     Thrombocytopaenia      Tobacco abuse 2014     Varicose vein      VRE (vancomycin-resistant Enterococci) 2/10/14    Urine     Social History     Tobacco Use     Smoking status: Former Smoker     Packs/day: 2.00     Years: 30.00     Pack years: 60.00     Quit date: 2014     Years since quittin.5     Smokeless tobacco: Never Used   Substance Use Topics     Alcohol use: Not Currently     Drug use: No     EXAM    Alert, appropriate with normal speech and language comprehension and production  Denies any weakness, numbness, balance or vision difficulties and says he is at his baseline      Fanny Iqbal MD

## 2021-07-27 NOTE — TELEPHONE ENCOUNTER
"Per Dr. Iqbal - urgent referral to Endovascular Surgical Neuroradiology for \"Intervention may be needed. R carotid occlusion - now appears re-canalized but still high grade stenosis. Transient monocular blindness\"    Virtual appointment scheduled with Dr. Kaye for 7/28/21 at 2:00 PM.     [facility] informing patient of above appointment. Encouraged to return call. Charissa Hurtado RN 7/27/2021 2:40 PM     Addendum:  Spoke with patient. Informed of appointment above. Discussed the possibility of an angiogram and the details of the procedure. Patient verbalized understanding. All questions answered. Contact information provided and encouraged to call with questions/concerns. Charissa Hurtado RN 7/27/2021 3:28 PM           "

## 2021-07-28 ENCOUNTER — VIRTUAL VISIT (OUTPATIENT)
Dept: NEUROSURGERY | Facility: CLINIC | Age: 61
End: 2021-07-28
Payer: COMMERCIAL

## 2021-07-28 ENCOUNTER — PRE VISIT (OUTPATIENT)
Dept: NEUROSURGERY | Facility: CLINIC | Age: 61
End: 2021-07-28

## 2021-07-28 DIAGNOSIS — I65.21 STENOSIS OF RIGHT CAROTID ARTERY WITHOUT CEREBRAL INFARCTION: ICD-10-CM

## 2021-07-28 DIAGNOSIS — H54.60 MONOCULAR VISION LOSS: ICD-10-CM

## 2021-07-28 PROCEDURE — 99442 PR PHYSICIAN TELEPHONE EVALUATION 11-20 MIN: CPT | Mod: 95 | Performed by: NEUROLOGICAL SURGERY

## 2021-07-28 NOTE — TELEPHONE ENCOUNTER
FUTURE VISIT INFORMATION      FUTURE VISIT INFORMATION:    Date: 7/28/2021    Time: 2pm    Location: [facility] [place]  REFERRAL INFORMATION:    Referring provider:  Dr. Foss     Referring providers clinic: [facility] Stroke     Reason for visit/diagnosis      RECORDS REQUESTED FROM:       Clinic name Comments Records Status Imaging Status   Internal Dr. Pierson-7/27/2021    CTA Head/Neck-7/13/2021, 4/29/2021    MR Brain-4/30/2021    CT Head-4/29/2021 Epic PACS

## 2021-07-28 NOTE — PROGRESS NOTES
Telephone visit.  He gave consent for the visit.      Visit duration: 15 min.    Please see dictation for visit details.

## 2021-07-28 NOTE — PROGRESS NOTES
Service Date: 2021    Fanny Iqbal  [facility]  [address]  [place]  [address]    RE:     Otf Bolaños  MRN:  [id number]  :  1960    Dear Dr. Iqbal:    I had the pleasure to talk to Otf Bolaños today by telephone during a neurosurgical phone clinic visit.  He gave consent for this visit.    Briefly, Otf is a 61-year-old gentleman who in April this year was admitted to the [facility] with a stroke.  At that time, he was found to have an occluded right internal carotid artery.  Fortunately for him, all of his deficits have resolved and he is neurologically intact at this point in time.    You had seen him yesterday and felt that there was some recannulization of the internal carotid artery and are requesting a cerebral angiogram with possible stenting for treatment of carotid artery disease.    Today I reviewed his CT angiogram that was dated 2021 and I have compared this to the CT angiogram from 2021.  There does appear to be some recannulization although it is not clear to me whether or not this is retrograde or antegrade contrast.  It does look like there is still occlusion in the antegrade flow in the right carotid.  That said the definitive way to evaluate this would be a diagnostic cerebral angiogram.  As such, I agree with you that a diagnostic cerebral angiogram is of value.  Today I talked to him about the risks and benefits of the diagnostic angiogram and he would like to proceed with that.  I have also talked to him about treatment if the carotid artery is in fact open.  If it is, there would be high-grade stenosis based on the CT angiogram.  With this in mind, if there is antegrade flow in the carotid and there is complete recanalization with high-grade stenosis, then I would plan to place a carotid stent at the time of the angiogram.  I talked to him about the risks and benefits of this including the risk of stroke  and death.  He understands this, and if the carotid artery is patent at the time of the angiogram, he would like to proceed with stenting.  With that in mind, we would like to preoperatively place him on aspirin and Plavix a week before the diagnostic angiogram so that we would be prepared to place a stent if the right carotid artery is open.  Again, he understands the risks and benefits of the diagnostic angiogram and the potential for stenting.  He is in agreement with this plan.    Sincerely,    Jesus Kaye MD        D: 2021   T: 2021   MT: liam    Name:     DEAN HERNÁNDEZ SHERRIE  MRN:      [id number]        Account:      [id number]   :      1960           Service Date: 2021       Document: [id number]

## 2021-07-28 NOTE — LETTER
2021       RE: Otf Bolaños  [address] [place] [address]     Dear Colleague,    Thank you for referring your patient, Otf Bolaños, to the [facility] NEUROSURGERY CLINIC [place] [facility]. Please see a copy of my visit note below.    Service Date: 2021    Fanny Iqbal  [facility]  [address]  [place]  [address]    RE:     Otf Bolaños  MRN:  [id number]  :  1960    Dear Dr. Iqbal:    I had the pleasure to talk to Otf Bolaños today by telephone during a neurosurgical phone clinic visit.  He gave consent for this visit.    Briefly, Otf is a 61-year-old gentleman who in April this year was admitted to the [facility] with a stroke.  At that time, he was found to have an occluded right internal carotid artery.  Fortunately for him, all of his deficits have resolved and he is neurologically intact at this point in time.    You had seen him yesterday and felt that there was some recannulization of the internal carotid artery and are requesting a cerebral angiogram with possible stenting for treatment of carotid artery disease.    Today I reviewed his CT angiogram that was dated 2021 and I have compared this to the CT angiogram from 2021.  There does appear to be some recannulization although it is not clear to me whether or not this is retrograde or antegrade contrast.  It does look like there is still occlusion in the antegrade flow in the right carotid.  That said the definitive way to evaluate this would be a diagnostic cerebral angiogram.  As such, I agree with you that a diagnostic cerebral angiogram is of value.  Today I talked to him about the risks and benefits of the diagnostic angiogram and he would like to proceed with that.  I have also talked to him about treatment if the carotid artery is in fact open.  If it is, there would be  high-grade stenosis based on the CT angiogram.  With this in mind, if there is antegrade flow in the carotid and there is complete recanalization with high-grade stenosis, then I would plan to place a carotid stent at the time of the angiogram.  I talked to him about the risks and benefits of this including the risk of stroke and death.  He understands this, and if the carotid artery is patent at the time of the angiogram, he would like to proceed with stenting.  With that in mind, we would like to preoperatively place him on aspirin and Plavix a week before the diagnostic angiogram so that we would be prepared to place a stent if the right carotid artery is open.  Again, he understands the risks and benefits of the diagnostic angiogram and the potential for stenting.  He is in agreement with this plan.    Sincerely,    Jesus Kaye MD

## 2021-07-29 ENCOUNTER — TELEPHONE (OUTPATIENT)
Dept: NEUROLOGY | Facility: CLINIC | Age: 61
End: 2021-07-29

## 2021-07-29 NOTE — TELEPHONE ENCOUNTER
Per 7/28/21 visit with Dr. Kaye patient to be scheduled for angio with intent to stent carotid. Aspirin 325 mg daily and Plavix 75 mg daily 7 days prior.     Spoke with patient. Informed:  Scheduling will reach out to arrange in the next few weeks.  Patient is on aspirin 325 mg. Will start Plavix 7 days prior.   Will be overnight if treated.  Will need COVID test and blood work 2-4 days prior.  Visitor policy.   Will call after scheduled, closer to appointment, and mail instructions.     Answered all questions. Patient verbalized understanding. Patient has my contact information and was encouraged to call with questions/concerns. Charissa Hurtado RN 7/29/2021 9:43 AM

## 2021-07-29 NOTE — PATIENT INSTRUCTIONS
Cerebral Angiogram with possible treatment of Caratid Stent if needed.    Start Plavix 75mg daily along with Aspirin 81mg daily 7 days prior to Angiogram.    Patient will be called to schedule procedure and given instructions on when to start medication.    Call Tika RN for questions/concerns     Thank you for using [facility]

## 2021-08-02 ENCOUNTER — ALLIED HEALTH/NURSE VISIT (OUTPATIENT)
Dept: CARDIOLOGY | Facility: CLINIC | Age: 61
End: 2021-08-02
Attending: PSYCHIATRY & NEUROLOGY
Payer: COMMERCIAL

## 2021-08-02 DIAGNOSIS — I65.21 STENOSIS OF RIGHT CAROTID ARTERY WITHOUT CEREBRAL INFARCTION: ICD-10-CM

## 2021-08-02 DIAGNOSIS — H54.60 MONOCULAR VISION LOSS: ICD-10-CM

## 2021-08-02 PROCEDURE — 93248 EXT ECG>7D<15D REV&INTERPJ: CPT | Performed by: INTERNAL MEDICINE

## 2021-08-03 ENCOUNTER — MEDICAL CORRESPONDENCE (OUTPATIENT)
Dept: HEALTH INFORMATION MANAGEMENT | Facility: CLINIC | Age: 61
End: 2021-08-03

## 2021-08-03 ENCOUNTER — TELEPHONE (OUTPATIENT)
Dept: FAMILY MEDICINE | Facility: CLINIC | Age: 61
End: 2021-08-03

## 2021-08-03 NOTE — TELEPHONE ENCOUNTER
Received orders, placed in Marquis HUYNH in basket.    Please review, sign and fax back to [phone].

## 2021-08-05 ENCOUNTER — TELEPHONE (OUTPATIENT)
Dept: NEUROSURGERY | Facility: CLINIC | Age: 61
End: 2021-08-05

## 2021-08-05 DIAGNOSIS — Z11.59 ENCOUNTER FOR SCREENING FOR OTHER VIRAL DISEASES: Primary | ICD-10-CM

## 2021-08-05 NOTE — TELEPHONE ENCOUNTER
Health Call Center    Phone Message    May a detailed message be left on voicemail: yes     Reason for Call: Other: Otf calling looking to speak with Alma. Patient states that he knows appointment is on 9/10 at 8:00 and is wondering if he is needing someone to help transfer him to the hospital.     Please advise and call Otf back to discuss further     Action Taken: Other: [facility]  NEUROSURGERY     Travel Screening: Not Applicable

## 2021-08-05 NOTE — TELEPHONE ENCOUNTER
Otf is scheduled on for 9/10/21 at 8:00 with Dr Kaye at [facility]. He's aware of the date and he's going to coordinate labs and call the clinic in [place].

## 2021-08-06 DIAGNOSIS — I65.21 STENOSIS OF RIGHT CAROTID ARTERY WITHOUT CEREBRAL INFARCTION: Primary | ICD-10-CM

## 2021-08-11 ENCOUNTER — TELEPHONE (OUTPATIENT)
Dept: NEUROSURGERY | Facility: CLINIC | Age: 61
End: 2021-08-11

## 2021-08-11 NOTE — TELEPHONE ENCOUNTER
Writer spoke with patient about procedure on 9/10/21 with Dr Kaye.    Confirmed time, location, and that he will be spending the night.

## 2021-08-11 NOTE — TELEPHONE ENCOUNTER
Spoke to patient relied message     Patient needs a  home. Als he is allowed to have one visitor at the hospital with him and that RN Raimundo BOLAÑOS will be contacting him closer to the appointment date with all instructions.       Patient agrees with plan and has no other questions or concerns at this time.

## 2021-08-20 ENCOUNTER — TELEPHONE (OUTPATIENT)
Dept: NEUROSURGERY | Facility: CLINIC | Age: 61
End: 2021-08-20

## 2021-08-20 NOTE — TELEPHONE ENCOUNTER
Outbound callback to Otf  No answer unable to leave a message.  9/10/21 Angiogram with possible Treatment scheduled with Dr Kaye    Patient will remain on ASA 325mg and   9/3/21 Start Plavix 75mg daily.      Information will be given by AUBREY Eubanks closer to the date.  Note sent Raimundo   Unable to leave this message for patient.

## 2021-08-20 NOTE — TELEPHONE ENCOUNTER
[facility] Call Center    Phone Message    May a detailed message be left on voicemail: yes     Reason for Call: Other: Patient calling looking to speak with Dr. Kaye and care team in regards to medication. States that after heart monitor test he was told to start a blood thinner but is not sure when he is supposed to start. States that he doesn't have medication and is wondering instructions and next steps to getting medication. States that he was done with heart monitor test on Monday 8/16 and mailed it in already.     Please advise and call patient back at your earliest convenience to discuss further     Action Taken: Other: [facility] NEUROSURGERY     Travel Screening: Not Applicable

## 2021-08-21 DIAGNOSIS — G45.9 TIA (TRANSIENT ISCHEMIC ATTACK): ICD-10-CM

## 2021-08-23 ENCOUNTER — PATIENT OUTREACH (OUTPATIENT)
Dept: NEUROLOGY | Facility: CLINIC | Age: 61
End: 2021-08-23

## 2021-08-23 DIAGNOSIS — I65.21 STENOSIS OF RIGHT CAROTID ARTERY: Primary | ICD-10-CM

## 2021-08-23 RX ORDER — LISINOPRIL 10 MG/1
TABLET ORAL
Qty: 90 TABLET | Refills: 1 | Status: SHIPPED | OUTPATIENT
Start: 2021-08-23 | End: 2022-01-24

## 2021-08-23 RX ORDER — CLOPIDOGREL BISULFATE 75 MG/1
75 TABLET ORAL DAILY
Qty: 30 TABLET | Refills: 3 | Status: SHIPPED | OUTPATIENT
Start: 2021-08-23 | End: 2022-01-24

## 2021-08-23 NOTE — PROGRESS NOTES
Informed patient of procedure instructions. Confirmed date and time. Patient verbalized understanding. All questions answered. Map and patient instructions mailed to patient. Patient has my contact information and was encouraged to call with questions/concerns. Charissa Hurtado RN 8/23/2021 11:05 AM

## 2021-08-23 NOTE — TELEPHONE ENCOUNTER
[facility] Call Center    Phone Message    May a detailed message be left on voicemail: yes     Reason for Call: Other: Pt called back to add that he is also  taking atorvastatin (LIPITOR) 40 MG tablet. He said that was not brought up while talking to Tika, but he wanted to make sure Vargas Raya was aware of his taking that medication too.    Action Taken: Message routed to:  [facility] Neurology    Travel Screening: Not Applicable

## 2021-08-23 NOTE — TELEPHONE ENCOUNTER
Routing refill request to provider for review/approval because:  NOT prescribed by Marquis Mckinley.       Return in about 6 months (around 11/7/2021) for Follow up in 6 months for Medication Check.     Brian Mckinley PA-C  [facility] RUBEN THAYER RN

## 2021-08-23 NOTE — PATIENT INSTRUCTIONS
You are scheduled for a cerebral angiogram with intention to stent, with Dr. Kaye on 9/10/21 at 8:00 AM.     Please follow these instructions:    * Prior to your procedure you will need to obtain COVID-19 testing within 2-4 days of your scheduled procedure. You are scheduled for 9/7/21 at 9:30 AM at [facility], located at [address] [place] [id number]. Their phone number is [phone]. You will also need to obtain blood work (ordered by Dr. Solorzano) at time of COVID testing. You are scheduled for 9/7/21 at 9:15 AM at [facility].     * Continue taking 325 mg aspirin and begin taking Plavix 75 mg daily on 9/3/21. You will remain on these medications for your procedure.     * You should arrive at the [facility] waiting room at the General acute hospital at 6:30 AM. The address is [address] [place] [address]. The phone number is [phone]. A map is enclosed.    * Do not eat after Midnight; you may drink clear liquids (includes water, Jell-O, clear broth, apple juice or any non-carbonated beverage that you can see through) until 6:00 AM.     * You may take your medications with a sip of water the morning of the procedure.     * You will stay overnight at the hospital for observation after the procedure. We aim to discharge you by 11:00 AM the following day. Please have your ride available by 10:00 AM.    PLEASE NOTE our COVID-19 visitors policy: For the protection of our patients and visitors, patients are allowed one consistent visitor, 18 years of age or older, per adult patient in the hospital. Visitors must wear a mask at all times while in the hospital.     All discharge instructions will be given to the  or volunteer. Documentation for the post-operative plan will be given to the patient and . Patients are required to have someone to stay with them for 24 hours after their procedure.    If you have  questions regarding your procedure, please contact me at [phone], option 3.    If you need to cancel, reschedule or have procedure scheduling related questions, please call Alma at [phone].    Thank you,  Raimundo Hurtado, RN, CNRN  Stroke & Endovascular Care Coordinator

## 2021-08-24 ENCOUNTER — DOCUMENTATION ONLY (OUTPATIENT)
Dept: LAB | Facility: CLINIC | Age: 61
End: 2021-08-24

## 2021-08-24 NOTE — PROGRESS NOTES
Patient has lab only appt 9/7/21. One of the orders put in on 8/6/21 has an expected date of 2/2/22 . If you want this done sooner, please change the expected date or it will not get done on 9/7/21    Thanks,   Catrina simons

## 2021-08-27 ENCOUNTER — TELEPHONE (OUTPATIENT)
Dept: FAMILY MEDICINE | Facility: CLINIC | Age: 61
End: 2021-08-27

## 2021-08-27 NOTE — TELEPHONE ENCOUNTER
Reason for Call:  Form, our goal is to have forms completed with 72 hours, however, some forms may require a visit or additional information.    Type of letter, form or note:  medical    Who is the form from?: Patient    Where did the form come from: Patient or family brought in       What clinic location was the form placed at?: [facility]    Where the form was placed:  Box/Folder    What number is listed as a contact on the form?: [phone]       Additional comments: Disability parking pass has fading certificate numbers and would like to get a new one/renew his pass. The current parking pass expires in December.     Call taken on 8/27/2021 at 3:37 PM by Jordana Tamayo

## 2021-09-07 ENCOUNTER — LAB (OUTPATIENT)
Dept: LAB | Facility: CLINIC | Age: 61
End: 2021-09-07
Payer: COMMERCIAL

## 2021-09-07 DIAGNOSIS — I65.21 STENOSIS OF RIGHT CAROTID ARTERY WITHOUT CEREBRAL INFARCTION: ICD-10-CM

## 2021-09-07 DIAGNOSIS — Z11.59 ENCOUNTER FOR SCREENING FOR OTHER VIRAL DISEASES: ICD-10-CM

## 2021-09-07 LAB
ANION GAP SERPL CALCULATED.3IONS-SCNC: 4 MMOL/L (ref 3–14)
APTT PPP: 33 SECONDS (ref 22–38)
BASOPHILS # BLD AUTO: 0 10E3/UL (ref 0–0.2)
BASOPHILS NFR BLD AUTO: 0 %
BUN SERPL-MCNC: 21 MG/DL (ref 7–30)
CALCIUM SERPL-MCNC: 8.9 MG/DL (ref 8.5–10.1)
CHLORIDE BLD-SCNC: 107 MMOL/L (ref 94–109)
CO2 SERPL-SCNC: 27 MMOL/L (ref 20–32)
CREAT SERPL-MCNC: 0.84 MG/DL (ref 0.66–1.25)
EOSINOPHIL # BLD AUTO: 0.1 10E3/UL (ref 0–0.7)
EOSINOPHIL NFR BLD AUTO: 2 %
ERYTHROCYTE [DISTWIDTH] IN BLOOD BY AUTOMATED COUNT: 12.4 % (ref 10–15)
GFR SERPL CREATININE-BSD FRML MDRD: >90 ML/MIN/1.73M2
GLUCOSE BLD-MCNC: 108 MG/DL (ref 70–99)
HCT VFR BLD AUTO: 46.5 % (ref 40–53)
HGB BLD-MCNC: 15 G/DL (ref 13.3–17.7)
INR PPP: 1.05 (ref 0.85–1.15)
LYMPHOCYTES # BLD AUTO: 1.3 10E3/UL (ref 0.8–5.3)
LYMPHOCYTES NFR BLD AUTO: 17 %
MCH RBC QN AUTO: 29.4 PG (ref 26.5–33)
MCHC RBC AUTO-ENTMCNC: 32.3 G/DL (ref 31.5–36.5)
MCV RBC AUTO: 91 FL (ref 78–100)
MONOCYTES # BLD AUTO: 0.7 10E3/UL (ref 0–1.3)
MONOCYTES NFR BLD AUTO: 9 %
NEUTROPHILS # BLD AUTO: 5.4 10E3/UL (ref 1.6–8.3)
NEUTROPHILS NFR BLD AUTO: 72 %
PLATELET # BLD AUTO: 124 10E3/UL (ref 150–450)
POTASSIUM BLD-SCNC: 4 MMOL/L (ref 3.4–5.3)
RBC # BLD AUTO: 5.1 10E6/UL (ref 4.4–5.9)
SARS-COV-2 RNA RESP QL NAA+PROBE: NEGATIVE
SODIUM SERPL-SCNC: 138 MMOL/L (ref 133–144)
WBC # BLD AUTO: 7.5 10E3/UL (ref 4–11)

## 2021-09-07 PROCEDURE — U0005 INFEC AGEN DETEC AMPLI PROBE: HCPCS

## 2021-09-07 PROCEDURE — 85610 PROTHROMBIN TIME: CPT

## 2021-09-07 PROCEDURE — U0003 INFECTIOUS AGENT DETECTION BY NUCLEIC ACID (DNA OR RNA); SEVERE ACUTE RESPIRATORY SYNDROME CORONAVIRUS 2 (SARS-COV-2) (CORONAVIRUS DISEASE [COVID-19]), AMPLIFIED PROBE TECHNIQUE, MAKING USE OF HIGH THROUGHPUT TECHNOLOGIES AS DESCRIBED BY CMS-2020-01-R: HCPCS

## 2021-09-07 PROCEDURE — 85730 THROMBOPLASTIN TIME PARTIAL: CPT

## 2021-09-07 PROCEDURE — 80048 BASIC METABOLIC PNL TOTAL CA: CPT

## 2021-09-07 PROCEDURE — 36415 COLL VENOUS BLD VENIPUNCTURE: CPT

## 2021-09-07 PROCEDURE — 85025 COMPLETE CBC W/AUTO DIFF WBC: CPT

## 2021-09-10 ENCOUNTER — APPOINTMENT (OUTPATIENT)
Dept: MEDSURG UNIT | Facility: CLINIC | Age: 61
End: 2021-09-10
Attending: NEUROLOGICAL SURGERY
Payer: COMMERCIAL

## 2021-09-10 ENCOUNTER — APPOINTMENT (OUTPATIENT)
Dept: INTERVENTIONAL RADIOLOGY/VASCULAR | Facility: CLINIC | Age: 61
End: 2021-09-10
Attending: NEUROLOGICAL SURGERY
Payer: COMMERCIAL

## 2021-09-10 ENCOUNTER — HOSPITAL ENCOUNTER (OUTPATIENT)
Facility: CLINIC | Age: 61
Discharge: HOME | End: 2021-09-10
Attending: NEUROLOGICAL SURGERY | Admitting: NEUROLOGICAL SURGERY
Payer: COMMERCIAL

## 2021-09-10 VITALS
SYSTOLIC BLOOD PRESSURE: 98 MMHG | DIASTOLIC BLOOD PRESSURE: 56 MMHG | HEART RATE: 80 BPM | TEMPERATURE: 97.5 F | RESPIRATION RATE: 20 BRPM | OXYGEN SATURATION: 91 %

## 2021-09-10 DIAGNOSIS — H54.60 MONOCULAR VISION LOSS: ICD-10-CM

## 2021-09-10 DIAGNOSIS — I65.21 STENOSIS OF RIGHT CAROTID ARTERY WITHOUT CEREBRAL INFARCTION: ICD-10-CM

## 2021-09-10 LAB — PA ADP BLD-ACNC: 94 PRU

## 2021-09-10 PROCEDURE — 99152 MOD SED SAME PHYS/QHP 5/>YRS: CPT

## 2021-09-10 PROCEDURE — 85576 BLOOD PLATELET AGGREGATION: CPT | Mod: TC

## 2021-09-10 PROCEDURE — 272N000192 HC ACCESSORY CR2

## 2021-09-10 PROCEDURE — C1760 CLOSURE DEV, VASC: HCPCS

## 2021-09-10 PROCEDURE — 255N000002 HC RX 255 OP 636: Performed by: NEUROLOGICAL SURGERY

## 2021-09-10 PROCEDURE — 250N000009 HC RX 250

## 2021-09-10 PROCEDURE — 99153 MOD SED SAME PHYS/QHP EA: CPT

## 2021-09-10 PROCEDURE — 272N000572 HC SHEATH CR9

## 2021-09-10 PROCEDURE — 36222 PLACE CATH CAROTID/INOM ART: CPT | Mod: RT | Performed by: NEUROLOGICAL SURGERY

## 2021-09-10 PROCEDURE — 36415 COLL VENOUS BLD VENIPUNCTURE: CPT | Performed by: STUDENT IN AN ORGANIZED HEALTH CARE EDUCATION/TRAINING PROGRAM

## 2021-09-10 PROCEDURE — 258N000003 HC RX IP 258 OP 636

## 2021-09-10 PROCEDURE — C1769 GUIDE WIRE: HCPCS

## 2021-09-10 PROCEDURE — 76937 US GUIDE VASCULAR ACCESS: CPT | Mod: 26 | Performed by: NEUROLOGICAL SURGERY

## 2021-09-10 PROCEDURE — 99152 MOD SED SAME PHYS/QHP 5/>YRS: CPT | Mod: GC | Performed by: NEUROLOGICAL SURGERY

## 2021-09-10 PROCEDURE — 250N000011 HC RX IP 250 OP 636

## 2021-09-10 PROCEDURE — 36223 PLACE CATH CAROTID/INOM ART: CPT

## 2021-09-10 PROCEDURE — C1887 CATHETER, GUIDING: HCPCS

## 2021-09-10 PROCEDURE — 272N000566 HC SHEATH CR3

## 2021-09-10 PROCEDURE — 999N000142 HC STATISTIC PROCEDURE PREP ONLY

## 2021-09-10 PROCEDURE — 272N000506 HC NEEDLE CR6

## 2021-09-10 RX ORDER — NALOXONE HYDROCHLORIDE 0.4 MG/ML
0.4 INJECTION, SOLUTION INTRAMUSCULAR; INTRAVENOUS; SUBCUTANEOUS
Status: DISCONTINUED | OUTPATIENT
Start: 2021-09-10 | End: 2021-09-10 | Stop reason: HOSPADM

## 2021-09-10 RX ORDER — IODIXANOL 320 MG/ML
150 INJECTION, SOLUTION INTRAVASCULAR ONCE
Status: COMPLETED | OUTPATIENT
Start: 2021-09-10 | End: 2021-09-10

## 2021-09-10 RX ORDER — NALOXONE HYDROCHLORIDE 0.4 MG/ML
0.2 INJECTION, SOLUTION INTRAMUSCULAR; INTRAVENOUS; SUBCUTANEOUS
Status: DISCONTINUED | OUTPATIENT
Start: 2021-09-10 | End: 2021-09-10 | Stop reason: HOSPADM

## 2021-09-10 RX ORDER — ONDANSETRON 4 MG/1
4 TABLET, ORALLY DISINTEGRATING ORAL EVERY 6 HOURS PRN
Status: DISCONTINUED | OUTPATIENT
Start: 2021-09-10 | End: 2021-09-10 | Stop reason: HOSPADM

## 2021-09-10 RX ORDER — ONDANSETRON 2 MG/ML
4 INJECTION INTRAMUSCULAR; INTRAVENOUS EVERY 6 HOURS PRN
Status: DISCONTINUED | OUTPATIENT
Start: 2021-09-10 | End: 2021-09-10 | Stop reason: HOSPADM

## 2021-09-10 RX ORDER — SODIUM CHLORIDE 9 MG/ML
INJECTION, SOLUTION INTRAVENOUS CONTINUOUS
Status: DISCONTINUED | OUTPATIENT
Start: 2021-09-10 | End: 2021-09-10 | Stop reason: HOSPADM

## 2021-09-10 RX ORDER — FLUMAZENIL 0.1 MG/ML
0.2 INJECTION, SOLUTION INTRAVENOUS
Status: DISCONTINUED | OUTPATIENT
Start: 2021-09-10 | End: 2021-09-10 | Stop reason: HOSPADM

## 2021-09-10 RX ORDER — LIDOCAINE 40 MG/G
CREAM TOPICAL
Status: DISCONTINUED | OUTPATIENT
Start: 2021-09-10 | End: 2021-09-10 | Stop reason: HOSPADM

## 2021-09-10 RX ORDER — PROCHLORPERAZINE 25 MG
25 SUPPOSITORY, RECTAL RECTAL EVERY 12 HOURS PRN
Status: DISCONTINUED | OUTPATIENT
Start: 2021-09-10 | End: 2021-09-10 | Stop reason: HOSPADM

## 2021-09-10 RX ORDER — FENTANYL CITRATE 50 UG/ML
25-50 INJECTION, SOLUTION INTRAMUSCULAR; INTRAVENOUS EVERY 5 MIN PRN
Status: DISCONTINUED | OUTPATIENT
Start: 2021-09-10 | End: 2021-09-10 | Stop reason: HOSPADM

## 2021-09-10 RX ORDER — PROCHLORPERAZINE MALEATE 10 MG
10 TABLET ORAL EVERY 6 HOURS PRN
Status: DISCONTINUED | OUTPATIENT
Start: 2021-09-10 | End: 2021-09-10 | Stop reason: HOSPADM

## 2021-09-10 RX ORDER — HEPARIN SODIUM 200 [USP'U]/100ML
1 INJECTION, SOLUTION INTRAVENOUS CONTINUOUS PRN
Status: DISCONTINUED | OUTPATIENT
Start: 2021-09-10 | End: 2021-09-10 | Stop reason: HOSPADM

## 2021-09-10 RX ADMIN — MIDAZOLAM 1 MG: 1 INJECTION INTRAMUSCULAR; INTRAVENOUS at 08:53

## 2021-09-10 RX ADMIN — HEPARIN SODIUM 4 BAG: 200 INJECTION, SOLUTION INTRAVENOUS at 08:45

## 2021-09-10 RX ADMIN — MIDAZOLAM 0.5 MG: 1 INJECTION INTRAMUSCULAR; INTRAVENOUS at 09:11

## 2021-09-10 RX ADMIN — LIDOCAINE HYDROCHLORIDE 5 ML: 10 INJECTION, SOLUTION EPIDURAL; INFILTRATION; INTRACAUDAL; PERINEURAL at 08:42

## 2021-09-10 RX ADMIN — IODIXANOL 40 ML: 320 INJECTION, SOLUTION INTRAVASCULAR at 09:29

## 2021-09-10 RX ADMIN — SODIUM CHLORIDE: 9 INJECTION, SOLUTION INTRAVENOUS at 07:33

## 2021-09-10 RX ADMIN — FENTANYL CITRATE 25 MCG: 50 INJECTION, SOLUTION INTRAMUSCULAR; INTRAVENOUS at 09:11

## 2021-09-10 RX ADMIN — MIDAZOLAM 1 MG: 1 INJECTION INTRAMUSCULAR; INTRAVENOUS at 08:45

## 2021-09-10 RX ADMIN — FENTANYL CITRATE 50 MCG: 50 INJECTION, SOLUTION INTRAMUSCULAR; INTRAVENOUS at 08:53

## 2021-09-10 RX ADMIN — FENTANYL CITRATE 50 MCG: 50 INJECTION, SOLUTION INTRAMUSCULAR; INTRAVENOUS at 08:45

## 2021-09-10 ASSESSMENT — VISUAL ACUITY
OU: NORMAL ACUITY

## 2021-09-10 NOTE — DISCHARGE INSTRUCTIONS
[facility]         Interventional Radiology  Discharge Instructions Post Angiogram (NEURO)    AFTER YOU GO HOME  ? If you were given sedation, for the first 24 hours: we recommend that an adult stay with you, DO NOT drive or operate machinery at home or at work, DO NOT smoke, DO NOT make any important or legal decisions.  ? DO NOT drink alcoholic beverages the day of your procedure  ? DO relax and take it easy for 24 hours  ? DO drink plenty of fluids  ? DO resume your regular diet, unless otherwise instructed by your Primary Physician  ? DO NOT take a shower for at least 12 hours following your procedure. No tub bath, hot tubs, or swimming for 5 days. Do NOT scrub the procedure site vigorously for 5 days.      Care of groin site  It is normal to have a small bruise or lump at the site.    For the first 2 days, when you cough, sneeze or move your bowels, hold your hand over the puncture site and press gently.    Do NOT lift more than 10 pounds or do any strenuous exercise for at least 3 to 5 days.    Do not use lotion or powder near the puncture site for 3 days.  If you start bleeding from the site in your groin: lie down flat and press firmly on the site. Call your doctor as soon as you can.   Call 911 right away if you have: Heavy bleeding, bleeding that does not stop.        CALL THE PHYSICIAN IF:  ? You start bleeding from the procedure site.  ? You have numbness, coolness or change in color of the right let  ? You experience changes in your vision, hearing, balance, coordination, speech, thinking or memory  ? You experience weakness in one or more extremity  ? You experience pain or redness at the puncture site  ? You develop nausea or vomiting  ? You develop hives or a rash or unexplained itching  ? You develop a temperature of 101 degrees F or greater      ADDITIONAL INSTRUCTIONS  ? Instruction booklet given: Exoseal Device    [facility] INTERVENTIONAL RADIOLOGY DEPARTMENT  Procedure Physician:          Dr. Kaye            Date of procedure: September 10, 2021  Telephone Numbers: [phone] Monday-Friday 8:00 am to 4:30 pm  [phone] After 4:30 pm Monday-Friday, Weekends & Holidays.   Ask for the Neuro-Interventional doctor on call.  Someone is on call 24 hrs/day  [facility] toll free number: [phone] Monday-Friday 8:00 am to 4:30 pm  [facility] Emergency Dept: [phone]

## 2021-09-10 NOTE — PROGRESS NOTES
Patient Name: Otf Bolaños  Medical Record Number: [id number]  Today's Date: 9/10/2021    Procedure: Diagnostic Cerebral Angiogram with possible Carotid Artery Stenting  Proceduralist: Dr. Solorzano, Dr. Washington And Dr. Kaye    Sedation start time: 0841  Sedation end time: 0913  Sedation medications administered: 2.5 mg Versed & 125 mcg Fentanyl   Total sedation time: 32 minutes of sedation time    Report given to: 2A RN  : N/A      Patient transferred to table, positioned and prepped per protocol.    Puncture to: Right Groin at  0845       Sheath removed at 0913 Manual pressure held for 5 minutes; Closure device deployed Angio Seal   Groin site appearance: CDI  Pedal pulse assessment:  present      Post Procedure Education:  The following information has been reviewed with patient   1. Maintain bedrest with right lower extremity straight until 1115.   2. Notify nurse immediately if having any bleeding from site, any changes in  sensation, or changes in strength.   3. Notify nurse if you have to go the bathroom, the staff will assist you.   4. Nurses will be doing frequent assessments post procedure, which will include                 checking the pulses in your feet, groin/access site, vital signs, and neuro exam.    5. Please press your call light if you, or your family, have any questions or  concerns regarding your care.    Learner's response to angiogram education: patient needs reinforcement due to sedation.

## 2021-09-10 NOTE — PROCEDURES
[facility]     Endovascular Surgical Neuroradiology Post-Procedure Note    Pre-Procedure Diagnosis:  Right carotid artery stenosis vs. occlusion   Post-Procedure Diagnosis:  Same     Procedure(s):   Diagnostic cervicocerebral angiography    Findings:    Pseudo occlusion of the right internal carotid artery with distal wall collapse     Plan:  Keep right leg flat for 2 hours. Optimize medical management and continue outpatient follow up.     Primary Surgeon: Dr Kaye  Secondary Surgeon:  Not applicable  Secondary Surgeon Review:  None  Fellow:  Dr Jeanine Washington   Additional Assistants:  None     Prior to the start of the procedure and with procedural staff participation, I verbally confirmed: the patient s identity using two indicators, relevant allergies, that the procedure was appropriate and matched the consent or emergent situation, and that the correct equipment/implants were available. Immediately prior to starting the procedure I conducted the Time Out with the procedural staff and re-confirmed the patient s name, procedure, and site/side. (The Joint Commission universal protocol was followed.)  Yes    PRU value: 94    Anesthesia:  Conscious Sedation  Medications:  2.5 mg Versed & 125 mcg Fentanyl   Puncture site:  Right Femoral Artery    Fluoroscopy time (minutes):  7.8  Radiation dose (mGy):  539    Contrast amount (mL):  40     Estimated blood loss (mL):  20    Closure:  Device 5 F Exoseal     Disposition:  Home after recovery.        Sedation Post-Procedure Summary    Sedatives: Fentanyl and Midazolam (Versed)    Vital signs and pulse oximetry were monitored and remained stable throughout the procedure, and sedation was maintained until the procedure was complete.  The patient was monitored by staff until sedation discharge criteria were met.    Patient tolerance:  Patient tolerated the procedure well with no immediate complications.    Time of sedation in  minutes:  32 minutes from beginning to end of physician one to one monitoring.    NEGRO MCCABE MD  Pager:  [phone]

## 2021-09-10 NOTE — PROGRESS NOTES
Pt tolerated oral intake. Discharge instructions reviewed, copy given to pt. Pt tolerated ambulation. Right groin remains f/d/i. Dr. Solorzano here to speak with pt. PIV's dc'd. Pt will discharge home accompanied by daughter.

## 2021-09-10 NOTE — PROGRESS NOTES
[facility]     Endovascular Surgical Neuroradiology Pre-Procedure Note      HPI:  Otf Bolaños is a 61 year old male with a presentation of right-sided amurosis fugax in April 2021.  He fully recovered from his episode of transient visual loss affecting his right eye and a CT cerebral angiogram confirmed occlusion of the right ICA up to the cavernous segment.  There was antegrade flow from the left ICA with adequate collateral circulation.  A follow-up CT cerebral angiogram had revealed minimal flow within the previously occluded right ICA and for consideration for stenting of his ICA. He is currently on aspirin 325mg and 75 mg Plavix.  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    Urine       Surgical History:  Past Surgical History:   Procedure Laterality Date     HC TRACHEOSTOMY, PLANNED      Description: Tracheostomy;  Recorded: 04/07/2014;     TRACHEOSTOMY  2/7/2014    Procedure: TRACHEOSTOMY;  TRACHEOSTOMY;  Surgeon: Myles Treadwell MD;  Location:  OR       Family History:  Family History   Problem Relation Age of Onset     Hypertension Father      Myocardial Infarction Father      Cancer Paternal Grandfather        Social History:  Social History     Socioeconomic History     Marital status:      Spouse name: Not on file     Number of children: Not on file     Years of education: Not on file     Highest education  level: Not on file   Occupational History     Not on file   Tobacco Use     Smoking status: Former Smoker     Packs/day: 2.00     Years: 30.00     Pack years: 60.00     Quit date: 2014     Years since quittin.6     Smokeless tobacco: Never Used   Substance and Sexual Activity     Alcohol use: Not Currently     Drug use: No     Sexual activity: Yes     Partners: Female   Other Topics Concern     Parent/sibling w/ CABG, MI or angioplasty before 65F 55M? Not Asked   Social History Narrative     Not on file     Social Determinants of Health     Financial Resource Strain:      Difficulty of Paying Living Expenses:    Food Insecurity:      Worried About Running Out of Food in the Last Year:      Ran Out of Food in the Last Year:    Transportation Needs:      Lack of Transportation (Medical):      Lack of Transportation (Non-Medical):    Physical Activity:      Days of Exercise per Week:      Minutes of Exercise per Session:    Stress:      Feeling of Stress :    Social Connections:      Frequency of Communication with Friends and Family:      Frequency of Social Gatherings with Friends and Family:      Attends Synagogue Services:      Active Member of Clubs or Organizations:      Attends Club or Organization Meetings:      Marital Status:    Intimate Partner Violence:      Fear of Current or Ex-Partner:      Emotionally Abused:      Physically Abused:      Sexually Abused:        Allergies:  Allergies   Allergen Reactions     Bee Venom Anaphylaxis     Edema at the site of the sting  Other reaction(s): Other (See Comments)  Causes edema at sting site  Edema at the site of the sting       Metoprolol Palpitations     PN: Causes Tachycardia and Bradycardia Flushing and Visual Disturbances     Hydrochlorothiazide      Low potassium    Other reaction(s): Other (see comments)  hypokalemia (low potassium)  PN: hypokalemia (low potassium)         Is there a contrast allergy?  No    Medications:  Medications Prior to  Admission   Medication Sig Dispense Refill Last Dose     albuterol (PROAIR HFA/PROVENTIL HFA/VENTOLIN HFA) 108 (90 Base) MCG/ACT inhaler Inhale 2 puffs into the lungs every 6 hours 1 Inhaler 3 9/10/2021 at Unknown time     aspirin (ASA) 325 MG EC tablet Take 1 tablet (325 mg) by mouth daily 100 tablet 3 9/10/2021 at Unknown time     atorvastatin (LIPITOR) 40 MG tablet Take 1 tablet (40 mg) by mouth every evening 30 tablet 3 9/9/2021 at 2100     clopidogrel (PLAVIX) 75 MG tablet Take 1 tablet (75 mg) by mouth daily 30 tablet 3 9/10/2021 at Unknown time     lisinopril (ZESTRIL) 10 MG tablet TAKE ONE TABLET (10MG) BY MOUTH ONCE DAILY 90 tablet 1 9/10/2021 at Unknown time     multivitamin, therapeutic (THERA-VIT) TABS tablet Take 1 tablet by mouth daily   9/10/2021 at Unknown time     acetaminophen (TYLENOL) 325 MG tablet Take 2 tablets (650 mg) by mouth every 4 hours as needed for mild pain  0      clopidogrel (PLAVIX) 75 MG tablet Take 1 tablet (75 mg) by mouth daily 20 tablet 0      ibuprofen (ADVIL/MOTRIN) 200 MG tablet Take 200-400 mg by mouth every 4 hours as needed        sildenafil (REVATIO) 20 MG tablet Take 2-5 tablets (40-100mg) 30 minutes to 4 hours before sex 30 tablet 0    .    ROS:  The 10 point Review of Systems is negative other than noted in the HPI or here.     PHYSICAL EXAMINATION  Vital Signs:  B/P: 127/65,  T: 97.5,  P: 95,  R: 28    Cardio:  RRR  Pulmonary:  no respiratory distress  Abdomen:  soft    Neurologic  Mental Status:  fully alert  Cranial Nerves:  visual fields intact  Motor:  no abnormal movements  Sensory:  intact/symmetric to light touch and pin prick throughout upper and lower extremities  Coordination:  FNF and HS intact without dysmetria    Pre-procedure National Institutes of Health Stroke Scale:   Not applicable    LABS  (most recent Cr, BUN, GFR, PLT, INR, PTT within the past 7 days):  Recent Labs   Lab 09/07/21  0909   CR 0.84   BUN 21   GFRESTIMATED >90   *   INR 1.05    PTT 33        Platelet Function P2Y12 (PRU):  Awaiting results      ASSESSMENT: Severe stenosis of the right ICA    PLAN: Diagnostic cerebral angiogram plus stenting of right ICA/angioplasty        PRE-PROCEDURE SEDATION ASSESSMENT     Pre-Procedure Sedation Assessment done at 0700.    Expected Level:  Moderate Sedation    Indication:  Sedation is required to allow for neurointerventional procedure.    Consent obtained from patient after discussing the risks, benefits and alternatives.     PO Intake:  Appropriately NPO for procedure    ASA Class:  Class 2 - MILD SYSTEMIC DISEASE, NO ACUTE PROBLEMS, NO FUNCTIONAL LIMITATIONS.    Mallampati:  Grade 2:  Soft palate, base of uvula, tonsillar pillars, and portion of posterior pharyngeal wall visible    History and physical reviewed and no updates needed. I have reviewed the lab findings, diagnostic data, medications, and the plan for sedation. I have determined this patient to be an appropriate candidate for the planned sedation and procedure and have reassessed the patient IMMEDIATELY PRIOR to sedation and procedure.    Patient was discussed with the Attending, Dr. Roberts who could do the:, who agrees with the plan.    Atif Moore MD   Neuro IR fellow  Pager: [phone]

## 2021-09-10 NOTE — PROGRESS NOTES
Pt arrived on 2a post cerebral angiogram. Right groin f/d/i. No pain. VSS Neuros intact. Pt sipping on water at this time.

## 2021-09-15 DIAGNOSIS — G45.9 TIA (TRANSIENT ISCHEMIC ATTACK): ICD-10-CM

## 2021-09-16 RX ORDER — ATORVASTATIN CALCIUM 40 MG/1
TABLET, FILM COATED ORAL
Qty: 30 TABLET | Refills: 2 | Status: SHIPPED | OUTPATIENT
Start: 2021-09-16 | End: 2021-12-08

## 2021-09-17 ENCOUNTER — APPOINTMENT (OUTPATIENT)
Dept: CT IMAGING | Facility: CLINIC | Age: 61
End: 2021-09-17
Attending: EMERGENCY MEDICINE
Payer: COMMERCIAL

## 2021-09-17 ENCOUNTER — HOSPITAL ENCOUNTER (EMERGENCY)
Facility: CLINIC | Age: 61
Discharge: HOME | End: 2021-09-17
Attending: EMERGENCY MEDICINE | Admitting: EMERGENCY MEDICINE
Payer: COMMERCIAL

## 2021-09-17 VITALS
OXYGEN SATURATION: 89 % | HEART RATE: 85 BPM | TEMPERATURE: 98.6 F | RESPIRATION RATE: 23 BRPM | BODY MASS INDEX: 35.31 KG/M2 | SYSTOLIC BLOOD PRESSURE: 127 MMHG | DIASTOLIC BLOOD PRESSURE: 79 MMHG | HEIGHT: 67 IN | WEIGHT: 225 LBS

## 2021-09-17 DIAGNOSIS — R07.9 CHEST PAIN, UNSPECIFIED TYPE: ICD-10-CM

## 2021-09-17 DIAGNOSIS — M54.6 ACUTE LEFT-SIDED THORACIC BACK PAIN: ICD-10-CM

## 2021-09-17 LAB
ANION GAP SERPL CALCULATED.3IONS-SCNC: 6 MMOL/L (ref 3–14)
BASOPHILS # BLD AUTO: 0 10E3/UL (ref 0–0.2)
BASOPHILS NFR BLD AUTO: 0 %
BUN SERPL-MCNC: 12 MG/DL (ref 7–30)
CALCIUM SERPL-MCNC: 10 MG/DL (ref 8.5–10.1)
CHLORIDE BLD-SCNC: 101 MMOL/L (ref 94–109)
CO2 SERPL-SCNC: 29 MMOL/L (ref 20–32)
CREAT SERPL-MCNC: 0.95 MG/DL (ref 0.66–1.25)
D DIMER PPP FEU-MCNC: 0.27 UG/ML FEU (ref 0–0.5)
EOSINOPHIL # BLD AUTO: 0.2 10E3/UL (ref 0–0.7)
EOSINOPHIL NFR BLD AUTO: 2 %
ERYTHROCYTE [DISTWIDTH] IN BLOOD BY AUTOMATED COUNT: 12.3 % (ref 10–15)
GFR SERPL CREATININE-BSD FRML MDRD: 85 ML/MIN/1.73M2
GLUCOSE BLD-MCNC: 90 MG/DL (ref 70–99)
HCT VFR BLD AUTO: 47.7 % (ref 40–53)
HGB BLD-MCNC: 15.3 G/DL (ref 13.3–17.7)
IMM GRANULOCYTES # BLD: 0 10E3/UL
IMM GRANULOCYTES NFR BLD: 0 %
LYMPHOCYTES # BLD AUTO: 1.5 10E3/UL (ref 0.8–5.3)
LYMPHOCYTES NFR BLD AUTO: 16 %
MCH RBC QN AUTO: 29.6 PG (ref 26.5–33)
MCHC RBC AUTO-ENTMCNC: 32.1 G/DL (ref 31.5–36.5)
MCV RBC AUTO: 92 FL (ref 78–100)
MONOCYTES # BLD AUTO: 0.8 10E3/UL (ref 0–1.3)
MONOCYTES NFR BLD AUTO: 9 %
NEUTROPHILS # BLD AUTO: 6.8 10E3/UL (ref 1.6–8.3)
NEUTROPHILS NFR BLD AUTO: 73 %
NRBC # BLD AUTO: 0 10E3/UL
NRBC BLD AUTO-RTO: 0 /100
PLATELET # BLD AUTO: 161 10E3/UL (ref 150–450)
POTASSIUM BLD-SCNC: 3.9 MMOL/L (ref 3.4–5.3)
RBC # BLD AUTO: 5.17 10E6/UL (ref 4.4–5.9)
SODIUM SERPL-SCNC: 136 MMOL/L (ref 133–144)
TROPONIN I SERPL-MCNC: <0.015 UG/L (ref 0–0.04)
WBC # BLD AUTO: 9.3 10E3/UL (ref 4–11)

## 2021-09-17 PROCEDURE — 85004 AUTOMATED DIFF WBC COUNT: CPT | Performed by: EMERGENCY MEDICINE

## 2021-09-17 PROCEDURE — 36415 COLL VENOUS BLD VENIPUNCTURE: CPT | Performed by: EMERGENCY MEDICINE

## 2021-09-17 PROCEDURE — 99285 EMERGENCY DEPT VISIT HI MDM: CPT | Mod: 25

## 2021-09-17 PROCEDURE — 85379 FIBRIN DEGRADATION QUANT: CPT | Performed by: EMERGENCY MEDICINE

## 2021-09-17 PROCEDURE — 93005 ELECTROCARDIOGRAM TRACING: CPT

## 2021-09-17 PROCEDURE — 84484 ASSAY OF TROPONIN QUANT: CPT | Performed by: EMERGENCY MEDICINE

## 2021-09-17 PROCEDURE — 80048 BASIC METABOLIC PNL TOTAL CA: CPT | Performed by: EMERGENCY MEDICINE

## 2021-09-17 PROCEDURE — 71260 CT THORAX DX C+: CPT

## 2021-09-17 PROCEDURE — 250N000013 HC RX MED GY IP 250 OP 250 PS 637: Performed by: EMERGENCY MEDICINE

## 2021-09-17 PROCEDURE — 250N000009 HC RX 250: Performed by: EMERGENCY MEDICINE

## 2021-09-17 PROCEDURE — 250N000011 HC RX IP 250 OP 636: Performed by: EMERGENCY MEDICINE

## 2021-09-17 RX ORDER — IOPAMIDOL 755 MG/ML
500 INJECTION, SOLUTION INTRAVASCULAR ONCE
Status: COMPLETED | OUTPATIENT
Start: 2021-09-17 | End: 2021-09-17

## 2021-09-17 RX ORDER — CYCLOBENZAPRINE HCL 10 MG
5-10 TABLET ORAL 3 TIMES DAILY PRN
Qty: 20 TABLET | Refills: 0 | Status: SHIPPED | OUTPATIENT
Start: 2021-09-17 | End: 2022-01-24

## 2021-09-17 RX ORDER — OXYCODONE HYDROCHLORIDE 5 MG/1
10 TABLET ORAL ONCE
Status: COMPLETED | OUTPATIENT
Start: 2021-09-17 | End: 2021-09-17

## 2021-09-17 RX ADMIN — SODIUM CHLORIDE 80 ML: 9 INJECTION, SOLUTION INTRAVENOUS at 14:47

## 2021-09-17 RX ADMIN — OXYCODONE HYDROCHLORIDE 10 MG: 5 TABLET ORAL at 13:17

## 2021-09-17 RX ADMIN — IOPAMIDOL 80 ML: 755 INJECTION, SOLUTION INTRAVENOUS at 14:47

## 2021-09-17 ASSESSMENT — MIFFLIN-ST. JEOR: SCORE: 1774.22

## 2021-09-17 ASSESSMENT — ENCOUNTER SYMPTOMS
NAUSEA: 0
VOMITING: 0
COUGH: 0
FEVER: 0

## 2021-09-17 NOTE — ED PROVIDER NOTES
"  History   Chief Complaint:  Chest Pain and Back Pain       HPI   Otf Bolaños is a 61 year old male with history of hypertension, COPD, cerebral infarction, alcohol abuse, and tobacco abuse who presents with chest pain and back pain. The patient reports that he has been experiencing intermittent episodes of chest pain that radiates to his back for the last four days. These episodes last for about ten minutes in duration. He also mentions that he notices left-sided pain near the back of his shoulder whenever he twists. This sensation seems to radiate down to his chest. Here in the ED, pain is currently minimal. Chest pain is not worse with inhalation. When pain is not severe, it simply feels as if there is a \"bruise\" near his chest. The only known time he exerted himself before onset of pain was when he lifted a mattress about a week ago. Tylenol has been used for symptoms at home. Patient denies pain/swelling in his legs, nausea, vomiting, cough, or fever.        Review of Systems   Constitutional: Negative for fever.   Respiratory: Negative for cough.    Cardiovascular: Positive for chest pain. Negative for leg swelling.   Gastrointestinal: Negative for nausea and vomiting.   All other systems reviewed and are negative.      Allergies:  Bee venom  Metoprolol  Hydrochlorothiazide    Medications:  Albuterol  Aspirin  Atorvastatin  Plavix  Lisinopril  Sildenafil    Past Medical History:    Cerebral infarction  Collapse of left lung  ETOH abuse  Hypertension  Hypokalemia  Low back pain  Mild malnutrition  Lipomatosis  Tobacco abuse  Varicose vein  VRE infection  COPD    Past Surgical History:    Tracheostomy     Family History:    Hypertension, father  MI, father  Cancer, grandfather    Social History:  Arrives via car  Unaccompanied in ED    Physical Exam     Patient Vitals for the past 24 hrs:   BP Temp Temp src Pulse Resp SpO2 Height Weight   09/17/21 1445 -- -- -- 85 23 (!) 89 % -- --   09/17/21 1430 127/79 " "-- -- 87 21 (!) 89 % -- --   09/17/21 1415 134/81 -- -- 84 18 (!) 87 % -- --   09/17/21 1400 135/81 -- -- 84 18 (!) 89 % -- --   09/17/21 1345 129/82 -- -- 80 18 90 % -- --   09/17/21 1330 131/83 -- -- 87 21 94 % -- --   09/17/21 1300 (!) 161/92 -- -- 91 -- 94 % -- --   09/17/21 1212 111/79 98.6  F (37  C) Oral 103 18 97 % 1.702 m (5' 7\") 102.1 kg (225 lb)       Physical Exam  General: obese   Head: The scalp, face, and head appear normal  Eyes: The pupils are equal, round, and reactive to light. Conjunctivae and sclerae are normal  Neck: Normal range of motion.  CV: Regular rate and rhythm. Peripheral pulses including radial pulses are symmetric.   Resp: Lungs are clear without wheezes or rales. No respiratory distress.   GI: Abdomen is soft, no rigidity, guarding, or rebound. No distension. No tenderness to palpation in any quadrant.     MS: Chest wall is tender to palpation.  Tenderness to the left lateral thoracic back which reproduces his tenderness that he presents to today.  No overlying erythema.  No midline thoracic spinal tenderness.. No asymmetric leg swelling, calf or thigh tenderness.    Skin: No rash or lesions noted. Normal capillary refill noted  Neuro: Speech is normal and fluent. Face is symmetric. Moving all extremities.   Psych:  Normal affect.  Appropriate interactions.    Emergency Department Course     Imaging:  CT Aortic Survey w Contrast   Final Result   IMPRESSION:   1.  Atherosclerotic aorta without evidence of aneurysm or dissection.   Coronary and peripheral vascular atherosclerosis also noted.   2.  Cirrhotic-appearing liver.   3.  No acute cause for pain demonstrated.      TEGAN HOWELL MD            SYSTEM ID:  [id number]        Laboratory:    Ddimer: 0.27    BMP: AWNL (Creatinine 0.95)    Troponin (Collected 1301): <0.015     CBC: WBC 9.3, HGB 15.3,       Emergency Department Course:    Reviewed:  I reviewed nursing notes, vitals, past medical history and care " everywhere    Assessments:  1306 I obtained history and examined the patient as noted above.   1520 I rechecked the patient and explained findings.     Interventions:  1317 Oxycodone 10 mg PO    Disposition:  The patient was discharged to home.     Impression & Plan     Medical Decision Making:  This is a 61-year-old gentleman with past medical history of COPD presents emergency department with chest and back pain.  Upon initial evaluation he is hypertensive but otherwise hemodynamically stable.  Patient is mildly hypoxic however he reports that this is baseline secondary to COPD.  He has oxygen at the bedside but does not want to use it currently.  Broad work-up was initiated to investigate the patient's chest and back pain.  EKG was obtained which does not show any acute signs of ischemia nor dysrhythmia.  Initial troponin is negative.  After nearly constant symptoms for few days this seems unlikely to represent ACS given his physical exam, history and work-up here in the emergency department.  Aortic dissection was also considered therefore CT aorta was obtained.  Thankfully this does not reveal any signs of aneurysm or dissection.  The remainder of the CT imaging was unrevealing into the cause of his chest and back pain.  D-dimer was obtained and was negative.  Less likely to represent PE.  At this point I feel that this most likely represents musculoskeletal pain.  We will treat accordingly and have him follow-up closely with his primary care physician.          Diagnosis:    ICD-10-CM    1. Chest pain, unspecified type  R07.9    2. Acute left-sided thoracic back pain  M54.6        Discharge Medications:  Discharge Medication List as of 9/17/2021  3:35 PM      START taking these medications    Details   cyclobenzaprine (FLEXERIL) 10 MG tablet Take 0.5-1 tablets (5-10 mg) by mouth 3 times daily as needed for muscle spasms, Disp-20 tablet, R-0, Local Print             Scribe Disclosure:  Gato STREETER, am  serving as a scribe at 1:07 PM on 9/17/2021 to document services personally performed by Tevin Daniel MD based on my observations and the provider's statements to me.              Tevin Daniel MD  09/18/21 0652

## 2021-09-17 NOTE — ED TRIAGE NOTES
Pt presents today with intermittent L sided chest and back pain that started Monday with no precipitating factors. Pt has hx of COPD, pt has nasal cannula with oxygen with him, but does not have it turned on currently. O2 sat 97% on room air. Pt had zio patch study done in July with no known results per pt report. Per pt had angiogram last Friday for clot in neck. ABCs intact. A & O x4.

## 2021-09-30 ENCOUNTER — TELEPHONE (OUTPATIENT)
Dept: NEUROLOGY | Facility: CLINIC | Age: 61
End: 2021-09-30
Payer: COMMERCIAL

## 2021-09-30 NOTE — TELEPHONE ENCOUNTER
9/30 spoke with pt unable to schedule at the moment, will call back. Please schedule virtual follow-up with Dr. Iqbal in one month. May use BRAD slot per Raimundo.

## 2021-09-30 NOTE — TELEPHONE ENCOUNTER
----- Message from Charissa Hurtado RN sent at 9/30/2021 11:54 AM CDT -----  Please contact patient to schedule virtual follow-up with Dr. Iqbal in one month. May use BRAD slot.    ThanksRaimundo

## 2021-10-05 ENCOUNTER — TELEPHONE (OUTPATIENT)
Dept: FAMILY MEDICINE | Facility: CLINIC | Age: 61
End: 2021-10-05

## 2021-10-05 NOTE — TELEPHONE ENCOUNTER
Pt calls.    He is concerned.  He developed 2 bruises on his left arm. He is wondering if this could be caused by plavix.  Advised plavix can cause bruising.  He is also on an ASA. Advised both of those can cause bruising.  Advised to continue to monitor and call if concerns with black stools, nose bleeds that won't stop or things like this.  Also advised he could check with Neurology if further concerns.

## 2021-11-09 ENCOUNTER — VIRTUAL VISIT (OUTPATIENT)
Dept: NEUROLOGY | Facility: CLINIC | Age: 61
End: 2021-11-09
Payer: COMMERCIAL

## 2021-11-09 DIAGNOSIS — I65.21 CAROTID OCCLUSION, RIGHT: Primary | ICD-10-CM

## 2021-11-09 DIAGNOSIS — G45.3 AMAUROSIS FUGAX: ICD-10-CM

## 2021-11-09 PROCEDURE — 99442 PR PHYSICIAN TELEPHONE EVALUATION 11-20 MIN: CPT | Mod: 95 | Performed by: PSYCHIATRY & NEUROLOGY

## 2021-11-09 NOTE — PATIENT INSTRUCTIONS
1. No follow up with me needed but please contact me via ZelnasT if there are questions  2.  No atrial fibrillation was found on ziopatch and this is good news. Please discuss intermittent fast heart rate on Ziopatch with primary care.  3. Please have lab draw for aspirin sensitivity  4. Avoid dehydration

## 2021-11-09 NOTE — LETTER
"11/9/2021       RE: Otf Bolaños  [address] [place] [address]     Dear Colleague,    Thank you for referring your patient, Otf Bolaños, to the [facility] NEUROLOGY CL[facility] [place] at [facility]. Please see a copy of my visit note below.    November 9, 2021    Otf Bolaños                                                                                                                     [address] [place] [address]    Total Phone Call: 11:05 to 11:23 AM (18 minutes)  Documentation, care coordination, review -25 minutes  TOTAL 43 Minutes      Mr. Bolaños is a 61 year old gentleman seen again via a telephone visit. My prior visit with this gentleman was in July 2021 and details are described in my note from that date. To summarize, this gentleman was hospitalized around 4/29/21 with transient visual symptoms. This was originally described as \"darkening of vision\" in his R eye. Today, the gentleman confirms this and states that it was a dark shade of purple. The symptoms lasted for about 20-30 minutes. His wife drove him to the hospital and a CTA showed an occluded R ICA. The R ophthalmic artery was patent.  This occlusion was new since a scan from 9/24/2015. The patient subsequently had a repeat CTA on 7/13/21. This appeared to showrecanalization but with persistent high grade stenosis of the R ICA. At the last visit, I had ordered an angiogram as a follow up. This was done by Dr. Kaye's team and confirmed the R ICA occlusion. There were collaterals established via the R external carotid. At the last visit, I had ordered a ziopatch to r/o AFIB. There was no AFIB noted though there were episodes of SVT. Patient is advised to discuss this with primary care.    Due to carotid occlusion, I reviewed some of the imaging studies for evidence of atherosclerosis in other vascular beds. He does have atherosclerosis in the " iliac arteries. This was discussed with patient. There is no h/o MI. At the visit with Dr. Kaye, sensitivity for plavix was tested and he was started on Plavix after the results showed that he was sensitive. No aspirin sensitivity was done. I ordered this today after discussion with patient. Earlier in July he was only taking 325 mg of aspirin. I will d/w Dr. Kaye as to why Plavix was added.    The patient had a TTE which was unremarkable but there was no bubble study done. This was ordered last time but patient has not done this yet. He is on a statin and Lisinopril. We did a complete review of stroke symptoms and the patient denies any stroke symptoms.       Angiogram done on 9/10/21  Right common carotid artery injection: Cervical view  Biplane angiography was performed over the neck. Cervical view of the  right common carotid artery in the AP and lateral projections  demonstrates a normal right common carotid artery. The common carotid  artery bifurcation is at the C3-4 level. The right external carotid  artery and its branches are within normal limits. There is no  significant stenosis. The right internal carotid artery is occluded 7  mm distal to the carotid bifurcation. There is robust collateral flow  through the right external carotid artery via its anterior ethmoidal  branches that reconstitute the right opthalmic artery with retrograde  filling of the right cavernous carotid artery and causing retrograde  flow into the laceral, petrous and cervical segments of the right  internal carotid artery. The retrograde flow stops about 3 mm distal  to the occlusion of the anterograde flow (best seen on series#7).     ASSESSMENT / PLAN  Encounter Diagnoses   Name Primary?     Carotid occlusion, right Yes     Amaurosis fugax       Orders Placed This Encounter   Procedures     Platelet Function ASA       INSTRUCTIONS  1. No follow up with me needed but please contact me via DigicompanionT if there are questions  2.  No  atrial fibrillation was found on ziopatch and this is good news. Please discuss intermittent fast heart rate on Ziopatch with primary care.  3. Please have lab draw for aspirin sensitivity  4. Avoid dehydration    Current Outpatient Medications   Medication     acetaminophen (TYLENOL) 325 MG tablet     albuterol (PROAIR HFA/PROVENTIL HFA/VENTOLIN HFA) 108 (90 Base) MCG/ACT inhaler     aspirin (ASA) 325 MG EC tablet     atorvastatin (LIPITOR) 40 MG tablet     clopidogrel (PLAVIX) 75 MG tablet     clopidogrel (PLAVIX) 75 MG tablet     cyclobenzaprine (FLEXERIL) 10 MG tablet     ibuprofen (ADVIL/MOTRIN) 200 MG tablet     lisinopril (ZESTRIL) 10 MG tablet     multivitamin, therapeutic (THERA-VIT) TABS tablet     sildenafil (REVATIO) 20 MG tablet     No current facility-administered medications for this visit.     Social History     Tobacco Use     Smoking status: Former Smoker     Packs/day: 2.00     Years: 30.00     Pack years: 60.00     Quit date: 2014     Years since quittin.8     Smokeless tobacco: Never Used   Substance Use Topics     Alcohol use: Not Currently     Drug use: No         CTA 21  IMPRESSION:      HEAD CTA:   1.  Occlusion of the right internal carotid artery at the skull base with reconstitution of this vessel presumably via an intact [place]. There is evidence for asymmetrically reduced flow within the supraclinoid right ICA segment and proximal   right anterior circulation branches.  2.  No additional proximal branch occlusion. Specifically, the right ophthalmic artery remains patent.  3.  No aneurysm or high flow vascular malformation.     NECK CTA:  1.  Occlusion of the right internal carotid artery just beyond its origin. This is new compared to a neck CT from 2015 and may be acute to subacute in nature.  2.  No hemodynamically significant stenosis in the remaining cervical vessels.     CTA 21  IMPRESSION:  1. Interval recanalization of the previously occluded  right internal  carotid artery with persistent high-grade stenosis of the origin and a  thin column of contrast throughout the cervical and intracranial right  internal carotid artery.  2. Severe emphysematous change in the upper lungs bilaterally again  noted.  3. Otherwise, normal neck and head CTA.    Brain MRI     Brain MRI 4/30/21                                                                   IMPRESSION:  Unremarkable brain MRI without evidence of acute infarct,  mass, hemorrhage, or herniation.        TTE     Left ventricular systolic function is normal.  The visual ejection fraction is estimated at 55-60%.  No regional wall motion abnormalities noted.  The study was technically difficult. There is no comparison study available.  ______________________________________________________________________________      EXAM  Patient is alert, keenly responsive oriented with normal speech and language and attention. Denies any issues with arms, legs or vision at this time. No stroke related symptoms.    Fanny Iqbal MD        Again, thank you for allowing me to participate in the care of your patient.      Sincerely,    Fanny Iqbal MD

## 2021-11-09 NOTE — PROGRESS NOTES
"[facility] NEUROLOGY CLINIC [address]  3RD FLOOR  [facility] [address] [phone]          November 9, 2021    Otf Bolaños                                                                                                                     [address] [place] [address]    Total Phone Call: 11:05 to 11:23 AM (18 minutes)  Documentation, care coordination, review -25 minutes  TOTAL 43 Minutes      Mr. Bolaños is a 61 year old gentleman seen again via a telephone visit. My prior visit with this gentleman was in July 2021 and details are described in my note from that date. To summarize, this gentleman was hospitalized around 4/29/21 with transient visual symptoms. This was originally described as \"darkening of vision\" in his R eye. Today, the gentleman confirms this and states that it was a dark shade of purple. The symptoms lasted for about 20-30 minutes. His wife drove him to the hospital and a CTA showed an occluded R ICA. The R ophthalmic artery was patent.  This occlusion was new since a scan from 9/24/2015. The patient subsequently had a repeat CTA on 7/13/21. This appeared to showrecanalization but with persistent high grade stenosis of the R ICA. At the last visit, I had ordered an angiogram as a follow up. This was done by Dr. Kaye's team and confirmed the R ICA occlusion. There were collaterals established via the R external carotid. At the last visit, I had ordered a ziopatch to r/o AFIB. There was no AFIB noted though there were episodes of SVT. Patient is advised to discuss this with primary care.    Due to carotid occlusion, I reviewed some of the imaging studies for evidence of atherosclerosis in other vascular beds. He does have atherosclerosis in the iliac arteries. This was discussed with patient. There is no h/o MI. At the visit with Dr. Kaye, sensitivity for plavix was tested and he was started on Plavix after the results showed " that he was sensitive. No aspirin sensitivity was done. I ordered this today after discussion with patient. Earlier in July he was only taking 325 mg of aspirin. I will d/w Dr. Kaye as to why Plavix was added.    The patient had a TTE which was unremarkable but there was no bubble study done. This was ordered last time but patient has not done this yet. He is on a statin and Lisinopril. We did a complete review of stroke symptoms and the patient denies any stroke symptoms.       Angiogram done on 9/10/21  Right common carotid artery injection: Cervical view  Biplane angiography was performed over the neck. Cervical view of the  right common carotid artery in the AP and lateral projections  demonstrates a normal right common carotid artery. The common carotid  artery bifurcation is at the C3-4 level. The right external carotid  artery and its branches are within normal limits. There is no  significant stenosis. The right internal carotid artery is occluded 7  mm distal to the carotid bifurcation. There is robust collateral flow  through the right external carotid artery via its anterior ethmoidal  branches that reconstitute the right opthalmic artery with retrograde  filling of the right cavernous carotid artery and causing retrograde  flow into the laceral, petrous and cervical segments of the right  internal carotid artery. The retrograde flow stops about 3 mm distal  to the occlusion of the anterograde flow (best seen on series#7).     ASSESSMENT / PLAN  Encounter Diagnoses   Name Primary?     Carotid occlusion, right Yes     Amaurosis fugax       Orders Placed This Encounter   Procedures     Platelet Function ASA       INSTRUCTIONS  1. No follow up with me needed but please contact me via PowerFileHART if there are questions  2.  No atrial fibrillation was found on ziopatch and this is good news. Please discuss intermittent fast heart rate on Ziopatch with primary care.  3. Please have lab draw for aspirin  sensitivity  4. Avoid dehydration    Current Outpatient Medications   Medication     acetaminophen (TYLENOL) 325 MG tablet     albuterol (PROAIR HFA/PROVENTIL HFA/VENTOLIN HFA) 108 (90 Base) MCG/ACT inhaler     aspirin (ASA) 325 MG EC tablet     atorvastatin (LIPITOR) 40 MG tablet     clopidogrel (PLAVIX) 75 MG tablet     clopidogrel (PLAVIX) 75 MG tablet     cyclobenzaprine (FLEXERIL) 10 MG tablet     ibuprofen (ADVIL/MOTRIN) 200 MG tablet     lisinopril (ZESTRIL) 10 MG tablet     multivitamin, therapeutic (THERA-VIT) TABS tablet     sildenafil (REVATIO) 20 MG tablet     No current facility-administered medications for this visit.     Social History     Tobacco Use     Smoking status: Former Smoker     Packs/day: 2.00     Years: 30.00     Pack years: 60.00     Quit date: 2014     Years since quittin.8     Smokeless tobacco: Never Used   Substance Use Topics     Alcohol use: Not Currently     Drug use: No         CTA 21  IMPRESSION:      HEAD CTA:   1.  Occlusion of the right internal carotid artery at the skull base with reconstitution of this vessel presumably via an intact [place]. There is evidence for asymmetrically reduced flow within the supraclinoid right ICA segment and proximal   right anterior circulation branches.  2.  No additional proximal branch occlusion. Specifically, the right ophthalmic artery remains patent.  3.  No aneurysm or high flow vascular malformation.     NECK CTA:  1.  Occlusion of the right internal carotid artery just beyond its origin. This is new compared to a neck CT from 2015 and may be acute to subacute in nature.  2.  No hemodynamically significant stenosis in the remaining cervical vessels.     CTA 21  IMPRESSION:  1. Interval recanalization of the previously occluded right internal  carotid artery with persistent high-grade stenosis of the origin and a  thin column of contrast throughout the cervical and intracranial right  internal carotid  artery.  2. Severe emphysematous change in the upper lungs bilaterally again  noted.  3. Otherwise, normal neck and head CTA.    Brain MRI     Brain MRI 4/30/21                                                                   IMPRESSION:  Unremarkable brain MRI without evidence of acute infarct,  mass, hemorrhage, or herniation.        TTE     Left ventricular systolic function is normal.  The visual ejection fraction is estimated at 55-60%.  No regional wall motion abnormalities noted.  The study was technically difficult. There is no comparison study available.  ______________________________________________________________________________      EXAM  Patient is alert, keenly responsive oriented with normal speech and language and attention. Denies any issues with arms, legs or vision at this time. No stroke related symptoms.    Fanny Iqbal MD

## 2021-11-09 NOTE — PROGRESS NOTES
Otf is a 61 year old who is being evaluated via a billable telephone visit.      What phone number would you like to be contacted at? [phone]  How would you like to obtain your AVS? Tarun  Phone call duration: 18  minutes

## 2021-11-19 ENCOUNTER — LAB (OUTPATIENT)
Dept: LAB | Facility: CLINIC | Age: 61
End: 2021-11-19
Payer: COMMERCIAL

## 2021-11-19 DIAGNOSIS — I65.21 CAROTID OCCLUSION, RIGHT: ICD-10-CM

## 2021-11-19 LAB — PA AA BLD-ACNC: 385 ARU

## 2021-11-19 PROCEDURE — 36415 COLL VENOUS BLD VENIPUNCTURE: CPT

## 2021-11-19 PROCEDURE — 85576 BLOOD PLATELET AGGREGATION: CPT

## 2021-12-04 DIAGNOSIS — G45.9 TIA (TRANSIENT ISCHEMIC ATTACK): ICD-10-CM

## 2021-12-08 RX ORDER — ATORVASTATIN CALCIUM 40 MG/1
TABLET, FILM COATED ORAL
Qty: 30 TABLET | Refills: 1 | Status: SHIPPED | OUTPATIENT
Start: 2021-12-08 | End: 2022-01-24

## 2021-12-08 NOTE — TELEPHONE ENCOUNTER
Prescription approved per [facility] Refill Protocol - this is for atorvastatin     Will forward to the station, pt is due for a med check.  Please help him schedule.

## 2021-12-20 ENCOUNTER — NURSE TRIAGE (OUTPATIENT)
Dept: FAMILY MEDICINE | Facility: CLINIC | Age: 61
End: 2021-12-20
Payer: COMMERCIAL

## 2021-12-20 NOTE — TELEPHONE ENCOUNTER
"Pt will monitor for now.  Recommended Vaseline in the nose and humidifier in the home due to dryness.  CEDRICI to PCP     Pt did make a follow up med check for January    Abdias MCDANIEL RN, BSN      Reason for Disposition    Mild-moderate nosebleed and bleeding has stopped now    Answer Assessment - Initial Assessment Questions  1. AMOUNT OF BLEEDING: \"How bad is the bleeding?\" \"How much blood was lost?\" \"Has the bleeding stopped?\"    - MILD: needed a couple tissues    - MODERATE: needed many tissues    - SEVERE: large blood clots, soaked many tissues, lasted more than 30 minutes       mild  2. ONSET: \"When did the nosebleed start?\"       yesterday  3. FREQUENCY: \"How many nosebleeds have you had in the last 24 hours?\"       Only occurred when he was up and moving, no more than 10 times, lasting only a few minutes  4. RECURRENT SYMPTOMS: \"Have there been other recent nosebleeds?\" If so, ask: \"How long did it take you to stop the bleeding?\" \"What worked best?\"       no  5. CAUSE: \"What do you think caused this nosebleed?\"      unsure  6. LOCAL FACTORS: \"Do you have any cold symptoms?\", \"Have you been rubbing or picking at your nose?\"      no  7. SYSTEMIC FACTORS: \"Do you have high blood pressure or any bleeding problems?\"      Yes but in normal range  8. BLOOD THINNERS: \"Do you take any blood thinners?\" (e.g., coumadin, heparin, aspirin, Plavix)      plavix and ASA  9. OTHER SYMPTOMS: \"Do you have any other symptoms?\" (e.g., lightheadedness)      Productive phlegm cough x 2 months  10. PREGNANCY: \"Is there any chance you are pregnant?\" \"When was your last menstrual period?\"        n/a    Protocols used: NOSEBLEED-A-OH      "

## 2021-12-27 ENCOUNTER — TELEPHONE (OUTPATIENT)
Dept: NEUROSURGERY | Facility: CLINIC | Age: 61
End: 2021-12-27
Payer: COMMERCIAL

## 2021-12-27 DIAGNOSIS — I65.21 STENOSIS OF RIGHT CAROTID ARTERY: ICD-10-CM

## 2021-12-27 RX ORDER — CLOPIDOGREL BISULFATE 75 MG/1
75 TABLET ORAL DAILY
Qty: 30 TABLET | Refills: 3 | OUTPATIENT
Start: 2021-12-27

## 2021-12-27 NOTE — TELEPHONE ENCOUNTER
Fanny Iqbal MD   11/24/2021 10:17 AM CST Back to Top        Patient is sensitive to aspirin. Can stop Plavix and start 325 mg of aspirin daily.     Patient notified.  He states that he was not aware of this because he does not have MyChart.  He will discontinue the Plavix and start taking the ASA at 325mg daily.    Tanika Chappell RN

## 2021-12-27 NOTE — TELEPHONE ENCOUNTER
Health Call Center    Phone Message    May a detailed message be left on voicemail: yes     Reason for Call: Medication Refill Request    Has the patient contacted the pharmacy for the refill? Yes   Name of medication being requested: clopidogrel (PLAVIX) 75 MG tablet [[id number]] (Order [id number])    Provider who prescribed the medication: Dr. Kaye  Pharmacy: [facility] [id number]  Date medication is needed: ASAP    Patient is calling to get an update on Rx request- Patient will be out of Rx by Wednesday and was told that he will need to call in Dr. Kaye's team to get refills. Patient is scheduled to see his PCP on 01/24/22 and will need enough refills to last him until then.    Please review and advise.       Action Taken: Message routed to:  [facility]    Travel Screening: Not Applicable

## 2021-12-27 NOTE — TELEPHONE ENCOUNTER
Please check in with pt.  Looking into previous labs from November, pt was advised to stop plavix and continue aspirin.  Unsure if he received this message or if there was a change to this plan.  MORTEZA

## 2021-12-27 NOTE — TELEPHONE ENCOUNTER
"Per 11/24/21 result note from Dr. Iqbal regarding 11/19/21 Platelet Function Aspirin: \"Patient is sensitive to aspirin. Can stop Plavix and start 325 mg of aspirin daily.\"    Spoke with patient. Informed of above. Patient verbalized understanding. Charissa Hurtado RN 12/27/2021 2:46 PM        "

## 2022-01-24 ENCOUNTER — OFFICE VISIT (OUTPATIENT)
Dept: FAMILY MEDICINE | Facility: CLINIC | Age: 62
End: 2022-01-24
Payer: COMMERCIAL

## 2022-01-24 VITALS
BODY MASS INDEX: 35.38 KG/M2 | SYSTOLIC BLOOD PRESSURE: 126 MMHG | WEIGHT: 225.4 LBS | DIASTOLIC BLOOD PRESSURE: 78 MMHG | OXYGEN SATURATION: 93 % | HEIGHT: 67 IN | TEMPERATURE: 98.4 F | HEART RATE: 106 BPM

## 2022-01-24 DIAGNOSIS — R93.2 ABNORMAL FINDING ON IMAGING OF LIVER: ICD-10-CM

## 2022-01-24 DIAGNOSIS — J44.9 CHRONIC OBSTRUCTIVE PULMONARY DISEASE, UNSPECIFIED COPD TYPE (H): ICD-10-CM

## 2022-01-24 DIAGNOSIS — Z86.16 HISTORY OF 2019 NOVEL CORONAVIRUS DISEASE (COVID-19): ICD-10-CM

## 2022-01-24 DIAGNOSIS — G45.9 TIA (TRANSIENT ISCHEMIC ATTACK): ICD-10-CM

## 2022-01-24 DIAGNOSIS — I10 BENIGN ESSENTIAL HYPERTENSION: Primary | ICD-10-CM

## 2022-01-24 DIAGNOSIS — E66.01 MORBID OBESITY (H): ICD-10-CM

## 2022-01-24 DIAGNOSIS — I27.20 PULMONARY HYPERTENSION (H): ICD-10-CM

## 2022-01-24 PROCEDURE — 99214 OFFICE O/P EST MOD 30 MIN: CPT | Performed by: PHYSICIAN ASSISTANT

## 2022-01-24 PROCEDURE — 80076 HEPATIC FUNCTION PANEL: CPT | Performed by: PHYSICIAN ASSISTANT

## 2022-01-24 PROCEDURE — 36415 COLL VENOUS BLD VENIPUNCTURE: CPT | Performed by: PHYSICIAN ASSISTANT

## 2022-01-24 RX ORDER — LISINOPRIL 10 MG/1
TABLET ORAL
Qty: 90 TABLET | Refills: 1 | Status: SHIPPED | OUTPATIENT
Start: 2022-01-24 | End: 2022-08-03

## 2022-01-24 RX ORDER — ALBUTEROL SULFATE 90 UG/1
2 AEROSOL, METERED RESPIRATORY (INHALATION) EVERY 6 HOURS
Qty: 18 G | Refills: 5 | Status: SHIPPED | OUTPATIENT
Start: 2022-01-24 | End: 2023-04-12

## 2022-01-24 RX ORDER — MULTIVIT WITH MINERALS/LUTEIN
1000 TABLET ORAL DAILY
COMMUNITY
End: 2022-09-26

## 2022-01-24 RX ORDER — ATORVASTATIN CALCIUM 40 MG/1
40 TABLET, FILM COATED ORAL EVERY MORNING
Qty: 90 TABLET | Refills: 3 | Status: SHIPPED | OUTPATIENT
Start: 2022-01-24 | End: 2022-09-26

## 2022-01-24 RX ORDER — CHOLECALCIFEROL (VITAMIN D3) 50 MCG
1 TABLET ORAL DAILY
Qty: 30 TABLET | Refills: 0
Start: 2022-01-24 | End: 2022-09-26

## 2022-01-24 ASSESSMENT — MIFFLIN-ST. JEOR: SCORE: 1776.04

## 2022-01-24 ASSESSMENT — PAIN SCALES - GENERAL: PAINLEVEL: NO PAIN (0)

## 2022-01-24 NOTE — LETTER
January 25, 2022      Otf Bolaños  [address] [place] [address]      Victoriano Vanessa - good seeing you this week.     The liver tests looked ok - not enough of a change and I dont think we need to do more imaging at this time but we will need to pay close attention.    Continue with your excellent job of no alcohol and the more fit you can be and any wieght loss you can have will help as well!       Resulted Orders   Hepatic panel (Albumin, ALT, AST, Bili, Alk Phos, TP)   Result Value Ref Range    Bilirubin Total 0.6 0.2 - 1.3 mg/dL    Bilirubin Direct 0.2 0.0 - 0.2 mg/dL    Protein Total 7.8 6.8 - 8.8 g/dL    Albumin 3.9 3.4 - 5.0 g/dL    Alkaline Phosphatase 87 40 - 150 U/L    AST 21 0 - 45 U/L    ALT 32 0 - 70 U/L       If you have any questions or concerns, please call the clinic at the number listed above.       Sincerely,      Marquis

## 2022-01-24 NOTE — PROGRESS NOTES
"  Assessment & Plan     Benign essential hypertension  Controlled. Continue present management.     TIA (transient ischemic attack)  Continue present management of statin. Excellent BP control. He is now on [id number] and tolerating. 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 Affecting breathing, pressures and labs.     Chronic obstructive pulmonary disease, unspecified COPD type (H)  Overall controlled. Recent covid but did ok. Continues on 2.5L nightly; albuterol PRN. Feels controlled.     History of 2019 novel coronavirus disease (COVID-19)  Adding to PL    Abnormal finding on imaging of liver  I noted on his 09/2021 screen the mention of liver changes. Hx of ETOH abuse, stopped several years ago. Liver enzymes have been wnl previously. Updating and will assess via FIB4 for follow up   - Hepatic panel (Albumin, ALT, AST, Bili, Alk Phos, TP); Future  - Hepatic panel (Albumin, ALT, AST, Bili, Alk Phos, TP)       BMI:   Estimated body mass index is 35.3 kg/m  as calculated from the following:    Height as of this encounter: 1.702 m (5' 7\").    Weight as of this encounter: 102.2 kg (225 lb 6.4 oz).       Return in about 6 months (around 7/24/2022) for Physical Exam, Lab Work.    VIC Castellano [facility] RUBEN Finch   Otf is a 61 year old who presents for the following health issues     HPI     Medication Followup of ALL OF THEM     Taking Medication as prescribed: yes    Side Effects:  None    Medication Helping Symptoms:  yes   Otf Bolaños is a 61 year old male who presents today for medication check  He has been ok since fall, other than " "below  He was not aware of the recent change of some of his medications  Now on  and off planix  Had covid but did ok; still lingering cough  Denies any chest pain  Continues on medication with excellent compliance    Concern - Cough   Onset: PATIENT HAD COVID 1/2/22  Description: COUGHING UP WHITE PHLEGM   Intensity: moderate  Progression of Symptoms:  improving  Accompanying Signs & Symptoms: NA   Previous history of similar problem: NA   Precipitating factors:        Worsened by: COLD AIR  Alleviating factors:        Improved by: SLEEPING UPRIGHT   Therapies tried and outcome: NA     He developed runny nose and HAs  Some mild temp increases  Never a significant cough or shortness of breath; worse in the shower  He continued his O2 at night; never needed to add daytime O2  Using albuterol once daily; prn  Denies any ongoing wheeze  Still coughing up some white phlegm; never blood or purulent      Review of Systems   Constitutional, HEENT, cardiovascular, pulmonary, gi and gu systems are negative, except as otherwise noted.      Objective    /78 (BP Location: Right arm, Patient Position: Sitting, Cuff Size: Adult Large)   Pulse 106   Temp 98.4  F (36.9  C) (Oral)   Ht 1.702 m (5' 7\")   Wt 102.2 kg (225 lb 6.4 oz)   SpO2 93%   BMI 35.30 kg/m    Body mass index is 35.3 kg/m .  Physical Exam   GENERAL: healthy, alert and no distress  HENT: ear canals and TM's normal, nose and mouth without ulcers or lesions  NECK: no adenopathy; there is returning fat deposit throughout  RESP: decreased breath sounds throughout; no rales or rhonchi  CV: tachycardia, normal S1 S2, no S3 or S4 and no murmur, click or rub  ABDOMEN: obese, non tender, no appreciable organomegaly   MS: No peripheral edema   PSYCH: mentation appears normal, affect normal/bright    Reviewed 09/2021 scans              "

## 2022-01-25 LAB
ALBUMIN SERPL-MCNC: 3.9 G/DL (ref 3.4–5)
ALP SERPL-CCNC: 87 U/L (ref 40–150)
ALT SERPL W P-5'-P-CCNC: 32 U/L (ref 0–70)
AST SERPL W P-5'-P-CCNC: 21 U/L (ref 0–45)
BILIRUB DIRECT SERPL-MCNC: 0.2 MG/DL (ref 0–0.2)
BILIRUB SERPL-MCNC: 0.6 MG/DL (ref 0.2–1.3)
PROT SERPL-MCNC: 7.8 G/DL (ref 6.8–8.8)

## 2022-06-02 DIAGNOSIS — G45.9 TIA (TRANSIENT ISCHEMIC ATTACK): ICD-10-CM

## 2022-06-03 RX ORDER — ASPIRIN 325 MG
325 TABLET, DELAYED RELEASE (ENTERIC COATED) ORAL DAILY
Qty: 100 TABLET | Refills: 1 | Status: SHIPPED | OUTPATIENT
Start: 2022-06-03 | End: 2022-09-26

## 2022-06-03 NOTE — TELEPHONE ENCOUNTER
Prescription approved per [facility] Refill Protocol.    Sagrario Vivas RN     [Follow-Up Evaluation] : a follow-up evaluation for [Seizure Disorder] : seizure disorder [Mother] : mother

## 2022-07-31 DIAGNOSIS — G45.9 TIA (TRANSIENT ISCHEMIC ATTACK): ICD-10-CM

## 2022-08-03 RX ORDER — LISINOPRIL 10 MG/1
TABLET ORAL
Qty: 90 TABLET | Refills: 0 | Status: SHIPPED | OUTPATIENT
Start: 2022-08-03 | End: 2022-09-26

## 2022-08-03 NOTE — TELEPHONE ENCOUNTER
Medication is being filled for 1 time refill only due to:  Patient needs to be seen because due for visit and labs..     01/24/2022 Brian Mckinley PA-C Office Visit  Return in about 6 months (around 7/24/2022) for Physical Exam, Lab Work.     Zara Dueñas RN

## 2022-08-04 ENCOUNTER — ALLIED HEALTH/NURSE VISIT (OUTPATIENT)
Dept: FAMILY MEDICINE | Facility: CLINIC | Age: 62
End: 2022-08-04
Payer: COMMERCIAL

## 2022-08-04 VITALS — DIASTOLIC BLOOD PRESSURE: 60 MMHG | SYSTOLIC BLOOD PRESSURE: 122 MMHG

## 2022-08-04 DIAGNOSIS — Z01.30 BP CHECK: Primary | ICD-10-CM

## 2022-08-04 PROCEDURE — 99207 PR NO CHARGE NURSE ONLY: CPT | Performed by: PHYSICIAN ASSISTANT

## 2022-08-04 NOTE — PROGRESS NOTES
Otf Bolaños was evaluated at [facility] on August 4, 2022 at which time his blood pressure was:    BP Readings from Last 3 Encounters:   08/04/22 122/60   01/24/22 126/78   09/17/21 127/79     Pulse Readings from Last 3 Encounters:   01/24/22 106   09/17/21 85   09/10/21 80       Reviewed lifestyle modifications for blood pressure control and reduction: including making healthy food choices, managing weight, getting regular exercise, smoking cessation, reducing alcohol consumption, monitoring blood pressure regularly.     Symptoms: None    BP Goal:< 140/90 mmHg    BP Assessment:  BP at goal    Potential Reasons for BP too high: NA - Not applicable    BP Follow-Up Plan: Recheck BP in 6 months at pharmacy    Recommendation to Provider: none    Note completed by: Jimbo Franco    Thank You   Jaci Minor [facility]

## 2022-08-08 ENCOUNTER — MEDICAL CORRESPONDENCE (OUTPATIENT)
Dept: HEALTH INFORMATION MANAGEMENT | Facility: CLINIC | Age: 62
End: 2022-08-08

## 2022-08-08 ENCOUNTER — TELEPHONE (OUTPATIENT)
Dept: FAMILY MEDICINE | Facility: CLINIC | Age: 62
End: 2022-08-08

## 2022-08-08 NOTE — TELEPHONE ENCOUNTER
Rec'd Annual Oxygen Order from [facility]. Placed in PCP basket for review and signature. Fax [phone]    Marietta Chow-

## 2022-08-30 ENCOUNTER — MEDICAL CORRESPONDENCE (OUTPATIENT)
Dept: HEALTH INFORMATION MANAGEMENT | Facility: CLINIC | Age: 62
End: 2022-08-30

## 2022-09-26 ENCOUNTER — OFFICE VISIT (OUTPATIENT)
Dept: FAMILY MEDICINE | Facility: CLINIC | Age: 62
End: 2022-09-26
Payer: COMMERCIAL

## 2022-09-26 VITALS
WEIGHT: 221 LBS | SYSTOLIC BLOOD PRESSURE: 124 MMHG | TEMPERATURE: 97.9 F | RESPIRATION RATE: 18 BRPM | HEART RATE: 92 BPM | OXYGEN SATURATION: 96 % | BODY MASS INDEX: 34.69 KG/M2 | HEIGHT: 67 IN | DIASTOLIC BLOOD PRESSURE: 68 MMHG

## 2022-09-26 DIAGNOSIS — I10 BENIGN ESSENTIAL HYPERTENSION: ICD-10-CM

## 2022-09-26 DIAGNOSIS — N52.9 ERECTILE DYSFUNCTION, UNSPECIFIED ERECTILE DYSFUNCTION TYPE: ICD-10-CM

## 2022-09-26 DIAGNOSIS — Z12.5 SCREENING FOR PROSTATE CANCER: ICD-10-CM

## 2022-09-26 DIAGNOSIS — R73.03 PRE-DIABETES: ICD-10-CM

## 2022-09-26 DIAGNOSIS — J44.9 CHRONIC OBSTRUCTIVE PULMONARY DISEASE, UNSPECIFIED COPD TYPE (H): ICD-10-CM

## 2022-09-26 DIAGNOSIS — Z00.00 WELLNESS EXAMINATION: Primary | ICD-10-CM

## 2022-09-26 DIAGNOSIS — E66.01 MORBID OBESITY (H): ICD-10-CM

## 2022-09-26 DIAGNOSIS — Z00.00 ENCOUNTER FOR MEDICARE ANNUAL WELLNESS EXAM: ICD-10-CM

## 2022-09-26 DIAGNOSIS — E88.2: ICD-10-CM

## 2022-09-26 DIAGNOSIS — Z86.73 HISTORY OF TIA (TRANSIENT ISCHEMIC ATTACK): ICD-10-CM

## 2022-09-26 LAB
ALBUMIN SERPL-MCNC: 3.9 G/DL (ref 3.4–5)
ALP SERPL-CCNC: 84 U/L (ref 40–150)
ALT SERPL W P-5'-P-CCNC: 31 U/L (ref 0–70)
ANION GAP SERPL CALCULATED.3IONS-SCNC: 6 MMOL/L (ref 3–14)
AST SERPL W P-5'-P-CCNC: 18 U/L (ref 0–45)
BILIRUB SERPL-MCNC: 0.5 MG/DL (ref 0.2–1.3)
BUN SERPL-MCNC: 18 MG/DL (ref 7–30)
CALCIUM SERPL-MCNC: 9.6 MG/DL (ref 8.5–10.1)
CHLORIDE BLD-SCNC: 105 MMOL/L (ref 94–109)
CHOLEST SERPL-MCNC: 100 MG/DL
CO2 SERPL-SCNC: 29 MMOL/L (ref 20–32)
CREAT SERPL-MCNC: 0.86 MG/DL (ref 0.66–1.25)
ERYTHROCYTE [DISTWIDTH] IN BLOOD BY AUTOMATED COUNT: 12.6 % (ref 10–15)
FASTING STATUS PATIENT QL REPORTED: YES
GFR SERPL CREATININE-BSD FRML MDRD: >90 ML/MIN/1.73M2
GLUCOSE BLD-MCNC: 101 MG/DL (ref 70–99)
HBA1C MFR BLD: 5.8 % (ref 0–5.6)
HCT VFR BLD AUTO: 48.7 % (ref 40–53)
HDLC SERPL-MCNC: 53 MG/DL
HGB BLD-MCNC: 15.4 G/DL (ref 13.3–17.7)
LDLC SERPL CALC-MCNC: 25 MG/DL
MCH RBC QN AUTO: 29.2 PG (ref 26.5–33)
MCHC RBC AUTO-ENTMCNC: 31.6 G/DL (ref 31.5–36.5)
MCV RBC AUTO: 92 FL (ref 78–100)
NONHDLC SERPL-MCNC: 47 MG/DL
PLATELET # BLD AUTO: 158 10E3/UL (ref 150–450)
POTASSIUM BLD-SCNC: 4.5 MMOL/L (ref 3.4–5.3)
PROT SERPL-MCNC: 7.5 G/DL (ref 6.8–8.8)
PSA SERPL-MCNC: 0.89 UG/L (ref 0–4)
RBC # BLD AUTO: 5.27 10E6/UL (ref 4.4–5.9)
SODIUM SERPL-SCNC: 140 MMOL/L (ref 133–144)
TRIGL SERPL-MCNC: 110 MG/DL
WBC # BLD AUTO: 10.5 10E3/UL (ref 4–11)

## 2022-09-26 PROCEDURE — 83036 HEMOGLOBIN GLYCOSYLATED A1C: CPT | Performed by: PHYSICIAN ASSISTANT

## 2022-09-26 PROCEDURE — 85027 COMPLETE CBC AUTOMATED: CPT | Performed by: PHYSICIAN ASSISTANT

## 2022-09-26 PROCEDURE — G0103 PSA SCREENING: HCPCS | Performed by: PHYSICIAN ASSISTANT

## 2022-09-26 PROCEDURE — 80061 LIPID PANEL: CPT | Performed by: PHYSICIAN ASSISTANT

## 2022-09-26 PROCEDURE — G0439 PPPS, SUBSEQ VISIT: HCPCS | Performed by: PHYSICIAN ASSISTANT

## 2022-09-26 PROCEDURE — 99214 OFFICE O/P EST MOD 30 MIN: CPT | Mod: 25 | Performed by: PHYSICIAN ASSISTANT

## 2022-09-26 PROCEDURE — 80053 COMPREHEN METABOLIC PANEL: CPT | Performed by: PHYSICIAN ASSISTANT

## 2022-09-26 PROCEDURE — 36415 COLL VENOUS BLD VENIPUNCTURE: CPT | Performed by: PHYSICIAN ASSISTANT

## 2022-09-26 RX ORDER — ATORVASTATIN CALCIUM 40 MG/1
40 TABLET, FILM COATED ORAL EVERY MORNING
Qty: 90 TABLET | Refills: 3 | Status: SHIPPED | OUTPATIENT
Start: 2022-09-26 | End: 2023-11-08

## 2022-09-26 RX ORDER — LISINOPRIL 10 MG/1
TABLET ORAL
Qty: 90 TABLET | Refills: 1 | Status: SHIPPED | OUTPATIENT
Start: 2022-09-26 | End: 2023-06-07

## 2022-09-26 RX ORDER — ASPIRIN 325 MG
325 TABLET, DELAYED RELEASE (ENTERIC COATED) ORAL DAILY
Qty: 90 TABLET | Refills: 3 | Status: SHIPPED | OUTPATIENT
Start: 2022-09-26 | End: 2023-09-06

## 2022-09-26 ASSESSMENT — PAIN SCALES - GENERAL: PAINLEVEL: NO PAIN (0)

## 2022-09-26 NOTE — PROGRESS NOTES
SUBJECTIVE:   Otf is a 62 year old who presents for Preventive Visit.      Patient has been advised of split billing requirements and indicates understanding: Yes  Are you in the first 12 months of your Medicare coverage?  No    HPI  Do you feel safe in your environment? Yes    Have you ever done Advance Care Planning? (For example, a Health Directive, POLST, or a discussion with a medical provider or your loved ones about your wishes): No, advance care planning information given to patient to review.  Patient declined advance care planning discussion at this time.       Fall risk  Fallen 2 or more times in the past year?: No  Any fall with injury in the past year?: No    Cognitive Screening   1) Repeat 3 items (Leader, Season, Table)    2) Clock draw: NORMAL  3) 3 item recall: Recalls 3 objects  Results: 3 items recalled: COGNITIVE IMPAIRMENT LESS LIKELY    Mini-CogTM Copyright S Catherine. Licensed by the author for use in [facility]; reprinted with permission (john@[facility]). All rights reserved.        Reviewed and updated as needed this visit by clinical staff   Tobacco  Allergies  Meds   Med Hx  Surg Hx  Fam Hx  Soc Hx          Reviewed and updated as needed this visit by Provider                   Social History     Tobacco Use     Smoking status: Former Smoker     Packs/day: 2.00     Years: 30.00     Pack years: 60.00     Quit date: 2014     Years since quittin.7     Smokeless tobacco: Never Used   Substance Use Topics     Alcohol use: Not Currently     If you drink alcohol do you typically have >3 drinks per day or >7 drinks per week? No    No flowsheet data found.      Current providers sharing in care for this patient include:   Patient Care Team:  Brian Mckinley PA-C as PCP - General (Physician Assistant)  Brian Mckinley PA-C as Assigned PCP  Fanny Iqbal MD as Assigned Neuroscience Provider    The following health maintenance items are reviewed in  "Epic and correct as of today:  Health Maintenance   Topic Date Due     SPIROMETRY  Never done     COPD ACTION PLAN  Never done     COLORECTAL CANCER SCREENING  Never done     ZOSTER IMMUNIZATION (1 of 2) Never done     Pneumococcal Vaccine: Pediatrics (0 to 5 Years) and At-Risk Patients (6 to 64 Years) (2 - PCV) 02/14/2015     LUNG CANCER SCREENING  06/05/2015     ANNUAL REVIEW OF HM ORDERS  02/01/2022     COVID-19 Vaccine (5 - Booster for Moderna series) 07/18/2022     INFLUENZA VACCINE (1) 09/01/2022     MEDICARE ANNUAL WELLNESS VISIT  09/26/2023     DTAP/TDAP/TD IMMUNIZATION (3 - Td or Tdap) 12/02/2023     LIPID  04/30/2026     ADVANCE CARE PLANNING  09/26/2027     HEPATITIS C SCREENING  Completed     HIV SCREENING  Completed     PHQ-2 (once per calendar year)  Completed     IPV IMMUNIZATION  Aged Out     MENINGITIS IMMUNIZATION  Aged Out     HEPATITIS B IMMUNIZATION  Aged Out     Review of Systems  Constitutional, HEENT, cardiovascular, pulmonary, gi and gu systems are negative, except as otherwise noted.    OBJECTIVE:   /68 (BP Location: Right arm, Patient Position: Sitting, Cuff Size: Adult Large)   Pulse 92   Temp 97.9  F (36.6  C) (Oral)   Resp 18   Ht 1.689 m (5' 6.5\")   Wt 100.2 kg (221 lb)   SpO2 96%   BMI 35.14 kg/m   Estimated body mass index is 35.14 kg/m  as calculated from the following:    Height as of this encounter: 1.689 m (5' 6.5\").    Weight as of this encounter: 100.2 kg (221 lb).  Physical Exam  GENERAL: healthy, alert and no distress  EYES: Eyes grossly normal to inspection, PERRL and conjunctivae and sclerae normal  HENT: ear canals and TM's normal, nose and mouth without ulcers or lesions  NECK: no adenopathy, no asymmetry, masses, or scars and thyroid normal to palpation  RESP: decreased breath sounds throughout  CV: regular rates and rhythm, normal S1 S2, no S3 or S4 and no murmur, click or rub  ABDOMEN: soft, nontender, no hepatosplenomegaly, no masses and bowel sounds " normal  MS: No peripheral edema   SKIN: there is a superfluous build up of skin around the neck and over the posterior cervical space  PSYCH: mentation appears normal, affect normal/bright    Diagnostic Test Results:  Labs reviewed in Epic  Updating today    ASSESSMENT / PLAN:   1. Wellness examination  Reviewed personal and family history. Reviewed age appropriate screenings. Recommended any needed vaccinations. He will return for them with his spouse    2. Chronic obstructive pulmonary disease, unspecified COPD type (H)  Overall stable. He still desats into 80s with some of his exercises (carrying laundry upstairs, playint with dogs) but overall ok. Needs at night as well as laying flat can be into upper 80s/low 90s as well. Uses albuterol prn  - CBC with platelets; Future  - CBC with platelets    3. History of TIA (transient ischemic attack)  Stable. No further symptoms. Continue lifestyle modiifcation  - Lipid panel reflex to direct LDL Fasting; Future  - lisinopril (ZESTRIL) 10 MG tablet; TAKE ONE TABLET BY MOUTH ONCE DAILY  Dispense: 90 tablet; Refill: 1  - atorvastatin (LIPITOR) 40 MG tablet; Take 1 tablet (40 mg) by mouth every morning  Dispense: 90 tablet; Refill: 3  - aspirin (ASA) 325 MG EC tablet; Take 1 tablet (325 mg) by mouth daily  Dispense: 90 tablet; Refill: 3  - Lipid panel reflex to direct LDL Fasting    4. Morbid obesity (H)  Continues to work at [employer] to help with pre-diabetes, TIA risk factor avoidance and COPD  - Lipid panel reflex to direct LDL Fasting; Future  - Lipid panel reflex to direct LDL Fasting    5. Pre-diabetes  Updating today. Lifestyle intervention reviewed  - Lipid panel reflex to direct LDL Fasting; Future  - Comprehensive metabolic panel (BMP + Alb, Alk Phos, ALT, AST, Total. Bili, TP); Future  - Hemoglobin A1c; Future  - Lipid panel reflex to direct LDL Fasting  - Comprehensive metabolic panel (BMP + Alb, Alk Phos, ALT, AST, Total. Bili, TP)  - Hemoglobin A1c    6.  "Multiple symmetrical lipomatosis  He is debating returning to [place]. Many of his areas that were surgically treated have returned.     7. Erectile dysfunction, unspecified erectile dysfunction type  For now he is not requesting medication but can anytime    8. Screening for prostate cancer  Updating.   - PSA, screen; Future  - PSA, screen    9. Encounter for Medicare annual wellness exam        COUNSELING:  Reviewed preventive health counseling, as reflected in patient instructions    Estimated body mass index is 35.14 kg/m  as calculated from the following:    Height as of this encounter: 1.689 m (5' 6.5\").    Weight as of this encounter: 100.2 kg (221 lb).      He reports that he quit smoking about 8 years ago. He has a 60.00 pack-year smoking history. He has never used smokeless tobacco.      Appropriate preventive services were discussed with this patient, including applicable screening as appropriate for cardiovascular disease, diabetes, osteopenia/osteoporosis, and glaucoma.  As appropriate for age/gender, discussed screening for colorectal cancer, prostate cancer, breast cancer, and cervical cancer. Checklist reviewing preventive services available has been given to the patient.    Reviewed patients plan of care and provided an AVS. The Basic Care Plan (routine screening as documented in Health Maintenance) for Otf meets the Care Plan requirement. This Care Plan has been established and reviewed with the Patient.    Counseling Resources:  ATP IV Guidelines  Pooled Cohorts Equation Calculator  Breast Cancer Risk Calculator  Breast Cancer: Medication to Reduce Risk  FRAX Risk Assessment  ICSI Preventive Guidelines  Dietary Guidelines for Americans, 2010  CatchMe!'s MyPlate  ASA Prophylaxis  Lung CA Screening    Brian Mckinley PA-C  [facility]    Identified Health Risks:  "

## 2022-09-26 NOTE — PATIENT INSTRUCTIONS
Come on back and do BOTH your COVID and FLU shots    Consider doing SHINGLES some time later this year as well  Patient Education   Personalized Prevention Plan  You are due for the preventive services outlined below.  Your care team is available to assist you in scheduling these services.  If you have already completed any of these items, please share that information with your care team to update in your medical record.  Health Maintenance Due   Topic Date Due     Breathing Capacity Test  Never done     COPD Action Plan  Never done     Colorectal Cancer Screening  Never done     Zoster (Shingles) Vaccine (1 of 2) Never done     Pneumococcal Vaccine (2 - PCV) 02/14/2015     LUNG CANCER SCREENING  06/05/2015     COVID-19 Vaccine (5 - Booster for Moderna series) 07/18/2022     Flu Vaccine (1) 09/01/2022

## 2022-09-26 NOTE — LETTER
September 26, 2022      Otf Bolaños  [address] [place] [address]      Victoriano Vanessa - good seeing you today.   Copies of the labs are included and overall they look good!   The PSA, screening the prostate, looked healthy.   CHolesterol looks excellent as well.   Screening of the liver enzymes, kidneys, electrolytes and for diabetes looked within the expected ranges - the blood sugars are just barely into the pre-diabetes range to keep working at staying active and healthy eating.   Finally, your red and white blood cell counts look good.   All in all these are great!   Marquis       Resulted Orders   PSA, screen   Result Value Ref Range    Prostate Specific Antigen Screen 0.89 0.00 - 4.00 ug/L   Lipid panel reflex to direct LDL Fasting   Result Value Ref Range    Cholesterol 100 <200 mg/dL    Triglycerides 110 <150 mg/dL    Direct Measure HDL 53 >=40 mg/dL    LDL Cholesterol Calculated 25 <=100 mg/dL    Non HDL Cholesterol 47 <130 mg/dL    Patient Fasting > 8hrs? Yes     Narrative    Cholesterol  Desirable:  <200 mg/dL    Triglycerides  Normal:  Less than 150 mg/dL  Borderline High:  150-199 mg/dL  High:  200-499 mg/dL  Very High:  Greater than or equal to 500 mg/dL    Direct Measure HDL  Female:  Greater than or equal to 50 mg/dL   Male:  Greater than or equal to 40 mg/dL    LDL Cholesterol  Desirable:  <100mg/dL  Above Desirable:  100-129 mg/dL   Borderline High:  130-159 mg/dL   High:  160-189 mg/dL   Very High:  >= 190 mg/dL    Non HDL Cholesterol  Desirable:  130 mg/dL  Above Desirable:  130-159 mg/dL  Borderline High:  160-189 mg/dL  High:  190-219 mg/dL  Very High:  Greater than or equal to 220 mg/dL   Comprehensive metabolic panel (BMP + Alb, Alk Phos, ALT, AST, Total. Bili, TP)   Result Value Ref Range    Sodium 140 133 - 144 mmol/L    Potassium 4.5 3.4 - 5.3 mmol/L    Chloride 105 94 - 109 mmol/L    Carbon Dioxide (CO2) 29 20 - 32 mmol/L    Anion Gap 6 3 - 14 mmol/L    Urea Nitrogen 18 7 - 30  mg/dL    Creatinine 0.86 0.66 - 1.25 mg/dL    Calcium 9.6 8.5 - 10.1 mg/dL    Glucose 101 (H) 70 - 99 mg/dL    Alkaline Phosphatase 84 40 - 150 U/L    AST 18 0 - 45 U/L    ALT 31 0 - 70 U/L    Protein Total 7.5 6.8 - 8.8 g/dL    Albumin 3.9 3.4 - 5.0 g/dL    Bilirubin Total 0.5 0.2 - 1.3 mg/dL    GFR Estimate >90 >60 mL/min/1.73m2      Comment:      Effective December 21, 2021 eGFRcr in adults is calculated using the 2021 CKD-EPI creatinine equation which includes age and gender (Cieol et al., NE, DOI: 10.1056/LHTMmf6500345)   CBC with platelets   Result Value Ref Range    WBC Count 10.5 4.0 - 11.0 10e3/uL    RBC Count 5.27 4.40 - 5.90 10e6/uL    Hemoglobin 15.4 13.3 - 17.7 g/dL    Hematocrit 48.7 40.0 - 53.0 %    MCV 92 78 - 100 fL    MCH 29.2 26.5 - 33.0 pg    MCHC 31.6 31.5 - 36.5 g/dL    RDW 12.6 10.0 - 15.0 %    Platelet Count 158 150 - 450 10e3/uL   Hemoglobin A1c   Result Value Ref Range    Hemoglobin A1C 5.8 (H) 0.0 - 5.6 %      Comment:      Normal <5.7%   Prediabetes 5.7-6.4%    Diabetes 6.5% or higher     Note: Adopted from ADA consensus guidelines.       If you have any questions or concerns, please call the clinic at the number listed above.

## 2022-10-04 ENCOUNTER — TELEPHONE (OUTPATIENT)
Dept: FAMILY MEDICINE | Facility: CLINIC | Age: 62
End: 2022-10-04

## 2022-10-04 DIAGNOSIS — N52.9 ERECTILE DYSFUNCTION, UNSPECIFIED ERECTILE DYSFUNCTION TYPE: Primary | ICD-10-CM

## 2022-10-04 RX ORDER — SILDENAFIL CITRATE 20 MG/1
40-100 TABLET ORAL DAILY PRN
Qty: 30 TABLET | Refills: 1 | Status: SHIPPED | OUTPATIENT
Start: 2022-10-04

## 2022-10-04 NOTE — TELEPHONE ENCOUNTER
Patient calling, needing refill of sildenafil (REVATIO) 20 MG tablet [[id number]] (Order [id number]) which is not on active list. He said this was discussed at his last appt with Marquis.  He uses the [facility] pharmacy.    Yumiko CARRANZA  [facility]   [facility]

## 2023-04-11 DIAGNOSIS — J44.9 CHRONIC OBSTRUCTIVE PULMONARY DISEASE, UNSPECIFIED COPD TYPE (H): ICD-10-CM

## 2023-04-11 DIAGNOSIS — I27.20 PULMONARY HYPERTENSION (H): ICD-10-CM

## 2023-04-12 RX ORDER — ALBUTEROL SULFATE 90 UG/1
2 AEROSOL, METERED RESPIRATORY (INHALATION) EVERY 6 HOURS
Qty: 8.5 G | Refills: 3 | Status: SHIPPED | OUTPATIENT
Start: 2023-04-12 | End: 2024-06-07

## 2023-04-12 NOTE — TELEPHONE ENCOUNTER
Prescription approved per [facility] Refill Protocol.      Shante Griffiths RN

## 2023-06-05 DIAGNOSIS — Z86.73 HISTORY OF TIA (TRANSIENT ISCHEMIC ATTACK): ICD-10-CM

## 2023-06-05 NOTE — ED PROVIDER NOTES
History     Chief Complaint:  Eye Problem    The history is provided by the patient.      Otf Bolaños is a 61 year old male with a history of hypertension and COPD who presents with an eye problem. This came on after running errands around town, doing laundry, putting away groceries and tools. He seated himself and felt flushed. When he stood up, he noticed a tingling sensation in his bilateral hands and the vision in his right eye went completely dark blue in color. This lasted for 30-40 minutes and resolved on its own when the patient was being driven to the emergency room by his wife. He mentions that he had lipomas on his head and neck that have been removed. He denies chest pain and shortness of breath. He denies a history of diabetes, stroke, and atrial fibrillation.     Review of Systems   Eyes: Positive for visual disturbance (resolved).   Respiratory: Negative for shortness of breath.    Cardiovascular: Negative for chest pain.   Neurological: Positive for numbness.   All other systems reviewed and are negative.    Allergies:  Bee Venom  Hydrochlorothiazide   Metoprolol      Medications:    albuterol  aspirin 81 MG tablet  lisinopril  sildenafil     Past Medical History:    Left Lung Collapse   ETOH Abuse  Hypertension  Hypokalemia  Low Back Pain  Malnutrition  Multiple Symmetrical Lipomatosis  Thrombocytopaenia  Tobacco Abuse  Varicose Vein  VRE  Obesity  COPD  Patient denies a history of diabetes, stroke, and afib.     Past Surgical History:    Tracheostomy    Family History:    Father: Hypertension, MI  Paternal Grandfather: Cancer    Social History:   Accompanied by wife to the ER.  Patient is .    Physical Exam     Patient Vitals for the past 24 hrs:   BP Temp Temp src Pulse Resp SpO2 Weight   04/29/21 2330 133/67 -- -- 81 18 98 % --   04/29/21 2319 -- -- -- -- 20 -- --   04/29/21 2318 -- -- -- 80 14 94 % --   04/29/21 2317 -- -- -- 86 19 95 % --   04/29/21 2316 -- -- -- 80 20 98 % --    04/29/21 2315 135/74 -- -- 81 20 96 % --   04/29/21 2145 (!) 148/77 -- -- 89 (!) 32 96 % --   04/29/21 2137 -- -- -- 90 18 96 % --   04/29/21 2133 -- -- -- 90 -- -- --   04/29/21 2132 -- -- -- 98 -- -- --   04/29/21 2131 (!) 148/86 -- -- 99 -- -- --   04/29/21 1900 113/77 -- -- 88 14 93 % --   04/29/21 1815 (!) 158/90 -- -- 109 21 -- --   04/29/21 1750 (!) 152/88 97.8  F (36.6  C) Temporal 101 16 94 % 101.7 kg (224 lb 3.3 oz)   Vision - Corrective Lenses: None Visual Acuity-Right: 20/30 Visual Acuity-Left: 20/30  Physical Exam  General: Alert, appears well-developed and well-nourished. Cooperative.     In mild distress, anxious  HEENT:  Head:  Atraumatic  Ears:  External ears are normal  Mouth/Throat:  Oropharynx is without erythema or exudate and mucous membranes are moist.   Eyes:   Conjunctivae normal and EOM are normal. Left pupil is smaller than right pupil, but bilateral pupils are reactive appropriately.  Patient thinks his left pupil is chronically smaller than the right.  No scleral icterus.  CV:  tachycardic rate, regular rhythm, normal heart sounds and radial pulses are 2+ and symmetric.  No murmur.  Resp:  Breath sounds are clear bilaterally.  No wheezing.  On 2LPM NC O2 (chronic for patient due to COPD).    Non-labored, no retractions or accessory muscle use  GI:  Obese. Abdomen is soft, no distension, no tenderness. No rebound or guarding.  No CVA tenderness bilaterally  MS:  Normal range of motion. No edema.    Normal strength in all 4 extremities.     Back atraumatic.    No midline cervical, thoracic, or lumbar tenderness  Skin:  Warm and dry.  No rash or lesions noted.  Neuro: Alert. Normal strength.  Sensation intact in all 4 extremities. GCS: 15    Cranial nerves 2-12 intact.  Psych:  Normal mood and affect.    Emergency Department Course   ECG:  ECG taken at 1821, ECG read at 1824  Sinus rhythm with short NC  Otherwise normal ECG   No significant changes as compared to prior, dated  1/26/2014.  Rate 84 bpm. AL interval 110 ms. QRS duration 82 ms. QT/QTc 370/437 ms. P-R-T axes 26 48 60.     Imaging:  CTA Head Neck with Contrast  HEAD CTA:   1.  Occlusion of the right internal carotid artery at the skull base with reconstitution of this vessel presumably via an intact [place] of [place]. There is evidence for asymmetrically reduced flow within the supraclinoid right ICA segment and proximal   right anterior circulation branches.   2.  No additional proximal branch occlusion. Specifically, the right ophthalmic artery remains patent.   3.  No aneurysm or high flow vascular malformation.   Report per radiology     NECK CTA:   1.  Occlusion of the right internal carotid artery just beyond its origin. This is new compared to a neck CT from 09/24/2015 and may be acute to subacute in nature.   2.  No hemodynamically significant stenosis in the remaining cervical vessels.    Ct Head w/o Contrast   1.  No evidence of an acute intracranial abnormality.   2.  Mild atrophy.  Report per radiology     Laboratory:  CBC: WBC 12.5 (H), HGB 15.3,    BMP: Anion gap <1 (L) o/w WNL (Creatinine 0.9)   Troponin (Collected 1758): <0.015  TSH: 1.18  INR: 0.99  Glucose by meter(Collection time 1802): 94   Symptomatic Influenza A/B & SARS-CoV2 (COVID19) Virus PCR Multiplex: Negative     Emergency Department Course:    Reviewed:  1733  I reviewed nursing notes, vitals, past history and care everywhere    Assessments:  1756 I obtained history and examined the patient as noted above.   1815 I rechecked the patient and explained findings.   2113 I rechecked the patient and explained findings.  2135 I rechecked the patient.  Consults:   1813 Dr. Poole of Stroke Neurology  2101 Dr. Poole of Stroke/Neurology  2234 Dr. Kevin Hoffman Hospitalist at [facility]     Interventions:  2119 Plavix 300mg PO  2119 Aspirin 325mg PO    Disposition:  The patient was transferred to [facility] via Ambulance. Dr. Kevin Hoffman accepted the  patient for transfer.    Impression & Plan      Medical Decision Making:  Patient is a 61-year-old male with a history of COPD and hypertension who presents with sudden onset of right eye vision loss as well as left hand tingling.  Symptoms have resolved here and concern for potential TIA versus an intermittent central retinal artery occlusion.  Patient did have a CT and CTA in discussion with stroke neurology.  Unfortunately CTA shows a right-sided occlusion of the right internal carotid artery with distal reconstitution.  In discussion with stroke neurology it appears that this would likely be a new finding for the patient based on historic imaging.  I do suspect this is likely etiology of his transient symptoms, although thankfully he is neuro intact at this time, with no vision loss changes or numbness or tingling to the left hand.  Patient's blood work is grossly unremarkable here.  Stroke neurology had recommended full dose aspirin Plavix load as well as transfer to [facility] for potential vascular intervention.  I spoke with Dr.Nick Hoffman of the hospitalist service at [facility] who agreed to transfer & admission at this time.      Critical Care time:  none    Covid-19  Otf Bolaños was evaluated during a global COVID-19 pandemic, which necessitated consideration that the patient might be at risk for infection with the SARS-CoV-2 virus that causes COVID-19.   Applicable protocols for evaluation were followed during the patient's care.   COVID-19 was considered as part of the patient's evaluation. The plan for testing is:  a test was obtained during this visit.    Diagnosis:    ICD-10-CM    1. Right carotid artery occlusion  I65.21 Asymptomatic SARS-CoV-2 COVID-19 Virus (Coronavirus) by PCR   2. Numbness and tingling in left hand  R20.0     R20.2    3. Vision, loss, sudden, right  H53.131 CBC with platelets differential     Basic metabolic panel     Troponin I     TSH with free T4 reflex     INR        Scribe Disclosure:  I, Zeny Rich and Ana Sohok, am serving as a scribe at 5:54 PM on 4/29/2021 to document services personally performed by Duke Patel MD based on my observations and the provider's statements to me.      Duke Patel MD  04/30/21 0028       Duke Patel MD  04/30/21 0153     Low Dose Naltrexone Pregnancy And Lactation Text: Naltrexone is pregnancy category C.  There have been no adequate and well-controlled studies in pregnant women.  It should be used in pregnancy only if the potential benefit justifies the potential risk to the fetus.   Limited data indicates that naltrexone is minimally excreted into breastmilk.

## 2023-06-07 RX ORDER — LISINOPRIL 10 MG/1
TABLET ORAL
Qty: 90 TABLET | Refills: 0 | Status: SHIPPED | OUTPATIENT
Start: 2023-06-07 | End: 2023-09-06

## 2023-07-31 ENCOUNTER — TELEPHONE (OUTPATIENT)
Dept: FAMILY MEDICINE | Facility: CLINIC | Age: 63
End: 2023-07-31
Payer: COMMERCIAL

## 2023-07-31 NOTE — TELEPHONE ENCOUNTER
Rec'd Annual Oxygen Order from [facility]. Placed in PCP basket for review and signature. Fax [phone]    Laura Fritz

## 2023-08-01 ENCOUNTER — MEDICAL CORRESPONDENCE (OUTPATIENT)
Dept: HEALTH INFORMATION MANAGEMENT | Facility: CLINIC | Age: 63
End: 2023-08-01

## 2023-08-28 ENCOUNTER — PATIENT OUTREACH (OUTPATIENT)
Dept: CARE COORDINATION | Facility: CLINIC | Age: 63
End: 2023-08-28
Payer: COMMERCIAL

## 2023-09-04 DIAGNOSIS — Z86.73 HISTORY OF TIA (TRANSIENT ISCHEMIC ATTACK): ICD-10-CM

## 2023-09-06 DIAGNOSIS — Z86.73 HISTORY OF TIA (TRANSIENT ISCHEMIC ATTACK): ICD-10-CM

## 2023-09-06 RX ORDER — LISINOPRIL 10 MG/1
TABLET ORAL
Qty: 90 TABLET | Refills: 0 | Status: SHIPPED | OUTPATIENT
Start: 2023-09-06 | End: 2023-12-06

## 2023-09-06 NOTE — TELEPHONE ENCOUNTER
Medication Question or Refill    Contacts         Type Contact Phone/Fax    09/06/2023 10:41 AM CDT Phone (Incoming) Otf Bolaños (Self) [phone] (H)     PT wants refills on the following to cover him until his appt in 10/17/2023.    lisinopril (ZESTRIL) 10 MG tablet  aspirin (ASA) 325 MG EC tablet            What medication are you calling about (include dose and sig)?: lisinopril (ZESTRIL) 10 MG tablet    aspirin (ASA) 325 MG EC tablet     Preferred Pharmacy:   [facility] [address] [facility]  [address] [facility] [address]  Phone: [phone] Fax: [phone]      Controlled Substance Agreement on file:   CSA -- Patient Level:    CSA: None found at the patient level.       Who prescribed the medication?: Brian Levy     Do you need a refill? Yes    When did you use the medication last? Use everyday    Patient offered an appointment? No    Do you have any questions or concerns?  No      Okay to leave a detailed message?: Yes at Home number on file [phone] (home)

## 2023-09-08 RX ORDER — ASPIRIN 325 MG
325 TABLET, DELAYED RELEASE (ENTERIC COATED) ORAL DAILY
Qty: 90 TABLET | Refills: 0 | Status: SHIPPED | OUTPATIENT
Start: 2023-09-08 | End: 2023-12-06

## 2023-10-17 ENCOUNTER — OFFICE VISIT (OUTPATIENT)
Dept: FAMILY MEDICINE | Facility: CLINIC | Age: 63
End: 2023-10-17
Payer: COMMERCIAL

## 2023-10-17 VITALS
HEIGHT: 67 IN | TEMPERATURE: 97.6 F | SYSTOLIC BLOOD PRESSURE: 126 MMHG | BODY MASS INDEX: 35.91 KG/M2 | DIASTOLIC BLOOD PRESSURE: 74 MMHG | OXYGEN SATURATION: 96 % | RESPIRATION RATE: 18 BRPM | WEIGHT: 228.8 LBS | HEART RATE: 73 BPM

## 2023-10-17 DIAGNOSIS — Z86.73 HX OF TRANSIENT ISCHEMIC ATTACK (TIA): ICD-10-CM

## 2023-10-17 DIAGNOSIS — R93.2 ABNORMAL FINDING ON IMAGING OF LIVER: ICD-10-CM

## 2023-10-17 DIAGNOSIS — I27.20 PULMONARY HYPERTENSION (H): ICD-10-CM

## 2023-10-17 DIAGNOSIS — E88.2: ICD-10-CM

## 2023-10-17 DIAGNOSIS — Z12.11 SCREEN FOR COLON CANCER: ICD-10-CM

## 2023-10-17 DIAGNOSIS — R73.03 PRE-DIABETES: ICD-10-CM

## 2023-10-17 DIAGNOSIS — E66.01 MORBID OBESITY (H): ICD-10-CM

## 2023-10-17 DIAGNOSIS — I10 BENIGN ESSENTIAL HYPERTENSION: ICD-10-CM

## 2023-10-17 DIAGNOSIS — Z13.6 SCREENING FOR AAA (ABDOMINAL AORTIC ANEURYSM): ICD-10-CM

## 2023-10-17 DIAGNOSIS — Z00.00 ENCOUNTER FOR MEDICARE ANNUAL WELLNESS EXAM: Primary | ICD-10-CM

## 2023-10-17 DIAGNOSIS — J44.9 CHRONIC OBSTRUCTIVE PULMONARY DISEASE, UNSPECIFIED COPD TYPE (H): ICD-10-CM

## 2023-10-17 LAB
ALBUMIN SERPL BCG-MCNC: 4.3 G/DL (ref 3.5–5.2)
ALP SERPL-CCNC: 87 U/L (ref 40–129)
ALT SERPL W P-5'-P-CCNC: 23 U/L (ref 0–70)
ANION GAP SERPL CALCULATED.3IONS-SCNC: 8 MMOL/L (ref 7–15)
AST SERPL W P-5'-P-CCNC: 28 U/L (ref 0–45)
BILIRUB DIRECT SERPL-MCNC: 0.2 MG/DL (ref 0–0.3)
BILIRUB SERPL-MCNC: 0.6 MG/DL
BUN SERPL-MCNC: 16.1 MG/DL (ref 8–23)
CALCIUM SERPL-MCNC: 9.9 MG/DL (ref 8.8–10.2)
CHLORIDE SERPL-SCNC: 101 MMOL/L (ref 98–107)
CREAT SERPL-MCNC: 0.92 MG/DL (ref 0.67–1.17)
DEPRECATED HCO3 PLAS-SCNC: 31 MMOL/L (ref 22–29)
EGFRCR SERPLBLD CKD-EPI 2021: >90 ML/MIN/1.73M2
ERYTHROCYTE [DISTWIDTH] IN BLOOD BY AUTOMATED COUNT: 12.6 % (ref 10–15)
GLUCOSE SERPL-MCNC: 94 MG/DL (ref 70–99)
HBA1C MFR BLD: 5.9 % (ref 0–5.6)
HCT VFR BLD AUTO: 46.9 % (ref 40–53)
HGB BLD-MCNC: 15.1 G/DL (ref 13.3–17.7)
MCH RBC QN AUTO: 29.6 PG (ref 26.5–33)
MCHC RBC AUTO-ENTMCNC: 32.2 G/DL (ref 31.5–36.5)
MCV RBC AUTO: 92 FL (ref 78–100)
PLATELET # BLD AUTO: 147 10E3/UL (ref 150–450)
POTASSIUM SERPL-SCNC: 4.8 MMOL/L (ref 3.4–5.3)
PROT SERPL-MCNC: 7.1 G/DL (ref 6.4–8.3)
RBC # BLD AUTO: 5.1 10E6/UL (ref 4.4–5.9)
SODIUM SERPL-SCNC: 140 MMOL/L (ref 135–145)
WBC # BLD AUTO: 10.9 10E3/UL (ref 4–11)

## 2023-10-17 PROCEDURE — 90480 ADMN SARSCOV2 VAC 1/ONLY CMP: CPT | Performed by: PHYSICIAN ASSISTANT

## 2023-10-17 PROCEDURE — 36415 COLL VENOUS BLD VENIPUNCTURE: CPT | Performed by: PHYSICIAN ASSISTANT

## 2023-10-17 PROCEDURE — G0008 ADMIN INFLUENZA VIRUS VAC: HCPCS | Performed by: PHYSICIAN ASSISTANT

## 2023-10-17 PROCEDURE — 82248 BILIRUBIN DIRECT: CPT | Performed by: PHYSICIAN ASSISTANT

## 2023-10-17 PROCEDURE — 99214 OFFICE O/P EST MOD 30 MIN: CPT | Mod: 25 | Performed by: PHYSICIAN ASSISTANT

## 2023-10-17 PROCEDURE — 80053 COMPREHEN METABOLIC PANEL: CPT | Performed by: PHYSICIAN ASSISTANT

## 2023-10-17 PROCEDURE — 83036 HEMOGLOBIN GLYCOSYLATED A1C: CPT | Performed by: PHYSICIAN ASSISTANT

## 2023-10-17 PROCEDURE — 90662 IIV NO PRSV INCREASED AG IM: CPT | Performed by: PHYSICIAN ASSISTANT

## 2023-10-17 PROCEDURE — 91320 SARSCV2 VAC 30MCG TRS-SUC IM: CPT | Performed by: PHYSICIAN ASSISTANT

## 2023-10-17 PROCEDURE — 85027 COMPLETE CBC AUTOMATED: CPT | Performed by: PHYSICIAN ASSISTANT

## 2023-10-17 PROCEDURE — G0439 PPPS, SUBSEQ VISIT: HCPCS | Performed by: PHYSICIAN ASSISTANT

## 2023-10-17 RX ORDER — RESPIRATORY SYNCYTIAL VIRUS VACCINE 120MCG/0.5
0.5 KIT INTRAMUSCULAR ONCE
Qty: 1 EACH | Refills: 0 | Status: CANCELLED | OUTPATIENT
Start: 2023-10-17 | End: 2023-10-17

## 2023-10-17 ASSESSMENT — ENCOUNTER SYMPTOMS
PALPITATIONS: 0
JOINT SWELLING: 0
HEADACHES: 0
FREQUENCY: 1
DIZZINESS: 0
CHILLS: 0
HEARTBURN: 0
PARESTHESIAS: 0
MYALGIAS: 1
CONSTIPATION: 0
EYE PAIN: 0
ABDOMINAL PAIN: 0
HEMATOCHEZIA: 0
HEMATURIA: 0
DIARRHEA: 0
ARTHRALGIAS: 0
FEVER: 0
COUGH: 0
NERVOUS/ANXIOUS: 0
SORE THROAT: 0
SHORTNESS OF BREATH: 1
DYSURIA: 0
NAUSEA: 0
WEAKNESS: 0

## 2023-10-17 ASSESSMENT — PAIN SCALES - GENERAL: PAINLEVEL: NO PAIN (0)

## 2023-10-17 ASSESSMENT — ACTIVITIES OF DAILY LIVING (ADL): CURRENT_FUNCTION: NO ASSISTANCE NEEDED

## 2023-10-17 NOTE — LETTER
October 18, 2023      Otf Bolaños  [address] [place] [address]        Victoriano Vanessa,   Really good seeing you as always this week.   Labs are back and look overall ok.   Screening of the kidneys, electrolytes and for diabetes looked within the expected ranges - the blood sugars continue to rest in that PRE-diabetes range.   Your red and white blood cells look ok as well however the platelets were slightly low. With your hx of alcohol use, and even though you've now stopped, I really still want you to strongly consider the liver imaging if you're up for it. Let me know any question but for now no other changes are needed.   Marquis Mckinley PA-C     Resulted Orders   Hepatic panel (Albumin, ALT, AST, Bili, Alk Phos, TP)   Result Value Ref Range    Protein Total 7.1 6.4 - 8.3 g/dL    Albumin 4.3 3.5 - 5.2 g/dL    Bilirubin Total 0.6 <=1.2 mg/dL    Alkaline Phosphatase 87 40 - 129 U/L    AST 28 0 - 45 U/L      Comment:      Reference intervals for this test were updated on 6/12/2023 to more accurately reflect our healthy population. There may be differences in the flagging of prior results with similar values performed with this method. Interpretation of those prior results can be made in the context of the updated reference intervals.    ALT 23 0 - 70 U/L      Comment:      Reference intervals for this test were updated on 6/12/2023 to more accurately reflect our healthy population. There may be differences in the flagging of prior results with similar values performed with this method. Interpretation of those prior results can be made in the context of the updated reference intervals.      Bilirubin Direct 0.20 0.00 - 0.30 mg/dL   Basic metabolic panel  (Ca, Cl, CO2, Creat, Gluc, K, Na, BUN)   Result Value Ref Range    Sodium 140 135 - 145 mmol/L      Comment:      Reference intervals for this test were updated on 09/26/2023 to more accurately reflect our healthy population. There may be differences in the  flagging of prior results with similar values performed with this method. Interpretation of those prior results can be made in the context of the updated reference intervals.     Potassium 4.8 3.4 - 5.3 mmol/L    Chloride 101 98 - 107 mmol/L    Carbon Dioxide (CO2) 31 (H) 22 - 29 mmol/L    Anion Gap 8 7 - 15 mmol/L    Urea Nitrogen 16.1 8.0 - 23.0 mg/dL    Creatinine 0.92 0.67 - 1.17 mg/dL    GFR Estimate >90 >60 mL/min/1.73m2    Calcium 9.9 8.8 - 10.2 mg/dL    Glucose 94 70 - 99 mg/dL   CBC with platelets   Result Value Ref Range    WBC Count 10.9 4.0 - 11.0 10e3/uL    RBC Count 5.10 4.40 - 5.90 10e6/uL    Hemoglobin 15.1 13.3 - 17.7 g/dL    Hematocrit 46.9 40.0 - 53.0 %    MCV 92 78 - 100 fL    MCH 29.6 26.5 - 33.0 pg    MCHC 32.2 31.5 - 36.5 g/dL    RDW 12.6 10.0 - 15.0 %    Platelet Count 147 (L) 150 - 450 10e3/uL   Hemoglobin A1c   Result Value Ref Range    Hemoglobin A1C 5.9 (H) 0.0 - 5.6 %      Comment:      Normal <5.7%   Prediabetes 5.7-6.4%    Diabetes 6.5% or higher     Note: Adopted from ADA consensus guidelines.       If you have any questions or concerns, please call the clinic at the number listed above.
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
## 2023-10-17 NOTE — PATIENT INSTRUCTIONS
Shingles vaccine, pneumonia vaccine and RSV vaccine -- CHECK AT YOUR PHARMACY      Patient Education   Personalized Prevention Plan  You are due for the preventive services outlined below.  Your care team is available to assist you in scheduling these services.  If you have already completed any of these items, please share that information with your care team to update in your medical record.  Health Maintenance Due   Topic Date Due    Breathing Capacity Test  Never done    COPD Action Plan  Never done    Colorectal Cancer Screening  Never done    Zoster (Shingles) Vaccine (1 of 2) Never done    Pneumococcal Vaccine (2 - PCV) 02/14/2015    LUNG CANCER SCREENING  06/05/2015    RSV VACCINE 60+ (1 - 1-dose 60+ series) Never done    PHQ-2 (once per calendar year)  01/01/2023    AORTIC ANEURYSM SCREENING (SYSTEM ASSIGNED)  Never done    Flu Vaccine (1) 09/01/2023    COVID-19 Vaccine (6 - 2023-24 season) 09/01/2023    ANNUAL REVIEW OF HM ORDERS  09/26/2023    FALL RISK ASSESSMENT  09/26/2023    Annual Wellness Visit  09/26/2023

## 2023-11-01 ENCOUNTER — HOSPITAL ENCOUNTER (OUTPATIENT)
Dept: ULTRASOUND IMAGING | Facility: CLINIC | Age: 63
Discharge: HOME | End: 2023-11-01
Attending: PHYSICIAN ASSISTANT
Payer: COMMERCIAL

## 2023-11-01 DIAGNOSIS — R93.2 ABNORMAL FINDING ON IMAGING OF LIVER: ICD-10-CM

## 2023-11-01 DIAGNOSIS — Z13.6 SCREENING FOR AAA (ABDOMINAL AORTIC ANEURYSM): ICD-10-CM

## 2023-11-01 PROCEDURE — 76705 ECHO EXAM OF ABDOMEN: CPT

## 2023-11-01 PROCEDURE — 76775 US EXAM ABDO BACK WALL LIM: CPT | Mod: XU

## 2023-11-08 DIAGNOSIS — Z86.73 HISTORY OF TIA (TRANSIENT ISCHEMIC ATTACK): ICD-10-CM

## 2023-11-08 RX ORDER — ATORVASTATIN CALCIUM 40 MG/1
40 TABLET, FILM COATED ORAL EVERY MORNING
Qty: 90 TABLET | Refills: 0 | Status: SHIPPED | OUTPATIENT
Start: 2023-11-08 | End: 2024-02-12

## 2023-12-06 DIAGNOSIS — Z86.73 HISTORY OF TIA (TRANSIENT ISCHEMIC ATTACK): ICD-10-CM

## 2023-12-06 RX ORDER — LISINOPRIL 10 MG/1
TABLET ORAL
Qty: 90 TABLET | Refills: 2 | Status: SHIPPED | OUTPATIENT
Start: 2023-12-06 | End: 2024-09-03

## 2023-12-06 RX ORDER — ASPIRIN 325 MG
325 TABLET, DELAYED RELEASE (ENTERIC COATED) ORAL DAILY
Qty: 90 TABLET | Refills: 2 | Status: SHIPPED | OUTPATIENT
Start: 2023-12-06 | End: 2024-09-03

## 2024-02-12 DIAGNOSIS — Z86.73 HISTORY OF TIA (TRANSIENT ISCHEMIC ATTACK): ICD-10-CM

## 2024-02-12 RX ORDER — ATORVASTATIN CALCIUM 40 MG/1
40 TABLET, FILM COATED ORAL EVERY MORNING
Qty: 90 TABLET | Refills: 2 | Status: SHIPPED | OUTPATIENT
Start: 2024-02-12

## 2024-06-07 DIAGNOSIS — I27.20 PULMONARY HYPERTENSION (H): ICD-10-CM

## 2024-06-07 DIAGNOSIS — J44.9 CHRONIC OBSTRUCTIVE PULMONARY DISEASE, UNSPECIFIED COPD TYPE (H): ICD-10-CM

## 2024-06-07 RX ORDER — ALBUTEROL SULFATE 90 UG/1
2 AEROSOL, METERED RESPIRATORY (INHALATION) EVERY 6 HOURS
Qty: 8.5 G | Refills: 1 | Status: SHIPPED | OUTPATIENT
Start: 2024-06-07

## 2024-07-30 ENCOUNTER — TELEPHONE (OUTPATIENT)
Dept: FAMILY MEDICINE | Facility: CLINIC | Age: 64
End: 2024-07-30
Payer: COMMERCIAL

## 2024-07-30 ENCOUNTER — MEDICAL CORRESPONDENCE (OUTPATIENT)
Dept: HEALTH INFORMATION MANAGEMENT | Facility: CLINIC | Age: 64
End: 2024-07-30

## 2024-07-30 NOTE — TELEPHONE ENCOUNTER
Forms    Type of form/letter: Home oxygen      Do we have the form/letter: Yes: Annual Oxy Order     Who is the form from? [facility], [facility]    Where did/will the form come from? form was faxed in    When is form/letter needed by: asap    How would you like the form/letter returned: Fax : [phone]    Patient Notified form requests are processed in 5-7 business days:Yes    Okay to leave a detailed message?: Yes at Other phone number:  [phone]

## 2024-07-30 NOTE — TELEPHONE ENCOUNTER
Form has been faxed to the location and number listed below.     Dina Fritz     [facility]

## 2024-09-01 DIAGNOSIS — Z86.73 HISTORY OF TIA (TRANSIENT ISCHEMIC ATTACK): ICD-10-CM

## 2024-09-03 RX ORDER — LISINOPRIL 10 MG/1
TABLET ORAL
Qty: 90 TABLET | Refills: 2 | Status: SHIPPED | OUTPATIENT
Start: 2024-09-03

## 2024-09-03 RX ORDER — ASPIRIN 325 MG
325 TABLET, DELAYED RELEASE (ENTERIC COATED) ORAL DAILY
Qty: 90 TABLET | Refills: 2 | Status: SHIPPED | OUTPATIENT
Start: 2024-09-03

## 2024-09-17 ENCOUNTER — PATIENT OUTREACH (OUTPATIENT)
Dept: CARE COORDINATION | Facility: CLINIC | Age: 64
End: 2024-09-17
Payer: COMMERCIAL

## 2024-10-01 ENCOUNTER — PATIENT OUTREACH (OUTPATIENT)
Dept: CARE COORDINATION | Facility: CLINIC | Age: 64
End: 2024-10-01
Payer: COMMERCIAL

## 2024-10-19 ENCOUNTER — IMMUNIZATION (OUTPATIENT)
Dept: FAMILY MEDICINE | Facility: CLINIC | Age: 64
End: 2024-10-19
Payer: COMMERCIAL

## 2024-10-19 DIAGNOSIS — Z23 NEED FOR PROPHYLACTIC VACCINATION AND INOCULATION AGAINST INFLUENZA: Primary | ICD-10-CM

## 2024-10-19 DIAGNOSIS — Z23 HIGH PRIORITY FOR 2019-NCOV VACCINE: ICD-10-CM

## 2024-10-19 PROCEDURE — 90662 IIV NO PRSV INCREASED AG IM: CPT

## 2024-10-19 PROCEDURE — 90480 ADMN SARSCOV2 VAC 1/ONLY CMP: CPT

## 2024-10-19 PROCEDURE — 91320 SARSCV2 VAC 30MCG TRS-SUC IM: CPT

## 2024-10-19 PROCEDURE — G0008 ADMIN INFLUENZA VIRUS VAC: HCPCS

## 2024-10-24 ASSESSMENT — PATIENT HEALTH QUESTIONNAIRE - PHQ9: SUM OF ALL RESPONSES TO PHQ QUESTIONS 1-9: 9

## 2024-11-06 SDOH — HEALTH STABILITY: PHYSICAL HEALTH
ON AVERAGE, HOW MANY DAYS PER WEEK DO YOU ENGAGE IN MODERATE TO STRENUOUS EXERCISE (LIKE A BRISK WALK)?: PATIENT DECLINED

## 2024-11-06 ASSESSMENT — SOCIAL DETERMINANTS OF HEALTH (SDOH): HOW OFTEN DO YOU GET TOGETHER WITH FRIENDS OR RELATIVES?: PATIENT DECLINED

## 2024-11-07 DIAGNOSIS — I27.20 PULMONARY HYPERTENSION (H): ICD-10-CM

## 2024-11-07 DIAGNOSIS — J44.9 CHRONIC OBSTRUCTIVE PULMONARY DISEASE, UNSPECIFIED COPD TYPE (H): ICD-10-CM

## 2024-11-07 DIAGNOSIS — Z86.73 HISTORY OF TIA (TRANSIENT ISCHEMIC ATTACK): ICD-10-CM

## 2024-11-07 RX ORDER — ATORVASTATIN CALCIUM 40 MG/1
40 TABLET, FILM COATED ORAL EVERY MORNING
Qty: 90 TABLET | Refills: 2 | Status: SHIPPED | OUTPATIENT
Start: 2024-11-07

## 2024-11-07 RX ORDER — ALBUTEROL SULFATE 90 UG/1
INHALANT RESPIRATORY (INHALATION)
Qty: 8.5 G | Refills: 1 | Status: SHIPPED | OUTPATIENT
Start: 2024-11-07

## 2024-11-11 ENCOUNTER — OFFICE VISIT (OUTPATIENT)
Dept: FAMILY MEDICINE | Facility: CLINIC | Age: 64
End: 2024-11-11
Payer: COMMERCIAL

## 2024-11-11 VITALS
HEIGHT: 66 IN | WEIGHT: 223 LBS | SYSTOLIC BLOOD PRESSURE: 97 MMHG | TEMPERATURE: 97.6 F | OXYGEN SATURATION: 96 % | BODY MASS INDEX: 35.84 KG/M2 | DIASTOLIC BLOOD PRESSURE: 67 MMHG | HEART RATE: 80 BPM | RESPIRATION RATE: 20 BRPM

## 2024-11-11 DIAGNOSIS — J43.9 PULMONARY EMPHYSEMA, UNSPECIFIED EMPHYSEMA TYPE (H): ICD-10-CM

## 2024-11-11 DIAGNOSIS — I27.20 PULMONARY HYPERTENSION (H): ICD-10-CM

## 2024-11-11 DIAGNOSIS — Z00.00 ENCOUNTER FOR MEDICARE ANNUAL WELLNESS EXAM: Primary | ICD-10-CM

## 2024-11-11 DIAGNOSIS — R73.03 PRE-DIABETES: ICD-10-CM

## 2024-11-11 DIAGNOSIS — K74.60 CIRRHOSIS OF LIVER WITHOUT ASCITES, UNSPECIFIED HEPATIC CIRRHOSIS TYPE (H): ICD-10-CM

## 2024-11-11 DIAGNOSIS — Z86.73 HISTORY OF TIA (TRANSIENT ISCHEMIC ATTACK): ICD-10-CM

## 2024-11-11 DIAGNOSIS — E66.01 MORBID OBESITY (H): ICD-10-CM

## 2024-11-11 DIAGNOSIS — Z12.11 SCREEN FOR COLON CANCER: ICD-10-CM

## 2024-11-11 DIAGNOSIS — Z12.5 SCREENING FOR PROSTATE CANCER: ICD-10-CM

## 2024-11-11 DIAGNOSIS — I10 BENIGN ESSENTIAL HYPERTENSION: ICD-10-CM

## 2024-11-11 DIAGNOSIS — E88.2: ICD-10-CM

## 2024-11-11 LAB
EST. AVERAGE GLUCOSE BLD GHB EST-MCNC: 120 MG/DL
HBA1C MFR BLD: 5.8 % (ref 0–5.6)

## 2024-11-11 PROCEDURE — G0103 PSA SCREENING: HCPCS | Performed by: PHYSICIAN ASSISTANT

## 2024-11-11 PROCEDURE — 85027 COMPLETE CBC AUTOMATED: CPT | Performed by: PHYSICIAN ASSISTANT

## 2024-11-11 PROCEDURE — 99214 OFFICE O/P EST MOD 30 MIN: CPT | Mod: 25 | Performed by: PHYSICIAN ASSISTANT

## 2024-11-11 PROCEDURE — 36415 COLL VENOUS BLD VENIPUNCTURE: CPT | Performed by: PHYSICIAN ASSISTANT

## 2024-11-11 PROCEDURE — 82248 BILIRUBIN DIRECT: CPT | Performed by: PHYSICIAN ASSISTANT

## 2024-11-11 PROCEDURE — 83036 HEMOGLOBIN GLYCOSYLATED A1C: CPT | Performed by: PHYSICIAN ASSISTANT

## 2024-11-11 PROCEDURE — G0439 PPPS, SUBSEQ VISIT: HCPCS | Performed by: PHYSICIAN ASSISTANT

## 2024-11-11 PROCEDURE — 80053 COMPREHEN METABOLIC PANEL: CPT | Performed by: PHYSICIAN ASSISTANT

## 2024-11-11 PROCEDURE — 80061 LIPID PANEL: CPT | Performed by: PHYSICIAN ASSISTANT

## 2024-11-11 ASSESSMENT — PAIN SCALES - GENERAL: PAINLEVEL_OUTOF10: NO PAIN (0)

## 2024-11-11 NOTE — PROGRESS NOTES
Preventive Care Visit  [facility] RUBEN Mckinley PA-C, Family Medicine  Nov 11, 2024      Assessment & Plan     Encounter for Medicare annual wellness exam  Reviewed personal and family history. Reviewed age appropriate screenings. Recommended any needed vaccinations. He continues to decline any cancer screening     Pulmonary hypertension (H)Pulmonary emphysema, unspecified emphysema type (H)  He continues to require at minimum nocturnal O2 supplementation along with periodic use when active; I again recommended he consider inhaler addition and he was receptive to trying. Discussed r/b/se  - budeson-glycopyrrol-formoterol (BREZTRI AEROSPHERE) 160-9-4.8 MCG/ACT AERO inhaler; Inhale 2 puffs into the lungs 2 times daily.    Benign essential hypertension  Controlled. Continue present management.   - Basic metabolic panel  (Ca, Cl, CO2, Creat, Gluc, K, Na, BUN); Future  - Basic metabolic panel  (Ca, Cl, CO2, Creat, Gluc, K, Na, BUN)    History of TIA (transient ischemic attack)  Continue risk factor control and gdmt  - Lipid panel reflex to direct LDL Fasting; Future  - Lipid panel reflex to direct LDL Fasting    Pre-diabetes  Stable. Continue present management.   - Hemoglobin A1c; Future  - Hemoglobin A1c    Obesity (BMI 35.0-39.9) with comorbidity (H)  Continue focus on diet/exercise to control bp, lipids and sugars    Screening for prostate cancer  - PSA, screen; Future  - PSA, screen    Screen for colon cancer  declines    Multiple symmetrical lipomatosis  Considering return to [place] at this time    Cirrhosis of liver without ascites, unspecified hepatic cirrhosis type (H)  He declines any further imaging or work up. We'll update FIB4 which has actually improved. Continue etoh avoidance and weight loss focus  FIB-4 Calculation: 2.02 at 11/11/2024  2:36 PM  Calculated from:  SGOT/AST: 20 U/L at 11/11/2024  2:36 PM  SGPT/ALT: 18 U/L at 11/11/2024  2:36 PM  Platelets: 154 10e3/uL at  "11/11/2024  2:36 PM  Age: 64 years    - Hepatic panel (Albumin, ALT, AST, Bili, Alk Phos, TP); Future  - Hepatic panel (Albumin, ALT, AST, Bili, Alk Phos, TP)  - CBC with platelets          BMI  Estimated body mass index is 35.99 kg/m  as calculated from the following:    Height as of this encounter: 1.676 m (5' 6\").    Weight as of this encounter: 101.2 kg (223 lb).       Counseling  Appropriate preventive services were addressed with this patient via screening, questionnaire, or discussion as appropriate for fall prevention, nutrition, physical activity, Tobacco-use cessation, social engagement, weight loss and cognition.  Checklist reviewing preventive services available has been given to the patient.  Reviewed patient's diet, addressing concerns and/or questions.   The patient was instructed to see the dentist every 6 months.   He is at risk for psychosocial distress and has been provided with information to reduce risk.   Discussed possible causes of fatigue.         Josette Vanessa is a 64 year old, presenting for the following:  Medicare Visit and Blood Draw (LABS: Patient IS fasting)        11/11/2024     1:43 PM   Additional Questions   Roomed by Teodora         11/11/2024     1:43 PM   Patient Reported Additional Medications   Patient reports taking the following new medications none          HPI    Otf Bolaños is a 64 year old male who presents today for annual wellness   Overall the past year has gone well; everything has \"been about the same\"  Family stressors with daughter and son are in a better place    He has tried to improve the diet lately; laying off more greasy foods and adding in more veggies/fruits  Trying to exercise a bit more but with his O2 if he is going more thorough    Uses O2 if going longer in the grocery store/[employer]; mentions a lot easier if he uses a cart but tries not to  Still using 2.5L at night  Breathing is stable; not getting any worse          Health Care " Directive  Patient does not have a Health Care Directive: Discussed advance care planning with patient; however, patient declined at this time.      11/6/2024   General Health   How would you rate your overall physical health? (!) POOR   Feel stress (tense, anxious, or unable to sleep) To some extent      (!) STRESS CONCERN      11/6/2024   Nutrition   Diet: I don't know            11/6/2024   Exercise   Days per week of moderate/strenous exercise Patient declined            11/6/2024   Social Factors   Frequency of gathering with friends or relatives Patient declined   Worry food won't last until get money to buy more No   Food not last or not have enough money for food? No   Do you have housing? (Housing is defined as stable permanent housing and does not include staying ouside in a car, in a tent, in an abandoned building, in an overnight shelter, or couch-surfing.) Yes   Are you worried about losing your housing? No   Lack of transportation? No   Unable to get utilities (heat,electricity)? No            11/11/2024   Fall Risk   Fallen 2 or more times in the past year? No    Trouble with walking or balance? No        Patient-reported          11/6/2024   Activities of Daily Living- Home Safety   Needs help with the following daily activites None of the above   Safety concerns in the home None of the above            11/6/2024   Dental   Dentist two times every year? (!) NO            11/6/2024   Hearing Screening   Hearing concerns? None of the above            11/6/2024   Driving Risk Screening   Patient/family members have concerns about driving No            11/6/2024   General Alertness/Fatigue Screening   Have you been more tired than usual lately? (!) YES just a little; no major change            11/6/2024   Urinary Incontinence Screening   Bothered by leaking urine in past 6 months No            11/6/2024   TB Screening   Were you born outside of the [place]? No            Today's PHQ-2 Score:       11/11/2024      1:31 PM   PHQ-2 ( 1999 [employer])   Q1: Little interest or pleasure in doing things 0    Q2: Feeling down, depressed or hopeless 0    PHQ-2 Score 0    Q1: Little interest or pleasure in doing things Not at all   Q2: Feeling down, depressed or hopeless Not at all   PHQ-2 Score 0       Patient-reported           11/6/2024   Substance Use   Alcohol more than 3/day or more than 7/wk No   Do you have a current opioid prescription? No   How severe/bad is pain from 1 to 10? 4/10   Do you use any other substances recreationally? No    (!) DECLINE       Multiple values from one day are sorted in reverse-chronological order     Social History     Tobacco Use    Smoking status: Former     Current packs/day: 0.00     Average packs/day: 2.0 packs/day for 30.0 years (60.0 ttl pk-yrs)     Types: Cigarettes     Start date: 1/14/1984     Quit date: 1/14/2014     Years since quitting: 10.8     Passive exposure: Past    Smokeless tobacco: Never   Vaping Use    Vaping status: Never Used   Substance Use Topics    Alcohol use: Not Currently    Drug use: No           11/6/2024   AAA Screening   Family history of Abdominal Aortic Aneurysm (AAA)? Unsure      Last PSA:   PSA   Date Value Ref Range Status   02/05/2018 0.45 0 - 4 ug/L Final     Comment:     Assay Method:  Chemiluminescence using Siemens Vista analyzer     Prostate Specific Antigen Screen   Date Value Ref Range Status   09/26/2022 0.89 0.00 - 4.00 ug/L Final     ASCVD Risk   The ASCVD Risk score (Neymar ARMENTA, et al., 2019) failed to calculate for the following reasons:    Risk score cannot be calculated because patient has a medical history suggesting prior/existing ASCVD            Reviewed and updated as needed this visit by Provider                    Lab work is in process  Labs reviewed in EPIC  Current providers sharing in care for this patient include:  Patient Care Team:  Brian Mckinley PA-C as PCP - General (Physician Assistant)  Brian Mckinley,  "PA-C as Assigned PCP    The following health maintenance items are reviewed in Epic and correct as of today:  Health Maintenance   Topic Date Due    SPIROMETRY  Never done    COPD ACTION PLAN  Never done    COLORECTAL CANCER SCREENING  Never done    ZOSTER IMMUNIZATION (1 of 2) Never done    LUNG CANCER SCREENING  06/05/2015    LIPID  09/26/2023    DTAP/TDAP/TD IMMUNIZATION (3 - Td or Tdap) 12/02/2023    BMP  10/17/2024    ANNUAL REVIEW OF HM ORDERS  10/17/2024    MEDICARE ANNUAL WELLNESS VISIT  10/17/2024    FALL RISK ASSESSMENT  11/11/2025    GLUCOSE  10/17/2026    ADVANCE CARE PLANNING  10/18/2028    HEPATITIS C SCREENING  Completed    PHQ-2 (once per calendar year)  Completed    INFLUENZA VACCINE  Completed    Pneumococcal Vaccine: 65+ Years  Completed    RSV VACCINE  Completed    COVID-19 Vaccine  Completed    HPV IMMUNIZATION  Aged Out    MENINGITIS IMMUNIZATION  Aged Out    RSV MONOCLONAL ANTIBODY  Aged Out         Review of Systems  Constitutional, HEENT, cardiovascular, pulmonary, gi and gu systems are negative, except as otherwise noted.     Objective    Exam  BP 97/67 (BP Location: Right arm, Patient Position: Sitting, Cuff Size: Adult Large)   Pulse 80   Temp 97.6  F (36.4  C) (Oral)   Resp 20   Ht 1.676 m (5' 6\")   Wt 101.2 kg (223 lb)   SpO2 96%   BMI 35.99 kg/m     Estimated body mass index is 35.99 kg/m  as calculated from the following:    Height as of this encounter: 1.676 m (5' 6\").    Weight as of this encounter: 101.2 kg (223 lb).    Physical Exam  GENERAL: alert and no distress  EYES: Eyes grossly normal to inspection, PERRL and conjunctivae and sclerae normal  HENT: ear canals and TM's normal, nose and mouth without ulcers or lesions  NECK: no adenopathy, no asymmetry, masses, or scars  RESP: prolonged expiratory phase; clear otherwise  CV: regular rate and rhythm, normal S1 S2, no S3 or S4, no murmur, click or rub, no peripheral edema  ABDOMEN: soft, nontender, no hepatosplenomegaly, " no masses and bowel sounds normal  MS: No peripheral edema   SKIN: excess tissue surrounding neck and buffalo hump  PSYCH: mentation appears normal, affect normal/bright        11/11/2024   Mini Cog   Clock Draw Score 2 Normal   3 Item Recall 3 objects recalled   Mini Cog Total Score 5            Vision Screen         Signed Electronically by: Brian Mckinley PA-C

## 2024-11-11 NOTE — LETTER
November 12, 2024      Otf Bolaños  [address] [place] [address]        Victoriano Vanessa - good seeing you this week. Labs are back and overall look good.     The cholesterol continues to be well controlled. The PSA, screening the prostate, looked healthy. Screening of the kidneys, electrolytes and for diabetes looked within the expected ranges. The liver enzymes look healthy as well. And finally, the PRE-diabetes levels are stable.       Please let me know any questions and about the new inhaler.       Marquis   Ernestine Orders   Lipid panel reflex to direct LDL Fasting   Result Value Ref Range    Cholesterol 110 <200 mg/dL    Triglycerides 102 <150 mg/dL    Direct Measure HDL 53 >=40 mg/dL    LDL Cholesterol Calculated 37 <100 mg/dL    Non HDL Cholesterol 57 <130 mg/dL    Patient Fasting > 8hrs? Yes     Narrative    Cholesterol  Desirable: < 200 mg/dL  Borderline High: 200 - 239 mg/dL  High: >= 240 mg/dL    Triglycerides  Normal: < 150 mg/dL  Borderline High: 150 - 199 mg/dL  High: 200-499 mg/dL  Very High: >= 500 mg/dL    Direct Measure HDL  Female: >= 50 mg/dL   Male: >= 40 mg/dL    LDL Cholesterol  Desirable: < 100 mg/dL  Above Desirable: 100 - 129 mg/dL   Borderline High: 130 - 159 mg/dL   High:  160 - 189 mg/dL   Very High: >= 190 mg/dL    Non HDL Cholesterol  Desirable: < 130 mg/dL  Above Desirable: 130 - 159 mg/dL  Borderline High: 160 - 189 mg/dL  High: 190 - 219 mg/dL  Very High: >= 220 mg/dL   Hemoglobin A1c   Result Value Ref Range    Estimated Average Glucose 120 (H) <117 mg/dL    Hemoglobin A1C 5.8 (H) 0.0 - 5.6 %      Comment:      Normal <5.7%   Prediabetes 5.7-6.4%    Diabetes 6.5% or higher     Note: Adopted from ADA consensus guidelines.   PSA, screen   Result Value Ref Range    Prostate Specific Antigen Screen 0.99 0.00 - 4.50 ng/mL    Narrative    This result is obtained using the Roche Elecsys total PSA method on the yanci e801 immunoassay analyzer, which is an ultrasensitive method.  Results obtained with different assay methods or kits cannot be used interchangeably.  This test is intended for initial prostate cancer screening. PSA values exceeding the age-specific limits are suspicious for prostate disease, but additional testing, such as prostate biopsy, is needed to diagnose prostate pathology. The American Cancer Society recommends annual examination with digital rectal examination and serum PSA beginning at age 50 and for men with a life expectancy of at least 10 years after detection of prostate cancer. For men in high-risk groups, such as  Americans or men with a first-degree relative diagnosed at a younger age, testing should begin at a younger age. It is generally recommended that information be provided to patients about the benefits and limitations of testing and treatment so they can make informed decisions.   Basic metabolic panel  (Ca, Cl, CO2, Creat, Gluc, K, Na, BUN)   Result Value Ref Range    Sodium 138 135 - 145 mmol/L    Potassium 4.3 3.4 - 5.3 mmol/L    Chloride 99 98 - 107 mmol/L    Carbon Dioxide (CO2) 29 22 - 29 mmol/L    Anion Gap 10 7 - 15 mmol/L    Urea Nitrogen 15.2 8.0 - 23.0 mg/dL    Creatinine 0.91 0.67 - 1.17 mg/dL    GFR Estimate >90 >60 mL/min/1.73m2      Comment:      eGFR calculated using 2021 CKD-EPI equation.    Calcium 9.6 8.8 - 10.4 mg/dL      Comment:      Reference intervals for this test were updated on 7/16/2024 to reflect our healthy population more accurately. There may be differences in the flagging of prior results with similar values performed with this method. Those prior results can be interpreted in the context of the updated reference intervals.    Glucose 84 70 - 99 mg/dL    Patient Fasting > 8hrs? Yes    Hepatic panel (Albumin, ALT, AST, Bili, Alk Phos, TP)   Result Value Ref Range    Protein Total 7.2 6.4 - 8.3 g/dL    Albumin 4.3 3.5 - 5.2 g/dL    Bilirubin Total 0.5 <=1.2 mg/dL    Alkaline Phosphatase 92 40 - 150 U/L    AST 20 0 -  45 U/L    ALT 18 0 - 70 U/L    Bilirubin Direct <0.20 0.00 - 0.30 mg/dL       If you have any questions or concerns, please call the clinic at the number listed above.       Sincerely,      [facility] Fanny Fritz

## 2024-11-11 NOTE — PATIENT INSTRUCTIONS
Please let me know about coverage on the inhaler; other options would be Trelegy.     If the combo meds are not helpful, we can consider single inhalers in the LABA or LAMA category        Patient Education   Preventive Care Advice   This is general advice given by our system to help you stay healthy. However, your care team may have specific advice just for you. Please talk to your care team about your preventive care needs.  Nutrition  Eat 5 or more servings of fruits and vegetables each day.  Try wheat bread, brown rice and whole grain pasta (instead of white bread, rice, and pasta).  Get enough calcium and vitamin D. Check the label on foods and aim for 100% of the RDA (recommended daily allowance).  Lifestyle  Exercise at least 150 minutes each week  (30 minutes a day, 5 days a week).  Do muscle strengthening activities 2 days a week. These help control your weight and prevent disease.  No smoking.  Wear sunscreen to prevent skin cancer.  Have a dental exam and cleaning every 6 months.  Yearly exams  See your health care team every year to talk about:  Any changes in your health.  Any medicines your care team has prescribed.  Preventive care, family planning, and ways to prevent chronic diseases.  Shots (vaccines)   HPV shots (up to age 26), if you've never had them before.  Hepatitis B shots (up to age 59), if you've never had them before.  COVID-19 shot: Get this shot when it's due.  Flu shot: Get a flu shot every year.  Tetanus shot: Get a tetanus shot every 10 years.  Pneumococcal, hepatitis A, and RSV shots: Ask your care team if you need these based on your risk.  Shingles shot (for age 50 and up)  General health tests  Diabetes screening:  Starting at age 35, Get screened for diabetes at least every 3 years.  If you are younger than age 35, ask your care team if you should be screened for diabetes.  Cholesterol test: At age 39, start having a cholesterol test every 5 years, or more often if  advised.  Bone density scan (DEXA): At age 50, ask your care team if you should have this scan for osteoporosis (brittle bones).  Hepatitis C: Get tested at least once in your life.  STIs (sexually transmitted infections)  Before age 24: Ask your care team if you should be screened for STIs.  After age 24: Get screened for STIs if you're at risk. You are at risk for STIs (including HIV) if:  You are sexually active with more than one person.  You don't use condoms every time.  You or a partner was diagnosed with a sexually transmitted infection.  If you are at risk for HIV, ask about PrEP medicine to prevent HIV.  Get tested for HIV at least once in your life, whether you are at risk for HIV or not.  Cancer screening tests  Cervical cancer screening: If you have a cervix, begin getting regular cervical cancer screening tests starting at age 21.  Breast cancer scan (mammogram): If you've ever had breasts, begin having regular mammograms starting at age 40. This is a scan to check for breast cancer.  Colon cancer screening: It is important to start screening for colon cancer at age 45.  Have a colonoscopy test every 10 years (or more often if you're at risk) Or, ask your provider about stool tests like a FIT test every year or Cologuard test every 3 years.  To learn more about your testing options, visit:   .  For help making a decision, visit:   https://bit.ly/tx09536.  Prostate cancer screening test: If you have a prostate, ask your care team if a prostate cancer screening test (PSA) at age 55 is right for you.  Lung cancer screening: If you are a current or former smoker ages 50 to 80, ask your care team if ongoing lung cancer screenings are right for you.  For informational purposes only. Not to replace the advice of your health care provider. Copyright   2023 [employer]. All rights reserved. Clinically reviewed by the [facility]. [employer] 505287 - REV 01/24.  Learning  About Sleeping Well  What does sleeping well mean?     Sleeping well means getting enough sleep to feel good and stay healthy. How much sleep is enough varies among people.  The number of hours you sleep and how you feel when you wake up are both important. If you do not feel refreshed, you probably need more sleep. Another sign of not getting enough sleep is feeling tired during the day.  Experts recommend that adults get at least 7 or more hours of sleep per day. Children and older adults need more sleep.  Why is getting enough sleep important?  Getting enough quality sleep is a basic part of good health. When your sleep suffers, your physical health, mood, and your thoughts can suffer too. You may find yourself feeling more grumpy or stressed. Not getting enough sleep also can lead to serious problems, including injury, accidents, anxiety, and depression.  What might cause poor sleeping?  Many things can cause sleep problems, including:  Changes to your sleep schedule.  Stress. Stress can be caused by fear about a single event, such as giving a speech. Or you may have ongoing stress, such as worry about work or school.  Depression, anxiety, and other mental or emotional conditions.  Changes in your sleep habits or surroundings. This includes changes that happen where you sleep, such as noise, light, or sleeping in a different bed. It also includes changes in your sleep pattern, such as having jet lag or working a late shift.  Health problems, such as pain, breathing problems, and restless legs syndrome.  Lack of regular exercise.  Using alcohol, nicotine, or caffeine before bed.  How can you help yourself?  Here are some tips that may help you sleep more soundly and wake up feeling more refreshed.  Your sleeping area   Use your bedroom only for sleeping and sex. A bit of light reading may help you fall asleep. But if it doesn't, do your reading elsewhere in the house. Try not to use your TV, computer, smartphone,  "or tablet while you are in bed.  Be sure your bed is big enough to stretch out comfortably, especially if you have a sleep partner.  Keep your bedroom quiet, dark, and cool. Use curtains, blinds, or a sleep mask to block out light. To block out noise, use earplugs, soothing music, or a \"white noise\" machine.  Your evening and bedtime routine   Create a relaxing bedtime routine. You might want to take a warm shower or bath, or listen to soothing music.  Go to bed at the same time every night. And get up at the same time every morning, even if you feel tired.  What to avoid   Limit caffeine (coffee, tea, caffeinated sodas) during the day, and don't have any for at least 6 hours before bedtime.  Avoid drinking alcohol before bedtime. Alcohol can cause you to wake up more often during the night.  Try not to smoke or use tobacco, especially in the evening. Nicotine can keep you awake.  Limit naps during the day, especially close to bedtime.  Avoid lying in bed awake for too long. If you can't fall asleep or if you wake up in the middle of the night and can't get back to sleep within about 20 minutes, get out of bed and go to another room until you feel sleepy.  Avoid taking medicine right before bed that may keep you awake or make you feel hyper or energized. Your doctor can tell you if your medicine may do this and if you can take it earlier in the day.  If you can't sleep   Imagine yourself in a peaceful, pleasant scene. Focus on the details and feelings of being in a place that is relaxing.  Get up and do a quiet or boring activity until you feel sleepy.  Avoid drinking any liquids before going to bed to help prevent waking up often to use the bathroom.  Where can you learn more?  Go to https://www.[employer].net/patiented  Enter J942 in the search box to learn more about \"Learning About Sleeping Well.\"  Current as of: July 10, 2023  Content Version: 14.2 2024 [facility] [employer].   Care instructions adapted " under license by your healthcare professional. If you have questions about a medical condition or this instruction, always ask your healthcare professional. [employer] disclaims any warranty or liability for your use of this information.

## 2024-11-12 DIAGNOSIS — K74.60 CIRRHOSIS OF LIVER WITHOUT ASCITES, UNSPECIFIED HEPATIC CIRRHOSIS TYPE (H): Primary | ICD-10-CM

## 2024-11-12 LAB
ALBUMIN SERPL BCG-MCNC: 4.3 G/DL (ref 3.5–5.2)
ALP SERPL-CCNC: 92 U/L (ref 40–150)
ALT SERPL W P-5'-P-CCNC: 18 U/L (ref 0–70)
ANION GAP SERPL CALCULATED.3IONS-SCNC: 10 MMOL/L (ref 7–15)
AST SERPL W P-5'-P-CCNC: 20 U/L (ref 0–45)
BILIRUB DIRECT SERPL-MCNC: <0.2 MG/DL (ref 0–0.3)
BILIRUB SERPL-MCNC: 0.5 MG/DL
BUN SERPL-MCNC: 15.2 MG/DL (ref 8–23)
CALCIUM SERPL-MCNC: 9.6 MG/DL (ref 8.8–10.4)
CHLORIDE SERPL-SCNC: 99 MMOL/L (ref 98–107)
CHOLEST SERPL-MCNC: 110 MG/DL
CREAT SERPL-MCNC: 0.91 MG/DL (ref 0.67–1.17)
EGFRCR SERPLBLD CKD-EPI 2021: >90 ML/MIN/1.73M2
ERYTHROCYTE [DISTWIDTH] IN BLOOD BY AUTOMATED COUNT: 12.8 % (ref 10–15)
FASTING STATUS PATIENT QL REPORTED: YES
FASTING STATUS PATIENT QL REPORTED: YES
GLUCOSE SERPL-MCNC: 84 MG/DL (ref 70–99)
HCO3 SERPL-SCNC: 29 MMOL/L (ref 22–29)
HCT VFR BLD AUTO: 46.2 % (ref 40–53)
HDLC SERPL-MCNC: 53 MG/DL
HGB BLD-MCNC: 14.8 G/DL (ref 13.3–17.7)
LDLC SERPL CALC-MCNC: 37 MG/DL
MCH RBC QN AUTO: 28.5 PG (ref 26.5–33)
MCHC RBC AUTO-ENTMCNC: 32 G/DL (ref 31.5–36.5)
MCV RBC AUTO: 89 FL (ref 78–100)
NONHDLC SERPL-MCNC: 57 MG/DL
PLATELET # BLD AUTO: 154 10E3/UL (ref 150–450)
POTASSIUM SERPL-SCNC: 4.3 MMOL/L (ref 3.4–5.3)
PROT SERPL-MCNC: 7.2 G/DL (ref 6.4–8.3)
PSA SERPL DL<=0.01 NG/ML-MCNC: 0.99 NG/ML (ref 0–4.5)
RBC # BLD AUTO: 5.19 10E6/UL (ref 4.4–5.9)
SODIUM SERPL-SCNC: 138 MMOL/L (ref 135–145)
TRIGL SERPL-MCNC: 102 MG/DL
WBC # BLD AUTO: 9 10E3/UL (ref 4–11)

## 2024-12-13 ENCOUNTER — TELEPHONE (OUTPATIENT)
Dept: FAMILY MEDICINE | Facility: CLINIC | Age: 64
End: 2024-12-13

## 2024-12-13 NOTE — TELEPHONE ENCOUNTER
Patient Quality Outreach    Patient is due for the following:   Colon Cancer Screening    Action(s) Taken:   No follow up needed at this time.    Type of outreach:    Sent [employer] message.    Questions for provider review:    None           Sylvia Guthrie MA

## 2024-12-24 DIAGNOSIS — I27.20 PULMONARY HYPERTENSION (H): ICD-10-CM

## 2024-12-24 DIAGNOSIS — J44.9 CHRONIC OBSTRUCTIVE PULMONARY DISEASE, UNSPECIFIED COPD TYPE (H): ICD-10-CM

## 2024-12-24 RX ORDER — ALBUTEROL SULFATE 90 UG/1
INHALANT RESPIRATORY (INHALATION)
Qty: 8.5 G | Refills: 1 | Status: SHIPPED | OUTPATIENT
Start: 2024-12-24

## 2025-05-26 DIAGNOSIS — Z86.73 HISTORY OF TIA (TRANSIENT ISCHEMIC ATTACK): ICD-10-CM

## 2025-05-27 RX ORDER — SENNOSIDES 8.6 MG
325 CAPSULE ORAL DAILY
Qty: 90 TABLET | Refills: 0 | Status: SHIPPED | OUTPATIENT
Start: 2025-05-27

## 2025-05-27 RX ORDER — LISINOPRIL 10 MG/1
10 TABLET ORAL
Qty: 90 TABLET | Refills: 1 | Status: SHIPPED | OUTPATIENT
Start: 2025-05-27

## 2025-06-02 DIAGNOSIS — Z86.73 HISTORY OF TIA (TRANSIENT ISCHEMIC ATTACK): ICD-10-CM

## 2025-06-02 RX ORDER — LISINOPRIL 10 MG/1
10 TABLET ORAL
Qty: 90 TABLET | Refills: 2 | OUTPATIENT
Start: 2025-06-02

## 2025-07-30 ENCOUNTER — TELEPHONE (OUTPATIENT)
Dept: FAMILY MEDICINE | Facility: CLINIC | Age: 65
End: 2025-07-30
Payer: COMMERCIAL

## 2025-07-30 NOTE — TELEPHONE ENCOUNTER
Forms/Letter Request    Type of form/letter: OTHER: Equipment/Services       Do we have the form/letter: Yes:  basket    Who is the form from? [facility]     Where did/will the form come from? form was faxed in    When is form/letter needed by: ASAP    How would you like the form/letter returned: Fax : [phone]    Dedra Cleveland Patient Rep.

## 2025-07-31 ENCOUNTER — MEDICAL CORRESPONDENCE (OUTPATIENT)
Dept: HEALTH INFORMATION MANAGEMENT | Facility: CLINIC | Age: 65
End: 2025-07-31
Payer: COMMERCIAL

## 2025-07-31 NOTE — TELEPHONE ENCOUNTER
*dermatofibroma--scar tissue that forms under skin      BIOPSY WOUND CARE    A biopsy is where a small piece of skin tissue is removed and examined by a pathologist.  When a biopsy is done, there is a small wound site that requires proper care to prevent infection and scarring. Some biopsies require sutures and their removal.    How to Care for Biopsy Wound    A.  Leave band-aid or dressing on for 24 hours. B. Wash two times a day with soap and water. C.  Let the wound air dry, then apply Vaseline ointment and cover with a Band-Aid       unless otherwise instructed by your provider. D. If there is slight discomfort, you may give acetaminophen or ibuprofen. When To Call the Doctor    Call the [facility] or your doctor if any of the following occur:    A. Redness and swelling  B. Tenderness and warm to touch  C.  Drainage from wound  D. Fever    Biopsy Results    Biopsy results are usually available in 1-2 weeks. We provide biopsy results in letters for begin results or we will call for any concerning results. If you have not heard from our staff please call the office within 2 weeks. Please call our office with any concerns at [phone]. Congenital Moles    Congenital (present at birth) moles are birthmarks that occur in about 1% of babies. These types of moles can range in color from tan to dark brown and can sometimes grow extra hair. The moles may darken or lighten over time. They can range in size from very small to quite large (greater than 8 inches). As your child grows, the moles will also grow larger at about the same rate. Depending on the size of the mole, there may be a slight increased risk for developing melanoma, a dangerous type of skin cancer. Since there is a small risk of these congenital moles becoming melanoma, we may recommend removal in the future. However, most congenital moles are not worrisome.     It is important to check congenital moles on a regular basis at [facility]    home. When you check your childs moles you will need to look for any changes. Things to look for include:     Changes in the border or shape of the mole   Localized color changes within the mole    Changes in the surface of the mole (new lumps or bumps)   Rapid growth of the mole    Moles    Moles, or nevi, are very common. Moles are areas of the skin where there are more cells called melanocytes. Melanocytes are the cells in the body that produce pigment, or color. Moles can be many colors including skin-tone, pink, tan, brown, and very dark brown to black. Moles can be raised or flat. Moles can have hair. Moles can grow on any skin surface, including the scalp, hands and feet. When someone is born with a mole, or develops one in the first months of life, the mole is called a congenital, or birthmark mole. About 1 in 100 people are born with one or more moles. Most people develop their moles later in childhood or adulthood. These are called acquired moles. They are most common on sun exposed areas of skin such as the face, neck, upper body, arms and legs. CHECKING MOLES  Most moles are harmless, but in rare cases moles may become cancerous. Checking moles and looking for changes is an important step in helping to catch worrisome changes early. Some changes to look for are asymmetry (moles that do not look the same on each half), irregular shapes or borders, uneven color or large size. Also look for any moles that bleed, itch, or become painful. Looking at your childs skin regularly can help you recognize moles that are more at risk for becoming cancerous. WHEN TO CALL THE DOCTOR  Call your doctor if you see any of the following changes in a mole:       Irregular borders (uneven shape or edges)       Changes in color to black, blue or red.        Changes in the surface texture       Scabs, scaling, irritation or bleeding in the mole    TREATMENT FOR MOLES  Often we can simply look at your moles and tell you if they look worrisome. If we are not concerned about the look of your moles at your appointment, we may measure some moles and take some photos that will allow us to watch for future changes in the moles. TREATMENT FOR MOLES  If a mole is getting irritated frequently, bleeding, difficult to watch due to location or dark color, atypical in appearance, or worrisome, we may perform a skin biopsy. A skin biopsy is a procedure that involves removing the mole so that it can be looked at under a microscope. There are many methods used to remove moles. The method we choose depends on the age of your child, the location of the mole, the size of the mole, and the amount of concern for skin cancer. Generally, removing moles in the dermatologists office is a simple and safe procedure that can be done with local anesthesia. PREVENTION  You can do some things to prevent moles from becoming cancerous:       Try to avoid long periods of time in the sun and severe sunburns. The sun is        especially dangerous between 10:00 am and 4:00 pm.       Use a broad spectrum, water-resistant sun block lotion with an SPF of 30 or        greater. A broad spectrum lotion blocks both UVA and UVB rays from the sun. Re-apply sunscreen at least every 2 hours and after swimming or sweating.      Take advantage of shade whenever possible. Have your child wear a        broad-brimmed hat, sunglasses, and protective clothing when outdoors.      Do not let your child use tanning beds.

## 2025-08-07 DIAGNOSIS — Z86.73 HISTORY OF TIA (TRANSIENT ISCHEMIC ATTACK): ICD-10-CM

## 2025-08-07 RX ORDER — ATORVASTATIN CALCIUM 40 MG/1
40 TABLET, FILM COATED ORAL EVERY MORNING
Qty: 90 TABLET | Refills: 0 | Status: SHIPPED | OUTPATIENT
Start: 2025-08-07

## 2025-08-27 DIAGNOSIS — Z86.73 HISTORY OF TIA (TRANSIENT ISCHEMIC ATTACK): ICD-10-CM

## 2025-08-27 RX ORDER — SENNOSIDES 8.6 MG
325 CAPSULE ORAL DAILY
Qty: 90 TABLET | Refills: 0 | Status: SHIPPED | OUTPATIENT
Start: 2025-08-27

## (undated) RX ORDER — NICARDIPINE HYDROCHLORIDE 2.5 MG/ML
INJECTION INTRAVENOUS
Status: DISPENSED
Start: 2021-09-10

## (undated) RX ORDER — GLYCOPYRROLATE 0.2 MG/ML
INJECTION, SOLUTION INTRAMUSCULAR; INTRAVENOUS
Status: DISPENSED
Start: 2021-09-10

## (undated) RX ORDER — HEPARIN SODIUM 200 [USP'U]/100ML
INJECTION, SOLUTION INTRAVENOUS
Status: DISPENSED
Start: 2021-09-10

## (undated) RX ORDER — SODIUM CHLORIDE 9 MG/ML
INJECTION, SOLUTION INTRAVENOUS
Status: DISPENSED
Start: 2021-09-10

## (undated) RX ORDER — HEPARIN SODIUM 1000 [USP'U]/ML
INJECTION, SOLUTION INTRAVENOUS; SUBCUTANEOUS
Status: DISPENSED
Start: 2021-09-10

## (undated) RX ORDER — FENTANYL CITRATE 50 UG/ML
INJECTION, SOLUTION INTRAMUSCULAR; INTRAVENOUS
Status: DISPENSED
Start: 2021-09-10